# Patient Record
Sex: FEMALE | Race: WHITE | NOT HISPANIC OR LATINO | Employment: FULL TIME | URBAN - METROPOLITAN AREA
[De-identification: names, ages, dates, MRNs, and addresses within clinical notes are randomized per-mention and may not be internally consistent; named-entity substitution may affect disease eponyms.]

---

## 2024-08-20 NOTE — PATIENT INSTRUCTIONS
"\"We hope you had a positive experience and that you can definitely recommend [facility] to your family and friends. You ll be receiving a survey soon and we look forward to hearing your feedback\".    Welcome to [facility]   and thank you for choosing us for your maternity care provider!  Congratulations!      Bambuserhart  After each of your visits you are welcome to check Olapic for your visit summary including education and links to information relevant to your pregnancy and/or well woman care.   Find the \"Visits\" tab at the top of the page, you will see a list of recent visits and for each visit a for link for \"View After Visit Summary.\" View of your After Visit Summary will allow you to read our recommendations from your visit, review any education provided, and link to websites with useful information.   If you have any questions or difficulty navigating [employer], please feel free to contact us and we will do our best to direct you.  Meet the Midwives from [facility]  You are invited to an informational meet and greet with [facility] Certified Nurse-Midwives. Our free \"Meet the Midwives\" event is a great opportunity to learn about our midwives' philosophy and experience, the hospitals where we can assist with your birth, and answer questions you may have. Partners, friends, and family are welcome to attend. Currently, this is a virtual event.  Date  Last Thursday of every month at 7 pm.    Link to next (live) meeting   [email or web address]  To Join by Telephone (audio only) Call:   [phone] Phone Conference ID: [phone] #    Contact information:  Appointment line and to get a hold of CNM in clinic Monday-Friday 8 am - 5 pm:  [phone].  There are some clinics with early start times (1st appointment 7:40 am) and others with evening hours (last appointment 6:20 pm).  Most are typically open from 8 " am to 5 pm.    CNM on call answering service: [phone].  Specify your hospital of choice and leave a brief message for CNM;  will then page CNM who is on call at your specified hospital and you should receive a call back with 15 minutes.  Be sure that your ringer is audible and that you can accept blocked calls so that we can get back in touch with you! This number should be reserved for urgent needs if during the day, before 8 am, after 5 pm, weekends, holidays.      We support all families in their infant feeding journey. We'll provide education on Breastfeeding/Chestfeeding early and often to help you achieve your goals. Please let us know if you have questions along the way!      Breastfeeding: a Healthy Option for You and Your Baby  Consider breastfeeding for the healthiest way to feed your baby. Ask your midwife or physician for more information.     The choice of how you will feed your baby is important.  Before your baby s birth, you ll want to learn about the benefits of breastfeeding.  [facility] ([place] [facility]) continue to support Baby Friendly standards; an initiative that was created by the World Health Organization and UNICEF.  This helps give you and your baby the best start in feeding their baby.    Why should I breastfeed my baby?   Babies are less likely to develop childhood obesity or diabetes   Babies are less likely to suffer from recurrent ear infections   Babies are less likely to be hospitalized for respiratory conditions   Breast milk is rich in nutrients and antibodies-it is easy to digest    How does it benefit me?   Lowers the risk for diabetes, breast and ovarian cancer and postpartum depression   Moms can lose  baby weight  more quickly   Cost savings - formula can cost well over $1,500 per year   Convenient - no bottles and nipples to sterilize, no measuring and mixing formula   The physical contact with  breastfeeding can make babies feel secure, warm and comforted     What about formula?  While you and your baby are staying with us at [facility], we will support whatever feeding choice you make for your baby.    Some important considerations:    The American Academy of Pediatrics, the World Health Organization, and many more organizations recommend exclusive breastfeeding for 6 months and continued breastfeeding while adding other foods for the first 1-2 years.    Any amount of breastmilk has benefits to both baby and mother.  Giving formula in replacement of breastfeeding can affect mother s milk supply.  If formula is needed, hospital staff will work with you on a plan to help develop your milk supply.  Formula alters the natural growth of good bacteria in the  stomach.   Research has found that first time mothers who offer formula in the hospital have a shorter duration of breastfeeding.    How can I start to prepare?   Start by having a conversation with your medical provider.   Talk with your partner, family and friends.   Attend a prenatal class that includes breastfeeding preparation. Birth and breastfeeding classes are offered by [employer]. Visit TouchBistro for class information.   After your baby s birth, hospital staff and lactation consultants will help you and your baby get off to a great start with breastfeeding.      RESOURCES  [facility] Maternity Care:   https://[facility].org/locations/the-birthplace-at[facility] M[facility] Care:   https://[facility].org/locations/[facility]        Breastfeeding:    OUTPATIENT LACTATION RESOURCES     -Schedule an appointment with a [facility] KARIE who is also a Lactation Consultant by calling [phone]. We see women for breastfeeding visits at [facility] [place].      Chocolate Milk Club:  http://www.StepsssvesselsmidwiferysCabeoices.com/chocolate-milk-club/  Join the Facebook group or join us for support on the first Monday of each month from 7 to 9 p.m.  , Dr. Diann Mo, DNP, CNM, CNP, IBCLC, ICEA  Phone: [phone]  Fax: [phone]  Email: [email or web address]    R.O.S.E. = Reaching our Sisters Everywhere  Http://www.breastfeedingrose.org/    Black Women Do Breastfeed Blog  www.blackwomendobreastfeed.org    Club Mom breastfeeding support for Black mothers:  Contact Volodymyr Kelly  Phone: [phone]   Email:  [email or web address].     Marce Mcnamara  Phone: [phone]   Email:  [email or web address].    Club Dad parent support for Black fathers:   Contact Nathan Cornejo   Phone: [phone]   Email:  [email or web address].    The ong Breastfeeding Coalition is a wonderful support for [facility] women who are breastfeeding.  They are best found on Facebook.      The Hmong Breastfeeding Coalition has produced a collection of video stories about breastfeeding in the ong community, produced by the Hmong Breastfeeding Coalition.  Most are in English, but one on handling breastmilk is in Hmong.  The video collection is in the middle of the page.    This page also has several other resources on ong breastfeeding.     https://mnbreastfeedingcoalition.org/communities/    WIC program application: [place]   Https://www.youPosiGen Solar Solutionsube.com/watch?v=lQoWwPoyGYc    For information on Local WIC services call:  [phone]    You may qualify...  If you are pregnant, nursing, or have a child under age 5, we encourage you to Apply for WIC.    WIC Provides...  Nutrition tips and advice  Support for breastfeeding  Healthy foods like fresh fruits and vegetables, whole grain cereals, bread and tortillas, low fat milk, and baby foods  Caring and supportive staff  Don't delay! We're here to help!  CALL TODAY FOR A WIC  CLINIC NEAR YOU  4-892-WRT-5951 or [phone]     New Parent Connection:   [place], [address] [facility]  In-person meetings on  from 6 pm - 7:15 pm for parents of  to 9 months, at the same site.   All are free, drop-in, no registration required.    There are also free virtual meetings ongoing on :  11:30 am - 12:30 pm for parents of newborns to 3 months  4:15 pm to 5:15 pm for parents of 3 to 9-month olds  For joining info parents should call Flori Vu at [phone]    -Baby Café  Find a Baby Café - Baby Café PrepClass (babyPEARL Unlimited Holdings.Cympel)   Search by State ([place]) to find the nearest cafe to you      [facility] Baby Café  Due to COVID-19, all Baby Café sessions are canceled until further notice. For lactation support, please contact one of our bilingual staff:  Mariza (IBCLC) [phone]  Joanne (IBCLC/ Albanian) [phone]  Zojustyna (Hmong) [phone]  Albaro (Lao) [phone]  Baby Café is a free, drop-in service offering breastfeeding/chestfeeding support. Come share tips and socialize with other pregnant, breastfeeding/chestfeeding families. Babies and siblings are welcome (no  available).  We offer:  Professionally trained lactation staff.  Resource books for lending.  Relaxed and fun atmosphere.  Refreshments.   More information  Joanne Blum  [phone]  [email or web address].     -Attend a baby weigh in at [facility].  Lactation consultants are available to answer questions  [place]:  1:00 - 2:00  [facility]:  1:00 - 2:00   www.Simple AdmitFresno Heart & Surgical HospitalDuvas Technologies.Synterna Technologies    -Attend one of the New Mama groups at [facility] in [facility].  [facility] also offers one-on-one in home and in office lactation consults.   www.[facility].[employer] Technologies    -Attend a LeLeche League meeting.  Multiple groups in several locations throughout the [facility]. The meetings are no-cost and always informative breastfeeding education session  through [employer] La Leche Hayden  Www.enrrique.org/     Held at [facility] the second Thursday of each month at 7pm    Childbirth and Parenting Education:     Everyday Miracles:   https://www.everyday-miracles.org/    Free Video Series from [facility]: https://nursing.[facility]/academics/specialty-areas/nurse-midwifery/having-baby-prenatal-videos/having-baby-prenatal-and    Childbirth Education virtual (live) classes: www.Work Market/classes  The Birth Hour: https://Funinhand/online-childbirth-class/  BirthED: https://www.birthedmn.com/  GABE parenting center: http://Houseboat Resort ClubLong Island College HospitalKaliki/   (529) 284-RAGO  [place]: (education, yoga & wellness) www.AdScore  Enlightened Mama: www.enlightenedmamaWheely   Childbirth collective: (Parent topic nights)  www.childbirthcollective.org/  Hypnobabies:  www.hypnobabiestFotolia.XanEdu/  Hypnobirthing:  Http://Floobits.XanEdu/  Hypnobirthing virtual class: www.SwapBeats/hypnobirthing    Information about doulas:  Childbirth collective: http://www.childbirthcollective.org/  Doulas of [place] (BAILEY):  www.bailey.org  [facility]  project: http://twincitiesdoulaproject.com/     Early Childhood and Family Education (ECFE):  ECFE offers parents hands-on learning experiences that will nourish a lifetime of teachable moments.  http://ecfe.info/ecfe-home/    APPS and Podcasts:   Inés Cormier Nurture    Evidence Based Birth  The Birth Hour (for birth stories)   Birthful   Expectful   The Longest Shortest Time  PregnancyPodcast Marianna Cruz  https://www.downtobirthshow.com/    Book Recommendations:   Sheila Pisgah's Birthing From Within--first few chapters include a new-age tone, you may prefer to skip it and keep going, because there is good stuff later.  This book recommendation covers emotional preparation, but does cover coping with pain, and use of both pharmacological and nonpharmacological methods.    Guide to Childbirth by  "[place] May Colleen  Childbirth Without Fear by Mary Fernandez Read    Dr. Kay' The Pregnancy Book and The Birth Book--the pregnancy book goes month-by month    The Birth Partner by Rain Hernandez    Womanly Art of Breastfeeding by La Leche League International   Bestfeeding by Halina Patel--great pictures    Mothering Your Nursing Toddler, by Gemma Noel.   Addresses dealing with so many of the challenging behaviors of a nursing toddler.  How Weaning Happens, by La Leche League.  Discusses weaning at all ages, from medically necessary weaning of an infant, all the way up to age 5 (or older), with why/why not, and strategies.  Very empowering book both for deciding to wean and deciding not to.    American College of Nurse-Midwives (ACNM) http://www.midwife.org/; look at the informational handouts at http://www.midwife.org/Share-With-Women     www.mymidwife.org    Mother to Baby (Medication and Herbal guidance in pregnancy): http://www.mothertobaby.org  Toll-Free Hotline: 237.249.3691  LactMed (Medication use while breastfeeding): http://toxnet.nlm.nih.gov/newtoxnet/lactmed.htm    Women's Health.gov:  http://www.womenshealth.gov/a-z-topics/index.html    American pregnancy association - http://americanpregnancy.org    Centering Pregnancy (group prenatal care option): http://centeringhealthcare.org    March of Dimes www.Home Comfort Zones.com     FDA - Nutrition  www.mypyramid.gov  Under \"For Consumers,\" click on \"pregnant and breastfeeding women.\"      Centers for Disease Control and Prevention (CDC) - Vaccines : http://www.cdc.gov/vaccines/       When researching information on the web, question the validity of websites.  The domains .gov, .edu and.org tend to be more reliable information.  If there are a lot of advertisements, be cautious of the information provided. Stay away from blogs and chat rooms please!     "

## 2024-08-20 NOTE — PROGRESS NOTES
PRENATAL VISIT   FIRST OBSTETRICAL EXAM - OB     Assessment / Impression   First prenatal visit at Unknown  29 year old    Bipolar disorder  First Pap smear done today  Pre-pregnant BMI 30  Secondhand smoke exposure    Plan:   -Dating ultrasound ordered and accepted.  -Desires to see a psychiatry provider familiar with pregnancy.  Accepts referral to  psychiatry, referred to [facility] and [facility]i  program.  Referral for medication management as well as therapy.  -IOB labs drawn including HgbA1C.   -Pt is not a candidate for drawing lead level per [facility] screening tool.   -Waterbirth education shared in IOB packet.   -Reviewed prenatal care schedule.   -Optimal nutrition and weight gain discussed. Pregnancy weight gain of 11-20 lbs (BMI 30.0-34.9) encouraged.   -Anticipatory guidance for common pregnancy questions and concerns reviewed.   -Danger s/sx for this trimester reviewed with patient.   -Reviewed genetic screening options with patient.  She will review the information on nips and call insurance if desired.  Aware she can have done at next visit or sooner as desired.  -IOB packet given and reviewed with patient.   -CNM services and hospital options reviewed; emergency and scheduling phone numbers given to patient.   -Because the patient does have 1 high risk factor, low-dose aspirin will be initiated at 12 weeks.  -Antepartum VTE risk factors absent.  -Pt is not a candidate for an antepartum OB consult.    -Return to clinic in 4 weeks or sooner as needed.    Total time: 65 minutes spent on the date of the encounter doing chart review, review of test results, patient visit and documentation.     Subjective:   Manuela Gruber is a 29 year old  here today for her first obstetrical exam at Unknown. Here with Junito . This pregnancy is not planned.  She had her Nexplanon out in April.  Her cycles were irregular and they were considering a pregnancy in the future but were not actively trying.   They are excited and accepting of the pregnancy. The patient reports nausea, vomiting, fatigue, and breast tenderness.  She is unsure of her last menstrual period.  She was getting cycles approximately every 2 to 4 weeks.    The patient states that she is in a monogamous relationship and states that she is safe. Offered GC/CT screening today and patient declines. Additional pregnancy symptoms include: breast tenderness, fatigue, frequent urination, and nausea.     Risk factors: pre-pregnancy obesity. Pregnancy Risk Factors:Significantly overweight or underweight and Every been treated for an emotional disturbance    The patient has the following high risk factors for preeclampsia: none.   The patient has the following moderate risk factors for preeclampsia: first pregnancy    The patient has the following antepartum risk factors for VTE (two or more risk factors, or 1 * risk factor, places patient at higher risk): none.    Clinical history/risk factors requiring antepartum OB consult: none.    Social History:   Education level: Some college  Occupation:  with [employer], works from home  Partners name: Junito  ?   OB History    Para Term  AB Living   1 0 0 0 0 0   SAB IAB Ectopic Multiple Live Births   0 0 0 0 0      # Outcome Date GA Lbr Brody/2nd Weight Sex Type Anes PTL Lv   1 Current                 History:   Past Medical History:   Diagnosis Date    Bipolar disorder (H)       No past surgical history on file.   Family History   Problem Relation Age of Onset    Mental Illness Mother     No Known Problems Father     Mental Illness Maternal Grandmother     No Known Problems Maternal Grandfather     No Known Problems Paternal Grandmother     No Known Problems Paternal Grandfather     Heart Defect Paternal Uncle     Mental Illness Maternal Aunt     No Known Problems Maternal Uncle       Social History     Tobacco Use    Smoking status: Never     Passive exposure: Current    Smokeless tobacco:  Never   Substance Use Topics    Alcohol use: Not Currently    Drug use: Never      Current Outpatient Medications   Medication Sig Dispense Refill    ARIPiprazole (ABILIFY) 5 MG tablet       Prenat MV-Min w/Fe-Folate-DHA (PRENATAL COMPLETE PO) Take by mouth.      busPIRone (BUSPAR) 5 MG tablet  (Patient not taking: Reported on 8/26/2024)        No Known Allergies     The patient's medical, surgical and family histories were reviewed and were pertinent to this visit.  Was diagnosed with bipolar a few years ago. Sees psychiatrist at [facility], Dr. Rocha.  She states he told her medications were not safe to take in pregnancy and it may be best to go off all medications.  She stopped the BuSpar which she took to help manage anxiety but continues on the Abilify.  She denies any safety concerns, suicidal thoughts or plans.  She desires to see a pregnancy specific psychiatrist.    Pap smear: Last Pap: Never. Next Due: Today.     Phq2 (   1999 Pfizer Inc,All Rights Reserved. Used With Permission. Developed By Candy Craft,Nick Butterfield And Colleagues,With An Educational Shasta From Pfizer Inc.)    8/26/2024 10:58 AM CDT - Filed by Patient   The following questionnaire should only be answered by the patient. Are you the patient? Yes   Over the last 2 weeks, how often have you been bothered by any of the following problems?    Q1: Little interest or pleasure in doing things Not at all   Q2: Feeling down, depressed or hopeless Not at all   PHQ2 (   1999 PFIZER INC,ALL RIGHTS RESERVED. USED WITH PERMISSION. DEVELOPED BY CANDY CRAFT,NICK BUTTERFIELD AND COLLEAGUES,WITH AN EDUCATIONAL SHASTA FROM [employer]arm[employer]here.)    PHQ-2 Score (range: 0 - 6) 0     [place] Pp Depression Scale    8/26/2024 10:59 AM CDT - Filed by Patient   I have been able to laugh and see the funny side of things. As much as I always could   I have looked forward with enjoyment to things. As much as I ever  did   I have blamed myself unnecessarily when things went wrong. Yes, most of the time   I have been anxious or worried for no good reason. Yes, sometimes   I have felt scared or panicky for no good reason. No, not at all   Things have been getting on top of me. Yes, sometimes I haven't been coping as well as usual   I have been so unhappy that I have had difficulty sleeping. Not very often   I have felt sad or miserable. Not very often   I have been so unhappy that I have been crying. No, never   The thought of harming myself has occurred to me. Never     Myc Communicable Disease Screening    8/21/2024  4:27 PM CDT - Filed by Patient   Do you have any of the following new or worsening symptoms ? None of these   Have you had close contact with someone who has traveled outside the country in the past 30 days and is sick? No     Prenatal Ob Questionnaire    8/21/2024  4:28 PM CDT - Filed by Patient   Past Medical History 2    Have you ever received a blood transfusion? Unknown   Would you accept a blood transfusion if was medically recommended? Yes    Is your blood type Rh negative? Unknown   Have you been hospitalized for a nonsurgical reason excluding normal delivery? Unknown   Do you have a history of abnormalities of the uterus? Unknown   Have you ever had an abnormal pap smear? No   Did your mother take RAYNE or any other hormones when she was pregnant with you? Unknown   Have you ever ? No   Do you live with someone who has tuberculosis? No   Have you ever been exposed to tuberculosis? No   Have you had any other infectious diseases? No   Have you had a viral illness since your last period? Unknown   Does anyone in your home smoke?  Yes !    Have you ever recieved care for your mental health?   Yes !    Have you ever been in a major accident or suffered serious trauma? No   Within the last year, has anyone hit, slapped, kicked or otherwise hurt you? No   In the last year, has anyone forced you to have sex  "when you didn't want to? No   Do you have any other problems we have not asked about which you feel may be important to this pregnancy? No     [facility] Reg Add'L Questions    8/21/2024  4:35 PM CDT - Filed by Patient         EPDS score today: 9.\"0\" to #10     Review of Systems   General: Fatigue but otherwise denies problem   Eyes: Denies problem   Ears/Nose/Throat: Denies problem   Cardiovascular: Denies problem   Respiratory: Denies problem   Gastrointestinal: Nausea without vomiting, otherwise negative   Genitourinary: Denies any discharge, vaginal bleeding or itchiness or any other problem   Musculoskeletal: Breast tenderness otherwise denies problem   Skin: Denies problem   Neurologic: Denies problem   Psychiatric: Denies problem   Endocrine: Denies problem   Heme/Lymphatic: Denies problem   Allergic/Immunologic: Denies problem           Objective:   Objective    Vitals:    08/26/24 1115   BP: 104/68   Pulse: 78   SpO2: 96%   Weight: 64 kg (141 lb)   Height: 1.473 m (4' 10\")        Physical Exam:   General Appearance: Alert, cooperative, no distress, appears stated age   HEENT: Normocephalic, without obvious abnormality, atraumatic. Conjunctiva/corneas clear.  Neck: Supple, symmetrical, trachea midline, no adenopathy.   Thyroid: not enlarged, symmetric, no tenderness/mass/nodules   Back: Symmetric, no curvature, ROM normal, no CVA tenderness   Lungs: Clear to auscultation bilaterally, respirations unlabored   Heart: Regular rate and rhythm, S1 and S2 normal, no murmur, rub, or gallop. No edema to lower extremities.   Breasts: No breast masses, tenderness, asymmetry, or nipple discharge. Nipples are everted.   Abdomen: Gravid, soft, non-tender, bowel sounds active all four quadrants, no masses.   FHT: 150  Vulva:  no sign of lesions or condyloma, normal hair distribution   Vagina: pink, normal rugae, no abnormal discharge   Cervix:  long/thick/closed, no lesions or inflammation " noted  Musculoskeletal: Extremities normal, atraumatic, no cyanosis   Skin: Skin color, texture, turgor normal, no rashes or lesions   Lymph nodes: Cervical, supraclavicular, and axillary nodes normal   Neurologic: Alert and oriented x 3. Normal speech

## 2024-08-22 ENCOUNTER — NURSE TRIAGE (OUTPATIENT)
Dept: NURSING | Facility: CLINIC | Age: 29
End: 2024-08-22
Payer: COMMERCIAL

## 2024-08-23 NOTE — TELEPHONE ENCOUNTER
"Pt reports oral temperature \"just now\" 99.9. Pt reports since yesterday \"sniffles, sneezing, feeling under weather today, runny nose, muscle aches, clogged stuffy feeling sinus'\". Pt reports she has been having morning sickness, nausea \"all day thing\". Pt reports morning sickness managable today, \"haven't thrown up\", drank some water, had soup. Pt reports she does think her fluid intake is less due to morning sickness. Pt reports she is unable to take Tylenol because it has caused her stomach upset in the past.     Writer advised pt on home care per Care Advice especially sinus rinses and increased fluids. Advised pt to call back if fever 100.4 occurs, especially if worsening symptoms. Call back if home care not helpful.     Pt verbalizes understanding and agrees to plan.       Reason for Disposition   [1] Sinus congestion as part of a cold AND [2] present < 10 days    Additional Information   Negative: SEVERE difficulty breathing (e.g., struggling for each breath, speaks in single words)   Negative: Sounds like a life-threatening emergency to the triager   Negative: [1] Sinus infection AND [2] taking an antibiotic AND [3] symptoms continue   Negative: [1] Difficulty breathing AND [2] not from stuffy nose (e.g., not relieved by cleaning out the nose)   Negative: [1] SEVERE headache AND [2] fever   Negative: [1] Redness or swelling on the cheek, forehead or around the eye AND [2] fever   Negative: Fever > 104 F (40 C)   Negative: Patient sounds very sick or weak to the triager   Negative: [1] SEVERE pain AND [2] not improved 2 hours after pain medicine   Negative: [1] Redness or swelling on the cheek, forehead or around the eye AND [2] no fever   Negative: [1] Fever > 101 F (38.3 C) AND [2] age > 60 years   Negative: [1] Fever > 100.0 F (37.8 C) AND [2] bedridden (e.g., CVA, chronic illness, recovering from surgery)   Negative: [1] Fever > 100.0 F (37.8 C) AND [2] diabetes mellitus or weak immune system (e.g., HIV " positive, cancer chemo, splenectomy, organ transplant, chronic steroids)   Negative: Fever present > 3 days (72 hours)   Negative: [1] Fever returns after gone for over 24 hours AND [2] symptoms worse or not improved   Negative: [1] Sinus pain (not just congestion) AND [2] fever   Negative: Earache   Negative: [1] Sinus congestion (pressure, fullness) AND [2] present > 10 days   Negative: [1] Nasal discharge AND [2] present > 10 days   Negative: [1] Using nasal washes and pain medicine > 24 hours AND [2] sinus pain (around cheekbone or eye) persists   Negative: Lots of coughing    Protocols used: Sinus Pain or Congestion-A-AH

## 2024-08-26 ENCOUNTER — MYC MEDICAL ADVICE (OUTPATIENT)
Dept: MIDWIFE SERVICES | Facility: CLINIC | Age: 29
End: 2024-08-26

## 2024-08-26 ENCOUNTER — HOSPITAL ENCOUNTER (OUTPATIENT)
Dept: ULTRASOUND IMAGING | Facility: HOSPITAL | Age: 29
Discharge: HOME | End: 2024-08-26
Attending: ADVANCED PRACTICE MIDWIFE | Admitting: ADVANCED PRACTICE MIDWIFE
Payer: COMMERCIAL

## 2024-08-26 ENCOUNTER — PRENATAL OFFICE VISIT (OUTPATIENT)
Dept: MIDWIFE SERVICES | Facility: CLINIC | Age: 29
End: 2024-08-26
Payer: COMMERCIAL

## 2024-08-26 VITALS
HEIGHT: 58 IN | DIASTOLIC BLOOD PRESSURE: 68 MMHG | SYSTOLIC BLOOD PRESSURE: 104 MMHG | WEIGHT: 141 LBS | BODY MASS INDEX: 29.6 KG/M2 | OXYGEN SATURATION: 96 % | HEART RATE: 78 BPM

## 2024-08-26 DIAGNOSIS — Z34.00 SUPERVISION OF NORMAL FIRST PREGNANCY, ANTEPARTUM: Primary | ICD-10-CM

## 2024-08-26 DIAGNOSIS — F31.63 BIPOLAR DISORDER, CURRENT EPISODE MIXED, SEVERE, UNSPECIFIED WHETHER PSYCHOTIC FEATURES (H): ICD-10-CM

## 2024-08-26 DIAGNOSIS — Z34.00 SUPERVISION OF NORMAL FIRST PREGNANCY, ANTEPARTUM: ICD-10-CM

## 2024-08-26 DIAGNOSIS — F31.70 BIPOLAR DISORDER IN PARTIAL REMISSION, MOST RECENT EPISODE UNSPECIFIED TYPE (H): ICD-10-CM

## 2024-08-26 PROBLEM — F31.9 BIPOLAR AFFECTIVE DISORDER, CURRENTLY ACTIVE (H): Status: ACTIVE | Noted: 2024-08-26

## 2024-08-26 LAB
ABO/RH(D): NORMAL
ANTIBODY SCREEN: NEGATIVE
ERYTHROCYTE [DISTWIDTH] IN BLOOD BY AUTOMATED COUNT: 12.4 % (ref 10–15)
HBV SURFACE AG SERPL QL IA: NONREACTIVE
HCT VFR BLD AUTO: 34.4 % (ref 35–47)
HCV AB SERPL QL IA: NONREACTIVE
HGB BLD-MCNC: 12.1 G/DL (ref 11.7–15.7)
HIV 1+2 AB+HIV1 P24 AG SERPL QL IA: NONREACTIVE
MCH RBC QN AUTO: 31.6 PG (ref 26.5–33)
MCHC RBC AUTO-ENTMCNC: 35.2 G/DL (ref 31.5–36.5)
MCV RBC AUTO: 90 FL (ref 78–100)
PLATELET # BLD AUTO: 183 10E3/UL (ref 150–450)
RBC # BLD AUTO: 3.83 10E6/UL (ref 3.8–5.2)
RUBV IGG SERPL QL IA: 2.43 INDEX
RUBV IGG SERPL QL IA: POSITIVE
SPECIMEN EXPIRATION DATE: NORMAL
SPECIMEN EXPIRATION DATE: NORMAL
T PALLIDUM AB SER QL: NONREACTIVE
WBC # BLD AUTO: 7.5 10E3/UL (ref 4–11)

## 2024-08-26 PROCEDURE — 87389 HIV-1 AG W/HIV-1&-2 AB AG IA: CPT | Performed by: ADVANCED PRACTICE MIDWIFE

## 2024-08-26 PROCEDURE — G0145 SCR C/V CYTO,THINLAYER,RESCR: HCPCS | Performed by: ADVANCED PRACTICE MIDWIFE

## 2024-08-26 PROCEDURE — 76801 OB US < 14 WKS SINGLE FETUS: CPT

## 2024-08-26 PROCEDURE — 86803 HEPATITIS C AB TEST: CPT | Performed by: ADVANCED PRACTICE MIDWIFE

## 2024-08-26 PROCEDURE — 86762 RUBELLA ANTIBODY: CPT | Performed by: ADVANCED PRACTICE MIDWIFE

## 2024-08-26 PROCEDURE — 86900 BLOOD TYPING SEROLOGIC ABO: CPT | Performed by: ADVANCED PRACTICE MIDWIFE

## 2024-08-26 PROCEDURE — 87340 HEPATITIS B SURFACE AG IA: CPT | Performed by: ADVANCED PRACTICE MIDWIFE

## 2024-08-26 PROCEDURE — 85027 COMPLETE CBC AUTOMATED: CPT | Performed by: ADVANCED PRACTICE MIDWIFE

## 2024-08-26 PROCEDURE — 36415 COLL VENOUS BLD VENIPUNCTURE: CPT | Performed by: ADVANCED PRACTICE MIDWIFE

## 2024-08-26 PROCEDURE — 86850 RBC ANTIBODY SCREEN: CPT | Performed by: ADVANCED PRACTICE MIDWIFE

## 2024-08-26 PROCEDURE — 87086 URINE CULTURE/COLONY COUNT: CPT | Performed by: ADVANCED PRACTICE MIDWIFE

## 2024-08-26 PROCEDURE — 99205 OFFICE O/P NEW HI 60 MIN: CPT | Performed by: ADVANCED PRACTICE MIDWIFE

## 2024-08-26 PROCEDURE — 99459 PELVIC EXAMINATION: CPT | Performed by: ADVANCED PRACTICE MIDWIFE

## 2024-08-26 PROCEDURE — 86780 TREPONEMA PALLIDUM: CPT | Performed by: ADVANCED PRACTICE MIDWIFE

## 2024-08-26 PROCEDURE — 86901 BLOOD TYPING SEROLOGIC RH(D): CPT | Performed by: ADVANCED PRACTICE MIDWIFE

## 2024-08-26 RX ORDER — ARIPIPRAZOLE 5 MG/1
TABLET ORAL
COMMUNITY

## 2024-08-26 RX ORDER — BUSPIRONE HYDROCHLORIDE 5 MG/1
TABLET ORAL
COMMUNITY

## 2024-08-26 ASSESSMENT — EDINBURGH POSTNATAL DEPRESSION SCALE (EPDS)
I HAVE BLAMED MYSELF UNNECESSARILY WHEN THINGS WENT WRONG: YES, MOST OF THE TIME
THINGS HAVE BEEN GETTING ON TOP OF ME: YES, SOMETIMES I HAVEN'T BEEN COPING AS WELL AS USUAL
I HAVE BEEN ABLE TO LAUGH AND SEE THE FUNNY SIDE OF THINGS: AS MUCH AS I ALWAYS COULD
I HAVE BEEN ABLE TO LAUGH AND SEE THE FUNNY SIDE OF THINGS: AS MUCH AS I ALWAYS COULD
I HAVE FELT SCARED OR PANICKY FOR NO GOOD REASON: NO, NOT AT ALL
I HAVE FELT SAD OR MISERABLE: NOT VERY OFTEN
I HAVE FELT SCARED OR PANICKY FOR NO GOOD REASON: NO, NOT AT ALL
THINGS HAVE BEEN GETTING ON TOP OF ME: YES, SOMETIMES I HAVEN'T BEEN COPING AS WELL AS USUAL
I HAVE BEEN SO UNHAPPY THAT I HAVE HAD DIFFICULTY SLEEPING: NOT VERY OFTEN
I HAVE FELT SAD OR MISERABLE: NOT VERY OFTEN
I HAVE BEEN ANXIOUS OR WORRIED FOR NO GOOD REASON: YES, SOMETIMES
THE THOUGHT OF HARMING MYSELF HAS OCCURRED TO ME: NEVER
I HAVE BEEN SO UNHAPPY THAT I HAVE BEEN CRYING: NO, NEVER
THE THOUGHT OF HARMING MYSELF HAS OCCURRED TO ME: NEVER
I HAVE BEEN SO UNHAPPY THAT I HAVE HAD DIFFICULTY SLEEPING: NOT VERY OFTEN
TOTAL SCORE: 9
I HAVE LOOKED FORWARD WITH ENJOYMENT TO THINGS: AS MUCH AS I EVER DID
I HAVE LOOKED FORWARD WITH ENJOYMENT TO THINGS: AS MUCH AS I EVER DID
I HAVE BLAMED MYSELF UNNECESSARILY WHEN THINGS WENT WRONG: YES, MOST OF THE TIME
I HAVE BEEN SO UNHAPPY THAT I HAVE BEEN CRYING: NO, NEVER
I HAVE BEEN ANXIOUS OR WORRIED FOR NO GOOD REASON: YES, SOMETIMES

## 2024-08-27 LAB — BACTERIA UR CULT: NORMAL

## 2024-08-29 LAB
BKR LAB AP GYN ADEQUACY: NORMAL
BKR LAB AP GYN INTERPRETATION: NORMAL
BKR LAB AP HPV REFLEX: NORMAL
BKR LAB AP PREVIOUS ABNORMAL: NORMAL
PATH REPORT.COMMENTS IMP SPEC: NORMAL
PATH REPORT.COMMENTS IMP SPEC: NORMAL
PATH REPORT.RELEVANT HX SPEC: NORMAL

## 2024-09-28 NOTE — PROGRESS NOTES
Manuela is here alone for a routine prenatal visit at 16w3d. Reviewed IOB labs and dating ultrasound.   Disc option for genetic screening at this time including NIPS, quad screen, single marker AFP ([id number]). Pt ***.   Pregnancy discomforts include ***.   Sleeping ***.   Fetal movements ***.   Anatomy scan discussed and ordered at ***.   Early second trimester pregnancy anticipatory guidance reviewed.   Enc follow-up in 4 weeks or sooner prn.

## 2024-09-30 ENCOUNTER — PRENATAL OFFICE VISIT (OUTPATIENT)
Dept: MIDWIFE SERVICES | Facility: CLINIC | Age: 29
End: 2024-09-30
Payer: COMMERCIAL

## 2024-09-30 VITALS
BODY MASS INDEX: 29.26 KG/M2 | WEIGHT: 140 LBS | SYSTOLIC BLOOD PRESSURE: 108 MMHG | DIASTOLIC BLOOD PRESSURE: 66 MMHG | OXYGEN SATURATION: 99 % | HEART RATE: 81 BPM

## 2024-09-30 DIAGNOSIS — Z34.00 SUPERVISION OF NORMAL FIRST PREGNANCY, ANTEPARTUM: Primary | ICD-10-CM

## 2024-09-30 DIAGNOSIS — Z91.89 AT RISK FOR COMPLICATION OF PREGNANCY: ICD-10-CM

## 2024-09-30 PROCEDURE — 99207 PR PRENATAL VISIT: CPT | Performed by: ADVANCED PRACTICE MIDWIFE

## 2024-09-30 PROCEDURE — 99000 SPECIMEN HANDLING OFFICE-LAB: CPT | Performed by: ADVANCED PRACTICE MIDWIFE

## 2024-09-30 PROCEDURE — 36415 COLL VENOUS BLD VENIPUNCTURE: CPT | Performed by: ADVANCED PRACTICE MIDWIFE

## 2024-09-30 PROCEDURE — 82105 ALPHA-FETOPROTEIN SERUM: CPT | Mod: 90 | Performed by: ADVANCED PRACTICE MIDWIFE

## 2024-09-30 RX ORDER — ASPIRIN 81 MG/1
81 TABLET ORAL DAILY
Qty: 90 TABLET | Refills: 1 | Status: SHIPPED | OUTPATIENT
Start: 2024-09-30 | End: 2025-03-29

## 2024-09-30 NOTE — PROGRESS NOTES
Manuela is here with Junito for a routine prenatal visit at 16w3d. Reviewed IOB labs and dating ultrasound. Disc option for genetic screening at this time including NIPS, quad screen, single marker AFP ([id number]). Pt accepts AFP today. Still unsure insurance coverage for NIPS so declines today. Pregnancy discomforts include some continued nausea and fatigue though both are lifting. Sleeping fairly well.  Has not yet scheduled with [facility] due to insurance issues but feels emotionally and mentally stable on her current dosage of Abilify.  Agrees to call [facility] to set up an intake visit.  No quickening yet.  Anatomy scan discussed and ordered at [facility].   Early second trimester pregnancy anticipatory guidance reviewed.   Enc follow-up in 4 weeks or sooner prn.   Flu shot recommended, patient declines today.

## 2024-09-30 NOTE — PATIENT INSTRUCTIONS
"\"We hope you had a positive experience and that you can definitely recommend [facility] to your family and friends. You ll be receiving a survey soon and we look forward to hearing your feedback\".    [facility]- Contact information:  Appointment line and to get a hold of CNM in clinic Monday-Friday 8 am - 5 pm:  [phone].  There are some clinics with early start times (1st appointment 7:40 am) and others with evening hours (last appointment 6:20 pm).  Most are typically open from 8 am to 5 pm.    CNM on call answering service: [phone].  Specify your hospital of choice and leave a brief message for CNM;  will then page CNM who is on call at your specified hospital and you should receive a call back with 15 minutes.  Be sure that your ringer is audible and that you can accept blocked calls so that we can get back in touch with you! This number should be reserved for urgent needs if during the day, before 8 am, after 5 pm, weekends, holidays.    Contact the on-call CNM with warning signs, such as:  vaginal bleeding   Vaginal discharge and itching or pain and burning during urination  Leg/calf pain or swelling on one side  severe abdominal pain  nausea and vomiting more than 4-5 times a day, or if you are unable to keep anything down  fever more than 100.4 degrees F.     Lyfepointshart  After each of your visits you are welcome to check LiveBid for your visit summary including education and links to information relevant to your pregnancy and/or well woman care.   Find the \"Visits\" tab at the top of the page, you will see a list of recent visits and for each visit a for link for \"View After Visit Summary.\" View of your After Visit Summary will allow you to read our recommendations from your visit, review any education provided, and link to websites with useful information.   If you have any questions or difficulty navigating Boomr, please feel free to " "contact us and we will do our best to direct you.        Touring the Maternity Care Center  At this time we are offering a virtual tour of the Maternity Care Centers at both [facility] and [facility]:   [facility] Maternity Care:     [email or web address].[email or web address]/locations/the-birthplace-at--[facility] Maternity Care:   [email or web address]--[facility]       Meet the Midwives from [facility]  You are invited to an informational meet and greet with [facility]'s [facility] Certified Nurse-Midwives. Our free \"Meet the Midwives\" event is a great opportunity to learn about our midwives' philosophy and experience, the hospitals where we can assist with your birth, and answer questions you may have. Partners, friends, and family are welcome to attend. Currently, this is a virtual event.  Date  Last Thursday of every month at 7 pm.    Link to next (live) meeting  [email or web address]  To Join by Telephone (audio only) Call:   [phone] Phone Conference ID: [phone] #    Ultrasound Appointment:   Don't forget to schedule your ultrasound appointment around 20 weeks into your pregnancy. Your midwife will order the exam for you to schedule at [phone] with [facility] radiology locations or at the independent radiology clinic of your preference.      Why is dental care in pregnancy important?  During pregnancy, you are more likely to have problems with your teeth or gums. If you have an infection in your teeth or gums, the chance of your baby being premature (born early) or having low birth weight may be slightly higher than if your teeth and gums are healthy  Dental care is safe during pregnancy and important for the health of you and your baby.   What should you know before you see the dentist?  Make sure your dentist knows that you are " pregnant.  If medications for infection or for pain are needed, your dentist can prescribe ones that are safe for you and your baby.  Tell your dentist about any changes you have noticed since you became pregnant and about any medications r or supplements you are taking.  Routine x-rays should be avoided in pregnancy, but it may be necessary if there is a problem or an emergency.   Your body should be covered with a lead apron to protect you and your baby.  Dental work can be done safely at any point in pregnancy. If possible, it is best to delay treatments and pro- cedures until after the first trimester.    For more information on dental health in pregnancy: http://onlinelibrary.de la vega.com/store/10.1111/jmwh.56849/asset/hbwi68657.pdf?v=1&t=ufzp5s37&o=[email or web address]     Quickening:   Your Baby in the Second Trimester of Pregnancy  At the start of the second trimester, you will feel your baby's movements, which get stronger as the baby grows bigger. At the end of the fourth month, your baby weighs about five ounces. Her kidneys begin to produce urine. During visits to your health care provider, you will be able to hear your baby's heartbeat more clearly. Your baby can move and hear your voice.   By the end of the fifth month, you'll be able to feel light movements (called quickening) of your fetus. Your baby is sleeping and waking at regular intervals, and is more active than before. At this point, she is about nine inches long and weighs about one-half to one pound. During the sixth month, your baby's features become clearer. Eyebrows, eyelashes, and hair are developing. She also has finger and toe prints, and may be kicking strongly.  By the end of the second trimester, your baby weighs as much as two pounds and is about 11 inches long.         Gestational diabetes  Gestational diabetes develops during pregnancy (gestation). Like other types of diabetes, gestational diabetes affects how  your cells use sugar (glucose). Gestational diabetes causes high blood sugar that can affect your pregnancy and your baby's health.  Any pregnancy complication is concerning, but there's good news. Expectant moms can help control gestational diabetes by eating healthy foods, exercising and, if necessary, taking medication. Controlling blood sugar can prevent a difficult birth and keep you and your baby healthy.  In gestational diabetes, blood sugar usually returns to normal soon after delivery. But if you've had gestational diabetes, you're at risk for type 2 diabetes. You'll continue working with your health care team to monitor and manage your blood sugar.    Who is at risk?  This is a list of factors that increase the risk of developing gestational diabetes for women during pregnancy:      Overweight prior to pregnancy (20% or more over ideal body weight)      High risk ethnic group: , , ,       Impaired glucose tolerance or traces of glucose in the urine      Family history of diabetes      Previously giving birth to a baby over 9 lbs. or stillborn      Previous pregnancy with gestational diabetes    Prevention:  There are no guarantees when it comes to preventing gestational diabetes -- but the more healthy habits you can adopt before pregnancy, the better. If you've had gestational diabetes, these healthy choices may also reduce your risk of having it in future pregnancies or developing type 2 diabetes down the road.  Eat healthy foods. Choose foods high in fiber and low in fat and calories. Focus on fruits, vegetables and whole grains. Strive for variety to help you achieve your goals without compromising taste or nutrition. Watch portion sizes.   Keep active. Exercising before and during pregnancy can help protect you from developing gestational diabetes. Aim for 30 minutes of moderate activity on most days of the week. Take a brisk daily walk. Ride your bike. Swim  laps.  If you can't fit a single 30-minute workout into your day, several shorter sessions can do just as much good. Park in the distant lot when you run errands. Get off the bus one stop before you reach your destination. Every step you take increases your chances of staying healthy.  Lose excess pounds before pregnancy. Doctors don't recommend weight loss during pregnancy. But if you're planning to get pregnant, losing extra weight beforehand may help you have a healthier pregnancy.  Focus on permanent changes to your eating habits. Motivate yourself by remembering the long-term benefits of losing weight, such as a healthier heart, more energy and improved self-esteem.    Preventing Diabetes after Pregnancy:  It is estimated that 35-60 percent of women that have had gestational diabetes will develop type 2 diabetes in the future. It is also thought that children from these pregnancies have a greater chance of developing obesity and type 2 diabetes.    If you do have prediabetes or have risk factors for having diabetes, research shows that doing just two things can help you prevent or delay type 2 diabetes: Lose 5% to 7% of your body weight, which would be 10 to 14 pounds for a 200-pound person; and get at least 150 minutes each week of physical activity, such as brisk walking.        RESOURCES    Breastfeeding Information:  OUTPATIENT LACTATION RESOURCES    -Schedule a clinic appointment with a [facility] with dedicated clinic hours for breastfeeding assistance by calling [phone]. Breastfeeding clinic visits are at [facility] on , [facility] on  and [facility] on .     New Parent Connection:   [facility] [employer], [address] [place] [address]  In-person meetings on  from 6 pm - 7:15 pm for parents of  to 9 months, at the same site.   All are free, drop-in, no registration required.    There are also free virtual  meetings ongoing on Tuesdays:  11:30 am - 12:30 pm for parents of newborns to 3 months  4:15 pm to 5:15 pm for parents of 3 to 9-month olds  For joining info parents should call Flori Horace at [phone]    -Attend a baby weigh in at [facility].  Lactation consultants are available to answer questions  [place]: Tuesdays 1:00 - 2:00  [facility]: Mondays 1:00 - 2:00   www.Cemmerce    -Attend one of the New Mama groups at [facility] in [facility]  [facility] also offers one-on-one in home and in office lactation consults.   www.[employer]    -Attend a LeLeche League meeting.  Multiple groups in several locations throughout the [facility]. The meetings are no-cost and always informative breastfeeding education session through [employer] La Leche League  Www.lllondas.org/    -Medication use while breastfeeding: http://toxnet.nlm.nih.gov/newtoxnet/lactmed.htm     Childbirth and Parenting Education:     Everyday Miracles:   https://www.everyday-miracles.org/    Free Video Series from [facility]: https://nursing.[facility]/academics/specialty-areas/nurse-midwifery/having-baby-prenatal-videos/having-baby-prenatal-and    Childbirth Education virtual (live) classes: www.RSens/classes  The Birth Hour: https://[employer]/online-childbirth-class/  BirthED: https://www.birthedmn.com/  [place] parenting center: http://Cemmerce/   ([phone]) 835-BABY  [place]: (education, yoga & wellness) www.FlyCleaners.Tickade  [facility]: www.[employer]   Childbirth collective: (Parent topic nights)  www.childbirthcollective.org/  Hypnobabies:  www.hypnobabiestOR ProductivityciPhenex Pharmaceuticals.com/  Hypnobirthing:  Http://hypnSteel Steed StudiortWear My Tags.Tickade/  Hypnobirthing virtual class: www.LEAFER/hypnobirthing    Information about doulas:  Childbirth collective: http://www.childbirthcollective.org/  Doulas of [place] (BAILEY):  www.bailey.org  [facility]  project: http://viaForensicscitiesdoulaproject.com/     Early Childhood and Family Education (ECFE):  ECFE offers parents hands-on learning experiences that will nourish a lifetime of teachable moments.  http://ecfe.info/ecfe-home/    APPS and Podcasts:   Monikaia  Genia Lancasterture    Evidence Based Birth  The Birth Hour (for birth stories)   Birthful   Expectful   The Longest Shortest Time  PregnancyPodcast Marianna Cruz  https://www.downtobirthshow.com/    Book Recommendations:   Sheila Roxana's Birthing From Within--first few chapters include a new-age tone, you may prefer to skip it and keep going, because there is good stuff later.  This book recommendation covers emotional preparation, but does cover coping with pain, and use of both pharmacological and nonpharmacological methods.    Guide to Childbirth by Radha Macias  Childbirth Without Fear by Mary Fernandez Read    Dr. Kay' The Pregnancy Book and The Birth Book--the pregnancy book goes month-by month    The Birth Partner by Rain Hernandez    Womanly Art of Breastfeeding by La Leche League International   Bestfeeding by Halina Patel--great pictures    Mothering Your Nursing Toddler, by Gemma Noel.   Addresses dealing with so many of the challenging behaviors of a nursing toddler.  How Weaning Happens, by La Leche League.  Discusses weaning at all ages, from medically necessary weaning of an infant, all the way up to age 5 (or older), with why/why not, and strategies.  Very empowering book both for deciding to wean and deciding not to.    American College of Nurse-Midwives (ACNM) http://www.midwife.org/; look at the informational handouts at http://www.midwife.org/Share-With-Women     www.mymidwife.org    Mother to Baby (Medication and Herbal guidance in pregnancy): http://www.mothertobaby.org  Toll-Free Hotline: 355.200.3100  LactMed (Medication use while breastfeeding): http://toxnet.nlm.nih.gov/newtoxnet/lactmed.htm    Women's  "Health.gov:  http://www.womenshealth.gov/a-z-topics/index.html    American pregnancy association - http://americanpregnancy.org    Centering Pregnancy (group prenatal care option): http://centeringhealthcare.org    March of Dimes www.Traddr.com     FDA - Nutrition  www.mypyramid.gov  Under \"For Consumers,\" click on \"pregnant and breastfeeding women.\"      Centers for Disease Control and Prevention (CDC) - Vaccines : http://www.cdc.gov/vaccines/       When researching information on the web, question the validity of websites.  The ConteXtream .gov, .edu and.org tend to be more reliable information.  If there are a lot of advertisements, be cautious of the information provided. Stay away from blogs and chat rooms please!     "

## 2024-10-03 LAB
# FETUSES US: NORMAL
AFP MOM SERPL: 1.6
AFP SERPL-MCNC: 61 NG/ML
AGE - REPORTED: 29.9 YR
CURRENT SMOKER: NO
FAMILY MEMBER DISEASES HX: NO
GA METHOD: NORMAL
GA: NORMAL WK
IDDM PATIENT QL: NO
INTEGRATED SCN PATIENT-IMP: NORMAL
SPECIMEN DRAWN SERPL: NORMAL

## 2024-10-06 ENCOUNTER — HEALTH MAINTENANCE LETTER (OUTPATIENT)
Age: 29
End: 2024-10-06

## 2024-10-29 ENCOUNTER — PRENATAL OFFICE VISIT (OUTPATIENT)
Dept: MIDWIFE SERVICES | Facility: CLINIC | Age: 29
End: 2024-10-29
Payer: COMMERCIAL

## 2024-10-29 ENCOUNTER — HOSPITAL ENCOUNTER (OUTPATIENT)
Dept: ULTRASOUND IMAGING | Facility: HOSPITAL | Age: 29
Discharge: HOME | End: 2024-10-29
Attending: ADVANCED PRACTICE MIDWIFE | Admitting: ADVANCED PRACTICE MIDWIFE
Payer: COMMERCIAL

## 2024-10-29 VITALS
DIASTOLIC BLOOD PRESSURE: 64 MMHG | WEIGHT: 147 LBS | OXYGEN SATURATION: 99 % | BODY MASS INDEX: 30.72 KG/M2 | HEART RATE: 90 BPM | SYSTOLIC BLOOD PRESSURE: 106 MMHG

## 2024-10-29 DIAGNOSIS — F31.63 BIPOLAR DISORDER, CURRENT EPISODE MIXED, SEVERE, UNSPECIFIED WHETHER PSYCHOTIC FEATURES (H): ICD-10-CM

## 2024-10-29 DIAGNOSIS — Z34.00 SUPERVISION OF NORMAL FIRST PREGNANCY, ANTEPARTUM: ICD-10-CM

## 2024-10-29 DIAGNOSIS — F31.70 BIPOLAR DISORDER IN PARTIAL REMISSION, MOST RECENT EPISODE UNSPECIFIED TYPE (H): ICD-10-CM

## 2024-10-29 DIAGNOSIS — Z34.00 SUPERVISION OF NORMAL FIRST PREGNANCY, ANTEPARTUM: Primary | ICD-10-CM

## 2024-10-29 PROCEDURE — 76805 OB US >/= 14 WKS SNGL FETUS: CPT

## 2024-10-29 PROCEDURE — 99207 PR PRENATAL VISIT: CPT | Performed by: ADVANCED PRACTICE MIDWIFE

## 2024-10-29 NOTE — PATIENT INSTRUCTIONS
"\"We hope you had a positive experience and that you can definitely recommend [facility] to your family and friends. You ll be receiving a survey soon and we look forward to hearing your feedback\".    [facility] Contact information:  Appointment line and to get a hold of CNM in clinic Monday-Friday 8 am - 5 pm:  [phone].  There are some clinics with early start times (1st appointment 7:40 am) and others with evening hours (last appointment 6:20 pm).  Most are typically open from 8 am to 5 pm.    CNM on call answering service: [phone].  Specify your hospital of choice and leave a brief message for CNM;  will then page CNM who is on call at your specified hospital and you should receive a call back with 15 minutes.  Be sure that your ringer is audible and that you can accept blocked calls so that we can get back in touch with you! This number should be reserved for urgent needs if during the day, before 8 am, after 5 pm, weekends, holidays.    Contact the on-call CNM with warning signs, such as:  vaginal bleeding   Vaginal discharge and itching or pain and burning during urination  Leg/calf pain or swelling on one side  severe abdominal pain  nausea and vomiting more than 4-5 times a day, or if you are unable to keep anything down  fever more than 100.4 degrees F.   Yogiyohart  After each of your visits you are welcome to check [employer] for your visit summary including education and links to information relevant to your pregnancy and/or well woman care.   Find the \"Visits\" tab at the top of the page, you will see a list of recent visits and for each visit a for link for \"View After Visit Summary.\" View of your After Visit Summary will allow you to read our recommendations from your visit, review any education provided, and link to websites with useful information.   If you have any questions or difficulty navigating CallMD, please feel free to contact " "us and we will do our best to direct you.  Meet the Midwives from [facility]  You are invited to an informational meet and greet with [facility] Certified Nurse-Midwives. Our free \"Meet the Midwives\" event is a great opportunity to learn about our midwives' philosophy and experience, the hospitals where we can assist with your birth, and answer questions you may have. Partners, friends, and family are welcome to attend. Currently, this is a virtual event.  Date  Last Thursday of every month at 7 pm.    Link to next (live) meeting  [email or web address]  To Join by Telephone (audio only) Call:   [phone] Phone Conference ID: [phone] #    Childbirth and Parenting Education:     Everyday Miracles:   https://www.everyday-miracles.org/    Free Video Series from [facility]: https://nursing.[facility]/academics/specialty-areas/nurse-midwifery/having-baby-prenatal-videos/having-baby-prenatal-and    Childbirth Education virtual (live) classes: www.Edge Music Network/classes  The Birth Hour: https://WatchFrog/online-childbirth-class/  BirthED: https://www.birthedmn.com/  GABE parenting center: http://amCorewell Health William [facility] University HospitalingTequila Mobile.Drippler/   ([phone] 034-DLHP  [place]: (education, yoga & wellness) www.ONFocus Healthcare  Enlightened Mama: www.enlightenedmama.com   Childbirth collective: (Parent topic nights)  www.childbirthcollective.org/  Hypnobabies:  www.hypnobabiestCoursmosties.com/  Hypnobirthing:  Http://hypnCasey's General StoresrtapiOmat.Drippler/  Hypnobirthing virtual class: www.Numerex/hypnobirthing    Information about doulas:  Childbirth collective: http://www.childbirthcollective.org/  Doulas of [place] (BAILEY):  www.bailey.org  [facility]  project: http://CardioFocustiesdoulaChinaNetCenterject.com/     Early Childhood and Family Education (ECFE):  ECFE offers parents hands-on learning experiences that will nourish a lifetime of teachable " moments.  http://ecfe.info/ecfe-home/    APPS and Podcasts:   Inés Young    Evidence Based Birth  The Birth Hour (for birth stories)   Birthful   Expectful   The Longest Shortest Time  PregnancyPodcast Marianna Cruz  https://www.downtobirthshow.com/    Book Recommendations:   Sheila [place]'s Birthing From Within--first few chapters include a new-age tone, you may prefer to skip it and keep going, because there is good stuff later.  This book recommendation covers emotional preparation, but does cover coping with pain, and use of both pharmacological and nonpharmacological methods.    Guide to Childbirth by Radha Macias  Childbirth Without Fear by Mary Fernandez Read    Dr. Kay' The Pregnancy Book and The Birth Book--the pregnancy book goes month-by month    The Birth Partner by Rain Hernandez    Womanly Art of Breastfeeding by La Leche League Seamless   Bestfeeding by Halina Patel--great pictures    Mothering Your Nursing Toddler, by Gemma Noel.   Addresses dealing with so many of the challenging behaviors of a nursing toddler.  How Weaning Happens, by La Leche League.  Discusses weaning at all ages, from medically necessary weaning of an infant, all the way up to age 5 (or older), with why/why not, and strategies.  Very empowering book both for deciding to wean and deciding not to.    American College of Nurse-Midwives (ACNM) http://www.midwife.org/; look at the informational handouts at http://www.midwife.org/Share-With-Women     www.mymidwife.org    Mother to Baby (Medication and Herbal guidance in pregnancy): http://www.mothertobaby.org  Toll-Free Hotline: 178.331.1244  LactMed (Medication use while breastfeeding): http://toxnet.nlm.nih.gov/newtoxnet/lactmed.htm    Women's Health.gov:  http://www.womenshealth.gov/a-z-topics/index.html    American pregnancy association - http://americanpregnancy.org    Centering Pregnancy (group prenatal care option):  "http://centeringhealthcare.org    March of Dimes www.Quantum Secure.Green Plug     FDA - Nutrition  www.mypyramid.gov  Under \"For Consumers,\" click on \"pregnant and breastfeeding women.\"      Centers for Disease Control and Prevention (CDC) - Vaccines : http://www.cdc.gov/vaccines/       When researching information on the web, question the validity of websites.  The Tactical Awareness Beacon Systems .gov, Decalog andBiophotonic Solutionsorg tend to be more reliable information.  If there are a lot of advertisements, be cautious of the information provided. Stay away from blogs and chat rooms please!     Baby Feeding in the Hospital: Information, Support and Resources    As you prepare for the birth of your child, you will want to consider options for feeding your baby including breast-feeding and/or baby formula. The American Academy of Pediatrics recommends exclusive breast-feeding for the first six months (although any amount of breast-feeding is beneficial).  However, we also understand that breast-feeding is a personal choice and not for everyone. Whether or not you choose to breast-feed, your decision will be respected by our staff.    There are numerous benefits of breast-feeding; here are a few to consider:  Provides antibodies to protect your baby from infections and diseases  The cost: formula can cost over $1,500 per year  Convenience, no warming up or sterilizing bottles and supplies  The physical contact with breastfeeding can make babies feel secure, warm and comforted    What ever my feeding choice, what can I expect after I deliver my baby?  Your baby will usually be placed skin-to-skin immediately following birth. The skin to skin contact between you and your baby will be a special and memorable time. The bonding and attachment comforts your baby and has a positive effect on baby s brain development.   Having your baby  room in  with you also helps you start to learn your baby s body rhythms and sleep cycle.    You will also begin to learn your baby s cues " (signals) that he or she is ready to feed.    When do I start to feed my baby?  As soon as possible after your baby s birth, you will be encouraged to begin feeding.  In the first couple of weeks, your baby will eat often.  Breastfeeding babies usually eat at least 8 times in 24 hours.  Babies fed formula usually eat at least 7 times in 24 hours.      Breast-feeding tips:  Get comfortable and use pillows for support.  Have your baby at the level of your breast, facing you,  tummy to tummy .    Touch your nipple to your baby s lips so you baby s mouth opens wide (rooting reflex).  Aim the nipple toward the roof of your baby s mouth. When your baby opens his or her mouth, pull your baby toward your breast to help your baby  latch on  to your nipple and much of the areola area.  Hand expressing your breast milk can assist with latching your baby to your breast, if needed.  Ask for help, breastfeeding may seem awkward or uncomfortable at first, this is normal. There are numerous resources available at [facility] ([facility]), Clinics and beyond.   If your goal is to exclusively breastfeed, avoid using any formula or artificial nipples (including bottles and pacifiers) while you are your baby are learning to breastfeed unless there is a medical reason.     Mixing breastfeeding and formula can interfere with how you begin building your milk supply.  It can impact how you and your baby  learn  to breastfeeding together and alter the natural growth of  good  bacteria in your baby s stomach.  Delay a pacifier or a bottle in the first few weeks until breastfeeding is well established. This is often around 3 weeks of age.  Ask your nurse to show you how to hand express.   Breast milk can be kept in the refrigerator or freezer for later use.        Touring the Maternity Care Center  [facility] Maternity Care:    https://"Lytx[employer] Inc.".Visus Technology/locations/the-birthplace-at--[facility] Maternity Care:   https://"Lytx[employer] Inc.".Visus Technology/locations/the-birthplace-atLakeWood Health Center  Scroll to the bottom of this page if the above link does not work      Hospital and Clinic Breastfeeding Resources:  -Schedule an appointment with a [facility]   CNM who is also a Lactation Consultant by calling [phone]     -Schedule a clinic appointment with a [facility] CNM with dedicated clinic hours for breastfeeding assistance by calling [phone]. Breastfeeding clinic visits are at [facility] on , [facility] on  and [facility] on .     New Parent Connection:   [facility], [address] [place] [address]  In-person meetings on  from 6 pm - 7:15 pm for parents of  to 9 months, at the same site.   All are free, drop-in, no registration required.    There are also free virtual meetings ongoing on :  11:30 am - 12:30 pm for parents of newborns to 3 months  4:15 pm to 5:15 pm for parents of 3 to 9-month olds  For joining info parents should call Flori Vu at [phone]      [facility] Baby Café  More information  Joanne Blum  [phone]  [email or web address].     -Attend a baby weigh in at [facility].  Lactation consultants are available to answer questions  Tammie:  1:00 - 2:00  [facility]:  1:00 - 2:00  www.[facility] HospitalMinuteman Globaler.com    -Attend one of the New Mama groups at [facility] in [facility].  [facility] also offers one-on-one in home and in office lactation consults.   www.[facility].Cortex    -Attend a LeLeche League meeting.  Multiple groups in several locations throughout the [facility]. The meetings are no-cost and always informative breastfeeding education session through  "Internatal La Leche League  Www.Correlsense.org/    -Medication use while breastfeeding: http://toxnet.nlm.nih.gov/newtoxnet/lactmed.htm     Online Resources:  Breastfeedingmadesimple.com  Llli.org (La Leche League)  Normalfed.com  [facility].gov/breastfeeding  Workandpump.com    Breast-feeding Supplies & Pumps:  Talk to your insurance provider or WIC (Women, Infants and Children) to learn more about options available to you. Recent health insurance changes may include additional coverage for supplies and pumps.    Public Health:  Women, Infants and Children Nutrition program (WIC): provides breast-feeding support and education in addition to formal feeding moms. 440-UWK-0667 or http://www.health.[facility].us/divs/fh/wic    Family Health Home Visiting: [facility] Nurse home visits are available. Talk to your provider to see if you qualify. Most [facility] have a program available.    Additional Resources:  La Leche League is an international, nonprofit, nonsectarian organization offering information, education, and support to mothers who want to breast-feed their babies. Local groups offer phone help and monthly meetings. Visit Gimado or [employer] and us the  Find local support  drop down menu or click on the  Resources  tab.    [place] Breastfeeding Resources: 0-212-294-BABY (2229) toll free    National Breastfeeding Help Line trained breastfeeding peer counselors can help answer common breast-feeding questions by phone. Monday-Friday: English/Macedonian  8-515- 233-3445 toll free, 1-729.521.6495 (TTY)    Virtual Breastfeeding Support:    During this time of isolation, breastfeeding families need even more community!  Here are some area organizations offering virtual support groups for breastfeeding:    Lat Cafe Support Group, Tuesdays at 10:30 am   Run by HILL Mcgee of The Baby Whisperer Lactation Consultants   Go to The Baby Whisperer Lactation Consultants Facebook page and click on \"events\" " for link   https://www.Cycell.com/events/467576341964829/  [facility] Milk Hour,  at 2:30 pm    Run by HILL Stover   Go to [facility] FB page and send message to get link   https://www.Cycell.com/healthfoundations/  [facility]/[place] holding virtual meetings the first Tuesday of each month, 8-9 pm, and the   Third Saturday, 10 - 11 am.  Go to [facility] and [place] FB page; message to get link https://www.Cycell.W-locate/LLLofGoldenValvirgil/?hc_location=[facility]  Jay offers a Lactation Lounge every Friday 12pm - 1pm, run by K[facility]eng[facility] Hayden Leader   Sign up via link at https://www.NeuroChaos Solutions/cbe-lactation  [facility] is offering virtual support groups every Monday, 10:30 am - 12 pm, run by nurse HILL   Https://www.Cycell.com/events/420798544566979/    Prenatal Breastfeeding Classes:      Jay is offering virtual breastfeeding and  care classes:  https://www.NeuroChaos Solutions/education-workshops  BirthEd childbirth and breastfeeding education offering virtual prenatal breastfeeding classes  https://www.Tateâ€™s Bake Shopmn.com/workshops

## 2024-10-29 NOTE — PROGRESS NOTES
Manuela is here with Junito for a routine prenatal visit at 20w4d.  Reviewed negative AFP from last visit.  Has anatomy scan scheduled after this, looking forward to it!  Will find out the sex of baby.  Feeling less fatigued.  Appetite is low though has gained some weight since her last visit.  Recommended daily walking as able.  Quickening 2 weeks ago.  Sleeping fairly well.  Feels like her mood and mental health is stable on current dose of Abilify.  Has not yet established care with Brenden as referred at [facility].  Discussed option for referral within [facility] to  psychiatry, patient accepts and thinks an internal referral will be easier to follow-up on.    Mid-pregnancy anticipatory guidance reviewed.   Enc follow-up in 4weeks or sooner prn.

## 2024-10-30 ENCOUNTER — DOCUMENTATION ONLY (OUTPATIENT)
Dept: MIDWIFE SERVICES | Facility: CLINIC | Age: 29
End: 2024-10-30
Payer: COMMERCIAL

## 2024-10-30 DIAGNOSIS — O44.40 LOW-LYING PLACENTA: Primary | ICD-10-CM

## 2024-11-06 ENCOUNTER — PRE VISIT (OUTPATIENT)
Dept: PSYCHIATRY | Facility: CLINIC | Age: 29
End: 2024-11-06
Payer: COMMERCIAL

## 2024-11-06 NOTE — TELEPHONE ENCOUNTER
PSYCHIATRY CLINIC PHONE INTAKE     SERVICES REQUESTED / INTERESTED IN          Other:  [facility] Med Eval    Presenting Problem and Brief History                              What would you like to be seen for? (brief description):  Pt was diagnosed a year or two ago. Currently taking Abilify 5mg. She has concerns about this medication and the effects it has in 3rd trimester. She would like to discuss this with a psychiatrist. No concerns with sleep or appetite. Pt is currently at 21 weeks.     Have you received a mental health diagnosis? Yes   Which one (s): High Functioning Bi-polar  Is there any history of developmental delay?  No   Are you currently seeing a mental health provider?  Yes            Who / month last seen:  Therapist - Dr.Ronald Rocha at [facility] in [place]  Do you have mental health records elsewhere?  Yes  Will you sign a release so we can obtain them?  Yes    Have you ever been hospitalized for psychiatric reasons?  No  Describe:  NA    Do you have current thoughts of self-harm?  No    Do you currently have thoughts of harming others?  No    Do you have any safety concerns? No   If yes to these, offer to reach out to a  for follow up.      Substance Use History     Do you have any history of alcohol  or other substance use?  No  Describe:  NA  Have you ever received treatment for this?  No    Describe:  NA     Social History     Who is the patient's a guardian?  No    Name / number: NA  Have you had an ACT team in last 12 months?  No  Describe: NA   OK to leave a detailed voicemail?  Yes    Would you be interested in learning more about research opportunities for which you or your child may qualify? We can connect you with a team member for more information.   unknown   If yes, send an Retrieve message to Teresa Amaro    Medical/ Surgical History                                   Patient Active Problem List   Diagnosis    Supervision of normal first pregnancy, antepartum     Bipolar affective disorder, currently active (H)    BMI 30.0-30.9,adult    At risk for complication of pregnancy    Low-lying placenta          Medications             Have you taken >3 psychiatric medications in your past?  No  Do you currently take 5 or more medications, including prescriptions, supplements, and other over the counter products?  Taking prenatal and baby aspirin, prescribed by the midwife.     If YES to at least one of these questions:   As part of your evaluation in our clinic, we have specially trained pharmacists as part of your care team. Your provider would like for you to meet with one of our pharmacists to review your current and past medications, ensure your med list is up to date, and queue up any questions or concerns you have about medications. They will review all of your medications, not just for mental health, to help ensure you know what you re taking and that everything is working together.     Please schedule patient in [facility] PSYCHIATRY (Earnestine Parson or Jaymie Hernandez) for 60m MTM in any green space as virtual (video), telephone, or in person (designated in person days per Epic templates).  -Appt notes can say  Psych eval on xx/xx   -Route telephone encounter to the pharmacist who will be seeing the patient.  If patient has questions about insurance coverage or billing, please still schedule the visit and refer them to call the [facility] coordinators at [phone].    Current Outpatient Medications   Medication Sig Dispense Refill    ARIPiprazole (ABILIFY) 5 MG tablet       aspirin 81 MG EC tablet Take 1 tablet (81 mg) by mouth daily. 90 tablet 1    busPIRone (BUSPAR) 5 MG tablet  (Patient not taking: Reported on 10/29/2024)      Cosmeat MV-Min w/Fe-Folate-DHA (PRENATAL COMPLETE PO) Take by mouth.           DISPOSITION      11/6/24 Intake complete. Scheduled MTM on 12/2/24 at 8am with Earnestine Parson. Schedule [facility] Med Eval on 12/11/24 at 3:00pm with .     Eve  Marky, Lead Complex

## 2024-11-26 ENCOUNTER — PRENATAL OFFICE VISIT (OUTPATIENT)
Dept: MIDWIFE SERVICES | Facility: CLINIC | Age: 29
End: 2024-11-26
Payer: COMMERCIAL

## 2024-11-26 VITALS
HEART RATE: 95 BPM | OXYGEN SATURATION: 99 % | BODY MASS INDEX: 32.4 KG/M2 | SYSTOLIC BLOOD PRESSURE: 110 MMHG | WEIGHT: 155 LBS | DIASTOLIC BLOOD PRESSURE: 68 MMHG

## 2024-11-26 DIAGNOSIS — Z34.00 SUPERVISION OF NORMAL FIRST PREGNANCY, ANTEPARTUM: Primary | ICD-10-CM

## 2024-11-26 PROCEDURE — 99207 PR PRENATAL VISIT: CPT | Performed by: ADVANCED PRACTICE MIDWIFE

## 2024-11-26 NOTE — PROGRESS NOTES
Manuelasasha Gruber is here with Junito  for a routine prenatal visit at 24w4d.  She has no concerns and is feeling well.    She is feeling fetal movement regularly. Fetal survey ultrasound results reviewed today; low lying placenta. Appetite is normal. Interval weight gain is appropriate.     Ultrasound ordered to re-evaluate low lying placenta, patient to schedule at 28 weeks.   Discussed 28 week labs/screening for gestational diabetes, anemia and syphilus are recommended at 26-28 week visit.  Handouts given on glucose testing and TdaP during pregnancy.  Anticipatory guidance, warning signs, when to call and CNM contact information reviewed.    Return to Clinic in 4 weeks

## 2024-12-02 ENCOUNTER — VIRTUAL VISIT (OUTPATIENT)
Dept: PHARMACY | Facility: CLINIC | Age: 29
End: 2024-12-02
Payer: COMMERCIAL

## 2024-12-02 DIAGNOSIS — F39 MOOD DISORDER (H): Primary | ICD-10-CM

## 2024-12-02 PROCEDURE — 99605 MTMS BY PHARM NP 15 MIN: CPT | Mod: 93 | Performed by: PHARMACIST

## 2024-12-02 NOTE — PROGRESS NOTES
"Medication Therapy Management (MTM) Encounter    ASSESSMENT:                            Mental Health   Mood Disorder:   Further evaluation by psychiatrist will be helpful for diagnostic clarity and identifying goals of medication therapy (target symptoms). Do not recommend any medication changes at this time pending further assessment.       PLAN:                            Medications reviewed. Med list updated today.       Follow-up: psychiatry provider assessment 12/11/24    SUBJECTIVE/OBJECTIVE:                          Manuela Gruber is a 29 year old female seen for an initial visit. She was referred to me from psychiatry intake.      Reason for visit: new patient medication review; currently pregnant.    Allergies/ADRs: Reviewed in chart  Past Medical History: Reviewed in chart  Tobacco: She reports that she has never smoked. She has been exposed to tobacco smoke. She has never used smokeless tobacco.  Alcohol: none    Medication Adherence/Access: no issues reported.    Mental Health   Mood Disorder  Aripiprazole 5mg once daily  Buspar (hasn't taken in several months)    Manuela is seen today for MTM as part of [facility] new intake/evaluation. Patient is scheduled to see a psychiatry prescriber on 12/11/24 for psychiatry intake/diagnostic assessment.  Psychiatry medications are currently prescribed by psychiatry provider at [facility].    Today, Manuela reports she received a psychiatric diagnosis of \"high functioning bipolar disorder\" two years ago.  She had no prior psychiatric diagnoses.  She states Abilify was started at that time but she did have brief trials of both olanzapine and risperidone about a year and a half ago.  She has some difficulty identifying symptoms or benefits from the medication but reports her fiancé has indicated he feels Abilify has been helpful for her \"mood swings\".  She does state she had a noticeable benefit when BuSpar was started about 1 year ago " for anxiety.  However, she discontinued it several months ago due to pregnancy and being unsure of its safety in pregnancy.  She feels her current provider at [facility] has been somewhat unsupportive of medications in pregnancy. She denies any current side effects, maybe some weight gain with Abilify initially.     Past medications:   - olanzapine 2.5mg daily - felt it wasn't very effective, maybe wore off  - risperidone 1mg twice daily - trouble taking twice daily           ----------------      I spent 30 minutes with this patient today. A copy of the visit note was provided to the patient's provider(s).    A summary of these recommendations was sent via [employer].    Earnestine Parson, PharmD, BCPP  Medication Therapy Management Pharmacist  [facility]      Telemedicine Visit Details  The patient's medications can be safely assessed via a telemedicine encounter.  Type of service:  Telephone visit  Originating Location (pt. Location): Home    Distant Location (provider location):  Off-site  Start Time:  810a  End Time:  840a     Medication Therapy Recommendations  No medication therapy recommendations to display

## 2024-12-02 NOTE — Clinical Note
Hello,   Just sending as an FYI that I completed an MTM/med review on this new intake patient you are scheduled to see in the future.   Thank you!  Earnestine Parson, PharmD, BCPP Medication Therapy Management Pharmacist [facility]
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
## 2024-12-11 ENCOUNTER — TELEPHONE (OUTPATIENT)
Dept: MIDWIFE SERVICES | Facility: CLINIC | Age: 29
End: 2024-12-11

## 2024-12-11 ENCOUNTER — HOSPITAL ENCOUNTER (OUTPATIENT)
Dept: ULTRASOUND IMAGING | Facility: HOSPITAL | Age: 29
Discharge: HOME | End: 2024-12-11
Attending: ADVANCED PRACTICE MIDWIFE
Payer: COMMERCIAL

## 2024-12-11 ENCOUNTER — VIRTUAL VISIT (OUTPATIENT)
Dept: PSYCHIATRY | Facility: CLINIC | Age: 29
End: 2024-12-11
Attending: STUDENT IN AN ORGANIZED HEALTH CARE EDUCATION/TRAINING PROGRAM
Payer: COMMERCIAL

## 2024-12-11 DIAGNOSIS — F41.9 ANXIETY: ICD-10-CM

## 2024-12-11 DIAGNOSIS — Z34.00 SUPERVISION OF NORMAL FIRST PREGNANCY, ANTEPARTUM: ICD-10-CM

## 2024-12-11 DIAGNOSIS — F39 MOOD DISORDER (H): Primary | ICD-10-CM

## 2024-12-11 PROCEDURE — 76815 OB US LIMITED FETUS(S): CPT

## 2024-12-11 ASSESSMENT — PATIENT HEALTH QUESTIONNAIRE - PHQ9
SUM OF ALL RESPONSES TO PHQ QUESTIONS 1-9: 2
SUM OF ALL RESPONSES TO PHQ QUESTIONS 1-9: 2
10. IF YOU CHECKED OFF ANY PROBLEMS, HOW DIFFICULT HAVE THESE PROBLEMS MADE IT FOR YOU TO DO YOUR WORK, TAKE CARE OF THINGS AT HOME, OR GET ALONG WITH OTHER PEOPLE: NOT DIFFICULT AT ALL

## 2024-12-11 NOTE — NURSING NOTE
Current patient location: [facility] [address]  [facility] [id number]    Is the patient currently in the state of [place]? YES    Visit mode:VIDEO    If the visit is dropped, the patient can be reconnected by:VIDEO VISIT: Text to cell phone:   Telephone Information:   Mobile [phone]       Will anyone else be joining the visit? NO  (If patient encounters technical issues they should call [phone]    Are changes needed to the allergy or medication list? Pt stated no changes to allergies and Pt stated no med changes    Are refills needed on medications prescribed by this physician? NO    Rooming Documentation:  Questionnaire(s) completed    Reason for visit: ERASTO Mathews F

## 2024-12-11 NOTE — TELEPHONE ENCOUNTER
Incoming faxed request for completion of written order for breast pump form received from [employer].  Form faxed to [facility] for completion.  Once completed, please return to [employer] via fax at [phone].

## 2024-12-11 NOTE — PATIENT INSTRUCTIONS
Gera Roy,  It was good to meet you today.    Here are some of the articles we talked about:    https://www.ncbi.nlm.nih.gov/books/UAR800004/    https://link.campos.com/article/10.1007/l72083-554-56294-4    https://www.sciencedirect.com/science/article/pii/W5563730159377602    Excerpt from this particular article:  Conclusions  Although it is not possible to draw definitive conclusions about the risks and benefits of aripiprazole in terms of its safety during pregnancy, the peripartum or breast feeding periods, the published data are relatively reassuring. Indeed, the available information does not contraindicate maternal use of aripiprazole during pregnancy or lactation. However, several issues have to be taken into account while considering aripiprazole treatments for pregnant women, with the goal of individualizing treatment choice via a thorough risk?benefit analysis, based on clinical history, ongoing treatment and current symptoms.    Our review suggests that the risk of adverse events in infants exposed to aripiprazole is low, and therefore the risks of withdrawing this medication during pregnancy should always be weighed against the risks of continuing it. Further prospective studies with larger sample sizes are needed. In our opinion, the existing data support the use of aripiprazole treatment in women at high risk of potentially dangerous relapse, especially when medications with a larger safety database are not indicated, owing to their side effects, to a previous history of non-response or to the risks for a relapse as a result of switching to a different compound, especially for patients that are well stabilized on aripiprazole.          **For crisis resources, please see the information at the end of this document**   Patient Education    Thank you for coming to the [facility].     Lab Testing:  If you had lab testing today and your results are reassuring or normal  "they will be mailed to you or sent through [employer] within 7 days. If the lab tests need quick action we will call you with the results. The phone number we will call with results is # [phone]. If this is not the best number please call our clinic and change the number.     Medication Refills:  If you need any refills please call your pharmacy and they will contact us. Our fax number for refills is [phone].   Three business days of notice are needed for general medication refill requests.   Five business days of notice are needed for controlled substance refill requests.   If you need to change to a different pharmacy, please contact the new pharmacy directly. The new pharmacy will help you get your medications transferred.     Contact Us:  Please call [phone] during business hours (8-5:00 M-F).   If you have medication related questions after clinic hours, or on the weekend, please call [phone].     Financial Assistance [phone]   Medical Records [phone]       To find additional local resources, refer to Postpartum Support International (PSI). Available at: http://www.postpartum.net/get-help/locations/united-states/    -[facility]'s Mental Health at: www.womensmentalhealth.org is a good resource for information about psychiatric medication in pregnancy/lactation    -Mother To Baby A service of the nonprofit Organization of Teratology Information Specialists (KOREY), provides evidence based information online and in printable handouts to provide patients and providers regarding medications and other exposures during pregnancy and lactation Available at: https://mothertobaby.org/fact-sheets-parent/.    -The 4th Trimester Project - Expert written resources and information for mothers and families. Available at: https://Shop Airlines.com/    -Consider using \"The Pregnancy and Postpartum Anxiety Workbook,\" by Shruthi Chowdhury PhD and Ankush Elizondo PsyD.    Postpartum Planning " Tools:  Maternal Wellbeing Plan from [facility]: https://www.health.state.[place]/people/womeninfants/pmad/pmadsfs.html    4th Trimester Postpartum plan: https://GEEKmaister.com/mypostpartumplan    Tips for partners:  http://www.postpartum.net/family/tips-for-postpartum-dads-and-partners/        MENTAL HEALTH CRISIS RESOURCES:  For a emergency help, please call 911 or go to the nearest Emergency Department.     Emergency Walk-In Options:   EmPATH Unit @ [facility] ([place]): [phone] - Specialized mental health emergency area designed to be calming  [facility] ([place]): [phone]  [facility] ([place]): [phone]  [facility] ([place]): [phone]    [facility] Crisis Information:   [place]: [phone]  [place]: [phone]  [place] ([place]) - Adult: [phone]     Child: [phone]  [place] - Adult: [phone]     Child: [phone]  [place]: [phone]  List of all [facility] resources:   https://[place].gov/dhs/people-we-serve/adults/health-care/mental-health/resources/crisis-contacts.jsp    National Crisis Information:   Crisis Text Line: Text  [place]  to 785987  Suicide & Crisis Lifeline: 988  National Suicide Prevention Lifeline: 3-435-403-TALK (1-516.436.2098)       For online chat options, visit https://suicidepreventionlifeline.org/chat/  Poison Control Center: 1-694.929.8609  Trans Lifeline: 1-690.491.6605 - Hotline for transgender people of all ages  The Evelio Project: 1-972.652.8623 - Hotline for LGBT youth     For Non-Emergency Support:   Fast Tracker: Mental Health & Substance Use Disorder Resources -   https://www.iOnRoadn.org/

## 2024-12-12 ENCOUNTER — MYC MEDICAL ADVICE (OUTPATIENT)
Dept: MIDWIFE SERVICES | Facility: CLINIC | Age: 29
End: 2024-12-12
Payer: COMMERCIAL

## 2024-12-12 ENCOUNTER — DOCUMENTATION ONLY (OUTPATIENT)
Dept: MIDWIFE SERVICES | Facility: CLINIC | Age: 29
End: 2024-12-12
Payer: COMMERCIAL

## 2024-12-12 ENCOUNTER — MEDICAL CORRESPONDENCE (OUTPATIENT)
Dept: HEALTH INFORMATION MANAGEMENT | Facility: CLINIC | Age: 29
End: 2024-12-12
Payer: COMMERCIAL

## 2024-12-12 DIAGNOSIS — O44.40 LOW-LYING PLACENTA: Primary | ICD-10-CM

## 2024-12-12 NOTE — TELEPHONE ENCOUNTER
Order signed by KARIE, faxed to [facility] and medical records.  Original in JIC folder at the [place] location.

## 2024-12-20 NOTE — PATIENT INSTRUCTIONS
"\"We hope you had a positive experience and that you can definitely recommend [facility] to your family and friends. You ll be receiving a survey soon and we look forward to hearing your feedback\".    [facility] [facility]  - Contact information:  Appointment line and to get a hold of CNM in clinic Monday-Friday 8 am - 5 pm:  [phone].  There are some clinics with early start times (1st appointment 7:40 am) and others with evening hours (last appointment 6:20 pm).  Most are typically open from 8 am to 5 pm.    CNM on call answering service: [phone].  Specify your hospital of choice and leave a brief message for CNM;  will then page CNM who is on call at your specified hospital and you should receive a call back with 15 minutes.  Be sure that your ringer is audible and that you can accept blocked calls so that we can get back in touch with you! This number should be reserved for urgent needs if during the day, before 8 am, after 5 pm, weekends, holidays.    Contact the on-call CNM with warning signs, such as:  vaginal bleeding   Vaginal discharge and itching or pain and burning during urination  Leg/calf pain or swelling on one side  severe abdominal pain  nausea and vomiting more than 4-5 times a day, or if you are unable to keep anything down  fever more than 100.4 degrees F.     Actimizehart  After each of your visits you are welcome to check Fortnox for your visit summary including education and links to information relevant to your pregnancy and/or well woman care.   Find the \"Visits\" tab at the top of the page, you will see a list of recent visits and for each visit a for link for \"View After Visit Summary.\" View of your After Visit Summary will allow you to read our recommendations from your visit, review any education provided, and link to websites with useful information.   If you have any questions or difficulty navigating CharityStars, please feel free to " "contact us and we will do our best to direct you.  Meet the Midwives from [facility]  You are invited to an informational meet and greet with [facility]'s [facility] Certified Nurse-Midwives. Our free \"Meet the Midwives\" event is a great opportunity to learn about our midwives' philosophy and experience, the hospitals where we can assist with your birth, and answer questions you may have. Partners, friends, and family are welcome to attend. Currently, this is a virtual event.  Date  Last Thursday of every month at 7 pm.    Link to next (live) meeting  https://[facility]"[employer].org/meet-the-midwives  To Join by Telephone (audio only) Call:   [phone] Phone Conference ID: [phone] #      Touring the Maternity Care Center  At this time we are offering a virtual tour of the Maternity Care Centers at both [facility] and [facility]:   [facility] Maternity Care:   https://[facility].[employer]/locations/the-birthplace-at--[facility] Maternity Care:   https://[employer].[facility]/locations/the-[facility]place-[facility]    Scroll to the bottom of the page in this link if the above link does not work.      Breastfeeding and Birth Control  How do I decide what birth control method is best for me while I am breastfeeding?  Choosing a method of birth control is very personal. First, answer the following questions:     Do you want to have more children?   How much spacing between births do you want for your children?   Do you smoke or have you had any health problems, such as liver disease or a blood clot?  Talk about the answers to each of these questions with your health care provider to help you choose the best method for you.    Can I use breastfeeding as my birth control?  Using breastfeeding as your birth control (the lactational amenorrhea method) can be a good way to keep from getting pregnant in the first " months after the baby is born. Each time your baby nurses, your body releases a hormone called prolactin, which stops your body from making the hormones that cause you to ovulate (release an egg). If you are not ovulating, you cannot get pregnant.    The lactational amenorrhea method works only if:   you have not started your period yet.   you are breastfeeding only and not giving your baby any other food or drink.   you are breastfeeding at least every 4 hours during the day and every 6 hours at night.   your baby is less than 6 months old.  When any 1 of these 4 things is not happening, you no longer have good protection from getting pregnant, and you should use another form of birth control.    What birth control methods are safe for me to use while I breastfeed?    Methods without hormones  Methods without hormones do not affect you, your baby, or your breastfeeding.  Methods without hormones that are the most effective   The copper intrauterine contraceptive device (IUD) (ParaGard) is a small, T-shaped device that is in- serted into your uterus (womb) through the vagina and cervix. The copper IUD lasts for 10 years.   Sterilization (getting your tubes tied or your partner having a vasectomy) is very effective, but it is per- manent. You should choose sterilization only if you do not want to have more children.  A method without hormones that is effective   The lactational amenorrhea method described above is effective for the first 6 months.  Methods without hormones that are less effective   Natural family planning is monitoring your body for signs of ovulation and not having sex when you think you are ovulating. This method is reliable only if you are having regular periods every month.   Barrier methods (condoms, diaphragms, sponges, and spermicides) are used at the time you have sex. These methods are effective only if you use them correctly every time.    Methods with hormones  Birth control methods that  use hormones can be used while you are breastfeeding. They may have a small effect on lowering the amount of milk you make. All hormones will get into your breast milk in very small amounts, but there is no known harm to your baby from this small amount of hormone in breast milk.only methodsmethods use only 1 hormone, called progestin. You can start them right after your baby is born or wait 4 to 6 weeks to make sure your milk supply is good.   Progestin-only pills ( minipills ): If you like to take pills every day, you can use the minipill. In order forpill to work well, you have to take 1 at the same time each day. When you stop breastfeeding, you should start pills that have both estrogen and progestin because they are better at keeping you from get- ting pregnant.   Progestin IUD (Mirena): The progestin IUD is shaped and inserted into the uterus like the copper IUD. It works for up to 5 years. Both IUDs are usually inserted 4 to 6 weeks after the baby is born.  Progestin implant (Nexplanon): The progestin implant is a small matchstick-sized flexible shruti. It is placed into the fatty tissue in the back of your arm. It works for up to 3 years.  Progestin shot (Depo-Provera): The progestin shot is given every 3 months.    estrogen and progestin methods  These methods use 2 hormones, called estrogen and progestin.     These methods increase your risk of a blood clot, which is already higher than normal after you have a baby. You should not use them until your baby is at least 6 weeks old. The combined methods are not recommended as the first choice for women who are breastfeeding. If a combined method is the one that you feel will be best for you to prevent getting pregnant, these methods are okay to use while breastfeeding.   Combined birth control pills: You take a pill each day.  Vaginal ring (NuvaRing): The ring is worn in the vagina for 3 weeks then left out for 1 week before youin a new ring.  Patch (Ortho  Lili): The patch is placed on your skin and changed every week for 3 weeks then left off forweek before putting a new patch on a different area of your skin.    Pediatric Care Providers at [facility]:    Choosing the right provider is one of the most important decisions you ll make about your health care. We can help you find the right one. Remember, you re looking for a provider you can trust and work with to improve your health and well-being, so take time to think about what you need. Depending on how complicated your health care needs are, you may need to see more than one type of provider.    Primary Care Providers: You ll see a primary care provider first for most health issues. They ll work with you to get your recommended screenings, help you manage chronic conditions, and refer you to other types of providers if you need them. Your primary care provider may be called a family physician or doctor, internist, general practitioner, nurse practitioner, or physician s assistant. Your child or teenager s provider may be called a pediatrician.  Specialists: You ll see a specialist for certain services or to treat specific conditions. Specialists include cardiologists, oncologists, psychologists, allergists, podiatrists, and orthopedists. You may need a referral from your primary care provider before you go to a specialist in order for your health plan to pay for your visit.\[place] are some tips for finding a provider where you live:  If you already have a provider you like and want to keep working with, call their office and ask if they accept your coverage.  Call your insurance company or state Medicaid and CHIP program. Look at their website or check your member handbook to find providers in your network who take your health coverage.  Ask your friends or family if they have providers they like and use these tools to compare health care providers in your area.    Family Medicine at  [facility]:      https://www.[place].org/specialties/family-medicine    Many of our families enjoy all seeing the same doctor, who comes to know the whole family very well. We base our practice on the knowledge of the patient in the context of family and community.  WHY CHOOSE A FAMILY MEDICINE PHYSICIAN?  Ability of the whole family to see the same doctor  Focus on the whole person, including physical and emotional health  A personal relationship with their doctor that is nurtured over time  Respect for individual and family beliefs and values  No need to change primary care providers when a certain age is reached  Coordination of care when other health care services are needed    Pediatrics at  [facility]:   https://www.[place].org/specialties/pediatric-care    Through a teaching affiliation with the [facility], [facility] staff keeps current on new developments in the field of pediatrics.     Everything we do centers around caring for children. We place special emphasis on wellness and prevention.pediatric care team includes a team of pediatricians and certified nurse practitioners who provide care to pediatric and adolescent patients ages 0 to 18, and some up to the age of 26. We offer preventive health maintenance for healthy children as well the diagnosis and treatment of common and chronic illnesses and injuries. In addition, we also offer several pediatric specialists who focus on adolescent health issues and developmental and behavioral issues.    Circumcision    Educational video:     [email or web address]    For More Information  American Academy of Family Physicians: Circumcision  http://familydoctor.org/familydoctor/en/pregnancy-newborns/caring-for-newborns/infant- care/circumcision.html  MedlinePlus: Circumcision (includes a slide show on the procedure)  www.nlm.nih.gov/medlineplus/circumcision.html  American Academy of Pediatrics:  Policy statement on circumcision  Http://pediatrics.aappublications.org/content/130/3/e756.abstract      Childbirth and Parenting Education:     Everyday Miracles:   https://www.everyday-miracles.org/    Free Video Series from [facility]: https://nursing.[facility]/academics/specialty-areas/nurse-midwifery/having-baby-prenatal-videos/having-baby-prenatal-and    Childbirth Education virtual (live) classes: www.bookletmobile/classes  The Birth Hour: https://Limtel/online-childbirth-class/  BirthED: https://www.birthedmn.com/  GABE parenting center: http://Chairish/   ([phone] 939-IBGP  Blooma: (education, yoga & wellness) www.Poshmark  Enlightened Mama: www.enlightenedmama.Browster   Childbirth collective: (Parent topic nights)  www.childbirthcollective.org/  Hypnobabies:  www.hypnobabiestSmartFocus.Browster/  Hypnobirthing:  Http://PlayMobs.Browster/  Hypnobirthing virtual class: www.Dermira/hypnobirthing    Information about doulas:  Childbirth collective: http://www.childbirthcollective.org/  Doulas of [place] (BAILEY):  www.bailey.org  [facility]  project: http://twincitiesdoulaproject.com/     Early Childhood and Family Education (ECFE):  ECFE offers parents hands-on learning experiences that will nourish a lifetime of teachable moments.  http://ecfe.info/ecfe-home/    APPS and Podcasts:   Inés Cormier Nurture    Evidence Based Birth  The Birth Hour (for birth stories)   Birthful   Expectful   The Longest Shortest Time  PregnancyPodcsunil Cruz  https://www.downtobirthshow.com/    Book Recommendations:   Sheila Versailles's Birthing From Within--first few chapters include a new-age tone, you may prefer to skip it and keep going, because there is good stuff later.  This book recommendation covers emotional preparation, but does cover coping with pain, and use of both pharmacological and nonpharmacological methods.    Guide to Childbirth by Radha May  "Colleen  Childbirth Without Fear by Mary Fernandez Read    Dr. Kay' The Pregnancy Book and The Birth Book--the pregnancy book goes month-by month    The Birth Partner by Rain Hernandez    Womanly Art of Breastfeeding by La Leche League International   Bestfeeding by Halina Patel--great pictures    Mothering Your Nursing Toddler, by Gemma Noel.   Addresses dealing with so many of the challenging behaviors of a nursing toddler.  How Weaning Happens, by La Leche League.  Discusses weaning at all ages, from medically necessary weaning of an infant, all the way up to age 5 (or older), with why/why not, and strategies.  Very empowering book both for deciding to wean and deciding not to.    American College of Nurse-Midwives (ACNM) http://www.midwife.org/; look at the informational handouts at http://www.midwife.org/Share-With-Women     www.mymidwife.org    Mother to Baby (Medication and Herbal guidance in pregnancy): http://www.mothertobaby.org  Toll-Free Hotline: 337.124.5053  LactMed (Medication use while breastfeeding): http://toxnet.nlm.nih.gov/newtoxnet/lactmed.htm    Women's Health.gov:  http://www.womenshealth.gov/a-z-topics/index.html    American pregnancy association - http://americanpregnancy.org    Centering Pregnancy (group prenatal care option): http://centeringhealthcare.org    March of Dimes www.ENJORE.com     FDA - Nutrition  www.mypyramid.gov  Under \"For Consumers,\" click on \"pregnant and breastfeeding women.\"      Centers for Disease Control and Prevention (CDC) - Vaccines : http://www.cdc.gov/vaccines/       When researching information on the web, question the validity of websites.  The domains .gov, .edu and.org tend to be more reliable information.  If there are a lot of advertisements, be cautious of the information provided. Stay away from blogs and chat rooms please!     Virtual Breastfeeding Support:    During this time of isolation, breastfeeding families need even more community! " " Here are some area organizations offering virtual support groups for breastfeeding:    Lat[employer]ha [employer] Support Group,  at 10:30 am   Run by HILL Mcgee of [employer]act[employer]   Go to [employer] Facebook page and click on \"events\" for link   https://www.facebook.com/events/903171405729443/  [facility] Milk Hour,  at 2:30 pm    Run by HILL Stover   Go to [facility] FB page and send message to get link   https://www.MobileMD.com/[facility] Medical Centerundations/  [facility] holding virtual meetings the first Tuesday of each month, 8-9 pm, and the   Third Saturday, 10 - 11 am.  Go to [facility] and [place] FB page; message to get link https://www.MobileMD.Ziliko/LLLofGoldNegar/?hc_location=[facility] offers a Lactation Lounge every Friday 12pm - 1pm, run by Yomaira Alonzo [facility] Leader   Sign up via link at https://www.thredUP/cbe-lactation  [facility] is offering virtual support groups every Monday, 10:30 am - 12 pm, run by nurse IBCLC   Https://www.facebook.com/events/688384351452005/    Prenatal Breastfeeding Classes:      [facility] is offering virtual breastfeeding and  care classes:  https://www.thredUP/education-workshops  BirthEd childbirth and breastfeeding education offering virtual prenatal breastfeeding classes  https://www.birthedmn.com/workshops    Breastfeeding clinic visits are at [facility] on , [facility] on  and [facility] on . Call to schedule, [phone].    New Parent Connection:   [place], [address] [facility]  In-person meetings on  from 6 pm - 7:15 pm for parents of  to 9 months, at the same site.   All are free, drop-in, no registration required.    There are also free virtual meetings ongoing on " Tuesdays:  11:30 am - 12:30 pm for parents of newborns to 3 months  4:15 pm to 5:15 pm for parents of 3 to 9-month olds  For joining info parents should call Flori Vu at [phone]

## 2024-12-20 NOTE — PROGRESS NOTES
Manuela is here for a routine prenatal visit at 28w0d. Reports normal fetal movements. Denies PTL including, regular painful contractions, bleeding, leaking or changes in fetal movement.  Had 2 episodes of vomiting earlier today and was instructed to go to the ED, but due to the wait did not stay.  Her vomiting has not continued and the nausea has resolved.  Feeling regular fetal movements and denies signs of  labor or dehydration.    Established care with the [facility] ([facility] [place]) on 24 and feels comfortable with pan of care.  Will follow-up in 1 month.    Questions answered about CBE, birth plans, cord blood banking, follow-up ultrasound results.    Will defer GCT, hgb, RPR due to vomiting earlier day. Orders entered for lab only to complete within the week.     Accepts Tdap for fetal protection of pertussis today.     Reviewed counseling on  labor precautions, warning signs and symptoms, and when/how to contact the on-call CNM. RTC 2 wks.

## 2024-12-23 ENCOUNTER — NURSE TRIAGE (OUTPATIENT)
Dept: MIDWIFE SERVICES | Facility: CLINIC | Age: 29
End: 2024-12-23

## 2024-12-23 ENCOUNTER — TELEPHONE (OUTPATIENT)
Dept: MIDWIFE SERVICES | Facility: CLINIC | Age: 29
End: 2024-12-23

## 2024-12-23 ENCOUNTER — PRENATAL OFFICE VISIT (OUTPATIENT)
Dept: MIDWIFE SERVICES | Facility: CLINIC | Age: 29
End: 2024-12-23
Payer: COMMERCIAL

## 2024-12-23 ENCOUNTER — HOSPITAL ENCOUNTER (EMERGENCY)
Facility: HOSPITAL | Age: 29
Discharge: LEFT BEFORE DISCHARGE | End: 2024-12-23
Admitting: EMERGENCY MEDICINE
Payer: COMMERCIAL

## 2024-12-23 VITALS
HEART RATE: 100 BPM | SYSTOLIC BLOOD PRESSURE: 109 MMHG | OXYGEN SATURATION: 97 % | BODY MASS INDEX: 33.65 KG/M2 | DIASTOLIC BLOOD PRESSURE: 67 MMHG | WEIGHT: 161 LBS | RESPIRATION RATE: 16 BRPM | TEMPERATURE: 97.9 F

## 2024-12-23 VITALS
OXYGEN SATURATION: 99 % | WEIGHT: 162 LBS | DIASTOLIC BLOOD PRESSURE: 62 MMHG | BODY MASS INDEX: 33.86 KG/M2 | HEART RATE: 109 BPM | SYSTOLIC BLOOD PRESSURE: 112 MMHG

## 2024-12-23 DIAGNOSIS — Z34.00 SUPERVISION OF NORMAL FIRST PREGNANCY, ANTEPARTUM: Primary | ICD-10-CM

## 2024-12-23 DIAGNOSIS — F31.63 BIPOLAR DISORDER, CURRENT EPISODE MIXED, SEVERE, UNSPECIFIED WHETHER PSYCHOTIC FEATURES (H): ICD-10-CM

## 2024-12-23 PROCEDURE — 99207 PR PRENATAL VISIT: CPT | Performed by: ADVANCED PRACTICE MIDWIFE

## 2024-12-23 PROCEDURE — 99281 EMR DPT VST MAYX REQ PHY/QHP: CPT

## 2024-12-23 PROCEDURE — 90715 TDAP VACCINE 7 YRS/> IM: CPT | Performed by: ADVANCED PRACTICE MIDWIFE

## 2024-12-23 PROCEDURE — 90471 IMMUNIZATION ADMIN: CPT | Performed by: ADVANCED PRACTICE MIDWIFE

## 2024-12-23 ASSESSMENT — COLUMBIA-SUICIDE SEVERITY RATING SCALE - C-SSRS
6. HAVE YOU EVER DONE ANYTHING, STARTED TO DO ANYTHING, OR PREPARED TO DO ANYTHING TO END YOUR LIFE?: NO
1. IN THE PAST MONTH, HAVE YOU WISHED YOU WERE DEAD OR WISHED YOU COULD GO TO SLEEP AND NOT WAKE UP?: NO
2. HAVE YOU ACTUALLY HAD ANY THOUGHTS OF KILLING YOURSELF IN THE PAST MONTH?: NO

## 2024-12-23 NOTE — NURSING NOTE
Prior to immunization administration, verified patients identity using patient s name and date of birth. Please see Immunization Activity for additional information.     Screening Questionnaire for Adult Immunization    Are you sick today?   Yes, vomited denies fever   Do you have allergies to medications, food, a vaccine component or latex?   No   Have you ever had a serious reaction after receiving a vaccination?   No   Do you have a long-term health problem with heart, lung, kidney, or metabolic disease (e.g., diabetes), asthma, a blood disorder, no spleen, complement component deficiency, a cochlear implant, or a spinal fluid leak?  Are you on long-term aspirin therapy?   Yes on baby aspirin during pregnancy    Do you have cancer, leukemia, HIV/AIDS, or any other immune system problem?   No   Do you have a parent, brother, or sister with an immune system problem?   No   In the past 3 months, have you taken medications that affect  your immune system, such as prednisone, other steroids, or anticancer drugs; drugs for the treatment of rheumatoid arthritis, Crohn s disease, or psoriasis; or have you had radiation treatments?   No   Have you had a seizure, or a brain or other nervous system problem?   No   During the past year, have you received a transfusion of blood or blood    products, or been given immune (gamma) globulin or antiviral drug?   No   For women: Are you pregnant or is there a chance you could become       pregnant during the next month?   Yes pregnant   Have you received any vaccinations in the past 4 weeks?   No     Immunization questionnaire was positive for at least one answer.  Notified Ayala Stephen CNM.      Patient instructed to remain in clinic for 15 minutes afterwards, and to report any adverse reactions.     Screening performed by Liberty Oseguera LPN on 12/23/2024 at 4:28 PM.

## 2024-12-23 NOTE — TELEPHONE ENCOUNTER
Patient has an OB appointment today at 3:45 PM. She has not ate anything yet today. Advised her to eat something and drink some water and come in for her appointment at 3:45 PM. I told her that is sx worsen she needs to go into the ER to be seen. If new non-concerning sx start she can call back for further guidance.    Reason for Disposition   Dizziness from low blood sugar (i.e., < 60 mg/dl or 3.5 mmol/l)    Additional Information   Negative: SEVERE difficulty breathing (e.g., struggling for each breath, speaks in single words)   Negative: Shock suspected (e.g., cold/pale/clammy skin, too weak to stand, low BP, rapid pulse)   Negative: Difficult to awaken or acting confused (e.g., disoriented, slurred speech)   Negative: Fainted, and still feels dizzy afterwards   Negative: Overdose (accidental or intentional) of medications   Negative: New neurologic deficit that is present now: * Weakness of the face, arm, or leg on one side of the body * Numbness of the face, arm, or leg on one side of the body * Loss of speech or garbled speech   Negative: Sounds like a life-threatening emergency to the triager   Negative: Unconscious or difficult to awaken   Negative: Seizure occurs   Negative: Acting confused (e.g., disoriented, slurred speech)   Negative: Very weak (can't stand)   Negative: Sounds like a life-threatening emergency to the triager   Negative: Vomiting and signs of dehydration (e.g., very dry mouth, lightheaded, dark urine, etc.)   Negative: Patient sounds very sick or weak to the triager    Protocols used: Dizziness-A-OH, Diabetes - Low Blood Sugar-A-OH

## 2024-12-23 NOTE — TELEPHONE ENCOUNTER
A: 1.  29-year-old  1 para 0 with IUP at 28+3 weeks  2.  Nausea and vomiting x 1 today  3.  No evidence of  labor  4.  Bipolar affect disorder, medicated and stable    P: Praise given to patient for leaving the emergency department as her presenting symptoms do not warrant emergency rooms evaluation and so far, IV fluid hydration.  On-call CNM 24-hour phone number given to patient and encouraged patient to contact CNM this way with any future urgent/emergent needs while pregnant.  Explained that since vomiting occurred only once, focus on p.o. fluid hydration at this time with only bites of bland (CARLOS diet) solid food if hungry such as: Banana, crackers, dry toast, pretzels etc. and to avoid dairy, grease and spices.   labor precautions and danger signs and symptoms reviewed.  All questions answered.  Encouraged to keep 4 PM return OB appointment for this afternoon with certified nurse midwife Ayala Stephen.  This writer chose not to call ADS within the [facility] as her subjective symptoms do not seem to warrant IV fluid hydration or IV Zofran at this time.  Patient has verbal understanding of and agrees with the plan of care.    S: Patient contacted RN triage line just after 11 AM this morning with the following concerns (please see telephone note): Patient was working from home as a  for the [employer] and felt flushed, dizzy and with ringing in her ears.  She went to lie down on her left side and immediately felt better although with continued tinnitus.  Her fiancé gave her water to drink, and she felt even better.  She went back to her work from home.  Her fiancé called the nurse triage line who recommended that she eat something after which she vomited x 1.  She ate chicken robertson and ranch Posta dish which she normally loves.  Baby continues to be active, and she denies regular uterine contractions, loss of fluid or vaginal bleeding.  Due to triage nurse recommendation,  they went to [facility] emergency department which was overflowing with patient's, ambulance is coming in, and people declaring that they were diagnosed with COVID-19.  The patient did not feel as though she needed to stay in the emergency room which promised a very long wait to see a medical provider.  She has an appointment today at 4 PM with her CNM.  Her vomiting has not continued.    O: Alert and in no apparent distress

## 2024-12-23 NOTE — TELEPHONE ENCOUNTER
Health Call Center    Phone Message    May a detailed message be left on voicemail: yes     Reason for Call: Symptoms or Concerns     If patient has red-flag symptoms, warm transfer to triage line    Current symptom or concern: Patient is calling back, she had spoke with a triage nurse who told her to go to the ER but there is no rooms so she is sitting out in the lobby. She threw up after her lunch and with the nausea and when she had ringing in her ears what's what got her to go to the ER. She is wondering if the provider could fit her in sooner so she didn't have to sit and wait at the ER. There does not look to be any sooner openings but she asked if I would send a TE. Please call the patient back. Thank you.     Otherwise she said she would still like to be seen today at the 4pm spot as scheduled.     Action Taken: Other: obgyn    Travel Screening: Not Applicable     Date of Service:

## 2024-12-23 NOTE — ED TRIAGE NOTES
She had dizziness earlier today. It had gone away for about 45 minutes. She then tried to drink and then vomited. She continues to have slight dizziness with nausea. Denies pain at this time. Has not had anything like this before. Last known well would be 1045am or two hours ago. She is currently pregnant at 28 weeks with first pregnancy.

## 2025-01-09 ENCOUNTER — LAB (OUTPATIENT)
Dept: LAB | Facility: CLINIC | Age: 30
End: 2025-01-09
Payer: COMMERCIAL

## 2025-01-09 DIAGNOSIS — Z34.00 SUPERVISION OF NORMAL FIRST PREGNANCY, ANTEPARTUM: ICD-10-CM

## 2025-01-12 PROBLEM — Z34.00 SUPERVISION OF NORMAL FIRST PREGNANCY, ANTEPARTUM: Status: ACTIVE | Noted: 2024-08-26

## 2025-01-12 NOTE — PROGRESS NOTES
Manuela presents to [facility] clinic for a routine prenatal visit at 31w5d. Feeling well.  1 hr GCT, hemoglobin, RPR done  today  3 hr ordered for this week if possible.due to low hgb today, CBC, ferritin, iron studies added on today  Pt has bipolar disorder (on abilify, stable).  Reports normal fetal movements. Discussed DF. Denies regular painful contractions, bleeding, leaking, changes in fetal movement.   Offers no questions or concerns.   .    32 week pregnancy checklist addressed.   CBE  Breastfeeding yes  Peds provider [facility]  PP supports Junito (flexible) and family     Reviewed  labor precautions, warning signs and when/how to call the on-call CNM.     RTC in 2 wks

## 2025-01-15 ENCOUNTER — LAB (OUTPATIENT)
Dept: LAB | Facility: CLINIC | Age: 30
End: 2025-01-15
Payer: COMMERCIAL

## 2025-01-15 ENCOUNTER — PRENATAL OFFICE VISIT (OUTPATIENT)
Dept: MIDWIFE SERVICES | Facility: CLINIC | Age: 30
End: 2025-01-15
Payer: COMMERCIAL

## 2025-01-15 VITALS
BODY MASS INDEX: 34.49 KG/M2 | WEIGHT: 165 LBS | HEART RATE: 97 BPM | SYSTOLIC BLOOD PRESSURE: 114 MMHG | DIASTOLIC BLOOD PRESSURE: 66 MMHG | OXYGEN SATURATION: 99 %

## 2025-01-15 DIAGNOSIS — Z34.00 SUPERVISION OF NORMAL FIRST PREGNANCY, ANTEPARTUM: Primary | ICD-10-CM

## 2025-01-15 DIAGNOSIS — Z91.89 AT RISK FOR COMPLICATION OF PREGNANCY: ICD-10-CM

## 2025-01-15 DIAGNOSIS — D64.9 ANEMIA, UNSPECIFIED TYPE: ICD-10-CM

## 2025-01-15 DIAGNOSIS — Z34.00 SUPERVISION OF NORMAL FIRST PREGNANCY, ANTEPARTUM: ICD-10-CM

## 2025-01-15 DIAGNOSIS — F31.63 BIPOLAR DISORDER, CURRENT EPISODE MIXED, SEVERE, UNSPECIFIED WHETHER PSYCHOTIC FEATURES (H): ICD-10-CM

## 2025-01-15 DIAGNOSIS — R73.09 ALTERED GLUCOSE METABOLISM: ICD-10-CM

## 2025-01-15 LAB
BASOPHILS # BLD AUTO: 0 10E3/UL (ref 0–0.2)
BASOPHILS NFR BLD AUTO: 0 %
EOSINOPHIL # BLD AUTO: 0 10E3/UL (ref 0–0.7)
EOSINOPHIL NFR BLD AUTO: 0 %
ERYTHROCYTE [DISTWIDTH] IN BLOOD BY AUTOMATED COUNT: 14.4 % (ref 10–15)
FERRITIN SERPL-MCNC: 8 NG/ML (ref 6–175)
GLUCOSE 1H P 50 G GLC PO SERPL-MCNC: 135 MG/DL (ref 70–129)
HCT VFR BLD AUTO: 29.1 % (ref 35–47)
HGB BLD-MCNC: 9.2 G/DL (ref 11.7–15.7)
HGB BLD-MCNC: 9.2 G/DL (ref 11.7–15.7)
IMM GRANULOCYTES # BLD: 0.2 10E3/UL
IMM GRANULOCYTES NFR BLD: 1 %
IRON BINDING CAPACITY (ROCHE): 562 UG/DL (ref 240–430)
IRON SATN MFR SERPL: 5 % (ref 15–46)
IRON SERPL-MCNC: 26 UG/DL (ref 37–145)
LYMPHOCYTES # BLD AUTO: 1.1 10E3/UL (ref 0.8–5.3)
LYMPHOCYTES NFR BLD AUTO: 10 %
MCH RBC QN AUTO: 27.6 PG (ref 26.5–33)
MCHC RBC AUTO-ENTMCNC: 31.6 G/DL (ref 31.5–36.5)
MCV RBC AUTO: 87 FL (ref 78–100)
MONOCYTES # BLD AUTO: 0.9 10E3/UL (ref 0–1.3)
MONOCYTES NFR BLD AUTO: 7 %
NEUTROPHILS # BLD AUTO: 9.4 10E3/UL (ref 1.6–8.3)
NEUTROPHILS NFR BLD AUTO: 81 %
PLATELET # BLD AUTO: 243 10E3/UL (ref 150–450)
RBC # BLD AUTO: 3.33 10E6/UL (ref 3.8–5.2)
WBC # BLD AUTO: 11.6 10E3/UL (ref 4–11)

## 2025-01-15 PROCEDURE — 83540 ASSAY OF IRON: CPT | Performed by: MIDWIFE

## 2025-01-15 PROCEDURE — 99207 PR PRENATAL VISIT: CPT | Performed by: MIDWIFE

## 2025-01-15 PROCEDURE — 86780 TREPONEMA PALLIDUM: CPT

## 2025-01-15 PROCEDURE — 82728 ASSAY OF FERRITIN: CPT | Performed by: MIDWIFE

## 2025-01-15 PROCEDURE — 82950 GLUCOSE TEST: CPT

## 2025-01-15 PROCEDURE — 85025 COMPLETE CBC W/AUTO DIFF WBC: CPT | Performed by: MIDWIFE

## 2025-01-15 PROCEDURE — 83550 IRON BINDING TEST: CPT | Performed by: MIDWIFE

## 2025-01-15 PROCEDURE — 36415 COLL VENOUS BLD VENIPUNCTURE: CPT

## 2025-01-16 PROBLEM — O99.019 ANTEPARTUM ANEMIA: Status: ACTIVE | Noted: 2025-01-16

## 2025-01-16 LAB — T PALLIDUM AB SER QL: NONREACTIVE

## 2025-01-21 NOTE — PROGRESS NOTES
"    Virtual Visit Details    Type of service:  Video Visit   Video Start Time:  2: 43PM  Video End Time:  3:08 PM    Originating Location (pt. Location): Home    Distant Location (provider location):  Off-site  Platform used for Video Visit: [facility]  Women's Wellbeing Program  CONSULTATION FOLLOW UP       CARE TEAM:    PCP- Ayala Stephen  Therapist- Better help  Psychiatry: [facility]  OBGYN- Ayala Stephen      Manuela is a 29 year old  who uses the pronouns she, her, hers. currently [id number] weeks pregnant, referred by Ayala Stephen    Partner/Support: Engaged(Junito)  Feeding Method: N/A                  Assessment & Plan   {  Mood disorder, unspecified r/o Bipolar disorder  Anxiety disorder, unspecified    Manuela Gruber is a  29 year old female at 32w5d weeks pregnant with past psych hx significant for anxiety and \"high functioning bipolar disorder\"  who presents today for a follow up.    Today, Manuela reports stable mood. Has been feeling some physical fatigue and restless leg that gets in the way of sleep onset. We discussed utilizing Magnesium glycinate which Manuela is open to trying. We will keep Abilify at the current dose. No SI, HI, or acute safety concerns. We plan to have a postpartum planning visit at the end of February.        Psychotropic Drug Interactions:    N/A  Management: limit med redundancy    [place] was checked today: not using controlled substances    Risk Statements:   Treatment Risk: Risks, benefits, alternatives and potential adverse effects have been discussed and are understood.   Safety Risk: Manuela did not appear to be an imminent safety risk to self or others.    PLAN    1) Medications:   - Continue Abilify 5mg daily  -Start Magnesium glycinate 200mg BID (will get OTC)    2) Psychotherapy: Referral sent    3) Next due:  Labs: " Antipsychotics lab due   EKG: Due   Rating scales:  SHANA-7, PHQ-9    4) Referrals: Internal therapy -[facility]    5) Follow-up: Return to clinic in 4 weeks      Pertinent Background  {  Manuela first experienced severe mood swings about two years ago after she went through a relationship stressor. Took some online tests and got a diagnosis of high functioning bipolar which was confirmed by a psychiatrist at [facility] and was placed on some medications. She changed medications because she didn't like them until she landed on Abilify. A year ago, she was diagnosed with anxiety and prescribed buspirone, She stopped buspirone when she got pregnant. She was recommended to stop all medications and they didn't really provide reasons for why she shouldn't;t take medications.     Pertinent items include:  mood lability & anxiety                   Interim History    -Reports that she's doing okay.  -Feels tired and still experiencing restless legs that get in the way of falling asleep. When she falls asleep, she's able to sustain sleep.  -Preparing for baby is underway. Having her last baby shower on Sunday and plans to launder clothing and arranging the house in some weeks.  -Her first shower went okay and has been enjoyable.  -Has been planning things out with her fiwalter around sleep consolidation and work schedules and parental leaves.  -Has a birth plan that has been shared with her midwives and feels comfortable with the plan.    Current Social History:  Financial/occupational:  at [employer](full time) and on-call  at a bar/restaurant  Living situation: Lives with her carolin  Social/spiritual support: Fiance, best friend, father      Pertinent Current Substance Use:  None currently    Contraception/Reproductive plans : Currently pregnant                Physical Exam  (Vitals Only)    LMP  (LMP Unknown)   Pulse Readings from Last 3 Encounters:   01/15/25 97   12/23/24 100   12/23/24 109     Wt Readings from  Last 3 Encounters:   01/15/25 74.8 kg (165 lb)   24 73 kg (161 lb)   24 73.5 kg (162 lb)     BP Readings from Last 3 Encounters:   01/15/25 114/66   24 109/67   24 112/62                      Mental Status Exam    Alertness: alert  and oriented  Appearance: well groomed  Behavior/Demeanor: cooperative, with good  eye contact   Speech: regular rate and rhythm  Language:  Fluent in English  Psychomotor: normal or unremarkable  Mood: description consistent with euthymia  Affect: full range; congruent to: mood- yes, content- yes  Thought Process/Associations:  linear and logical  Thought Content:  Reports none;  Denies suicidal ideation and violent ideation  Perception:  Reports none;  Denies auditory hallucinations and visual hallucinations  Insight: good  Judgment: good  Cognition: does  appear grossly intact; formal cognitive testing was not done  Gait and Station: N/A (telehealth)                   Mood and Anxiety History   mood or anxiety disorder: No issues. Describe sit as mostly stable and same as it's always been.   psychosis: Not applicable  Premenstrual mood/anxiety problems: None  Mood symptoms while taking hormonal birth control: Tried Nexaplon implant for five years and this didn't impact her mood.  Infertility: N/A  Traumatic birth: Not applicable  Family Hx of PMADs: Unknown               Psychotropic Medication Trials  {     Medication Max Dose (mg) Dates / Duration Helpful Taken during a  period DC Reason / Adverse Effects?   Olanzapine 2.5    felt it wasn't very effective, maybe wore off   Risperidone 1 BID    trouble taking twice daily   Abilify 5       Buspar   Y for anxiety                                                           Past Medical History     Neurologic Hx [head injury, seizures, etc]: Had a concussion in High school after hitting her head    Patient Active Problem List   Diagnosis    Supervision of normal  first pregnancy, antepartum    Bipolar affective disorder, currently active (H)    BMI 30.0-30.9,adult    At risk for complication of pregnancy    Low-lying placenta    Antepartum anemia                       OB History       # 1 - Date: None, Sex: None, Weight: None, GA: None, Type: None, Apgar1: None, Apgar5: None, Living: None, Birth Comments: None                  Allergies     Patient has no known allergies.                Medications     Current Outpatient Medications   Medication Sig Dispense Refill    ARIPiprazole (ABILIFY) 5 MG tablet Take 5 mg by mouth daily.      aspirin 81 MG EC tablet Take 1 tablet (81 mg) by mouth daily. 90 tablet 1    busPIRone (BUSPAR) 5 MG tablet  (Patient not taking: Reported on 1/15/2025)      Prenat MV-Min w/Fe-Folate-DHA (PRENATAL COMPLETE PO) Take 1 tablet by mouth daily.                     Data         12/11/2024     2:44 PM   PROMIS-10 Total Score w/o Sub Scores   PROMIS TOTAL - SUBSCORES 25        Patient-reported         12/11/2024     2:45 PM   CAGE-AID Total Score   Total Score 0    Total Score MyChart 0 (A total score of 2 or greater is considered clinically significant)       Patient-reported         12/11/2024     2:29 PM   PHQ-9 SCORE   PHQ-9 Total Score MyChart 2 (Minimal depression)   PHQ-9 Total Score 2        Patient-reported   Answers submitted by the patient for this visit:  Patient Health Questionnaire (Submitted on 1/22/2025)  If you checked off any problems, how difficult have these problems made it for you to do your work, take care of things at home, or get along with other people?: Somewhat difficult  PHQ9 TOTAL SCORE: 5         No data to display                Liver/Kidney Function, TSH Metabolic Blood counts   No lab results found.  No lab results found. No lab results found.  No lab results found.  No lab results found. Recent Labs   Lab Test 01/15/25  1026   WBC 11.6*   HGB 9.2*  9.2*   HCT 29.1*   MCV 87            {ECG N/A QTc = N/Ams    In  conclusion: The risks/benefits/alternatives of Abilify in lactation/pregnancy were discussed with Manuela Gruber in the past Reviewed that there are no absolutes as far as medication safety in pregnancy/lactation. General r/b/a of treatment and medications were also discussed. We also reviewed the risks of untreated  mood and anxiety disorders to both mother and baby/families. We reviewed SE of Abilify as well as general signs/symptoms in her breastfeeding child to monitor for ( lethargy, decreased suck, change in behavior). She understands she is to arrange assistance with childcare while taking medications that may cause sedation. Manuela Gruber has had her questions answered, expresses her understanding of the information provided, and appears to have the capacity to give informed consent regarding treatment options.  resources were provided to the patient as outlined in AVS.      PROVIDER: Tolulope Oluwadamilola Odebunmi, MD    Level of Medical Decision Making:   - At least 2 stable chronic problems  - Engaged in prescription drug management during visit (discussed any medication benefits, side effects, alternatives, etc.)       The longitudinal plan of care for the diagnosis(es)/condition(s) as documented were addressed during this visit. Due to the added complexity in care, I will continue to support Manuela in the subsequent management and with ongoing continuity of care.

## 2025-01-22 ENCOUNTER — VIRTUAL VISIT (OUTPATIENT)
Dept: PSYCHIATRY | Facility: CLINIC | Age: 30
End: 2025-01-22
Attending: STUDENT IN AN ORGANIZED HEALTH CARE EDUCATION/TRAINING PROGRAM
Payer: COMMERCIAL

## 2025-01-22 DIAGNOSIS — F39 MOOD DISORDER: Primary | ICD-10-CM

## 2025-01-22 DIAGNOSIS — F41.9 ANXIETY: ICD-10-CM

## 2025-01-22 LAB — GLUCOSE 1H P 50 G GLC PO SERPL-MCNC: 135 MG/DL (ref 70–129)

## 2025-01-22 PROCEDURE — G2211 COMPLEX E/M VISIT ADD ON: HCPCS | Mod: 95 | Performed by: STUDENT IN AN ORGANIZED HEALTH CARE EDUCATION/TRAINING PROGRAM

## 2025-01-22 PROCEDURE — 98006 SYNCH AUDIO-VIDEO EST MOD 30: CPT | Performed by: STUDENT IN AN ORGANIZED HEALTH CARE EDUCATION/TRAINING PROGRAM

## 2025-01-22 RX ORDER — ARIPIPRAZOLE 5 MG/1
5 TABLET ORAL DAILY
Qty: 30 TABLET | Refills: 1 | Status: SHIPPED | OUTPATIENT
Start: 2025-01-22

## 2025-01-22 ASSESSMENT — PAIN SCALES - GENERAL: PAINLEVEL_OUTOF10: NO PAIN (0)

## 2025-01-22 ASSESSMENT — PATIENT HEALTH QUESTIONNAIRE - PHQ9
SUM OF ALL RESPONSES TO PHQ QUESTIONS 1-9: 5
SUM OF ALL RESPONSES TO PHQ QUESTIONS 1-9: 5
10. IF YOU CHECKED OFF ANY PROBLEMS, HOW DIFFICULT HAVE THESE PROBLEMS MADE IT FOR YOU TO DO YOUR WORK, TAKE CARE OF THINGS AT HOME, OR GET ALONG WITH OTHER PEOPLE: SOMEWHAT DIFFICULT

## 2025-01-22 NOTE — NURSING NOTE
Current patient location: [facility] [address]  [facility] [id number]    Is the patient currently in the state of [place]? YES    Visit mode: VIDEO    If the visit is dropped, the patient can be reconnected by:VIDEO VISIT: Text to cell phone:   Telephone Information:   Mobile [phone]       Will anyone else be joining the visit? NO  (If patient encounters technical issues they should call [phone]    Are changes needed to the allergy or medication list? No    Are refills needed on medications prescribed by this physician? NO    Rooming Documentation:  Questionnaire(s) completed    Reason for visit: ERASTO ROSENF

## 2025-01-22 NOTE — PATIENT INSTRUCTIONS
"Treatment Plan    Medications:  - Continue Abilify 5mg daily  -Start Magnesium glycinate 200mg BID      **For crisis resources, please see the information at the end of this document**   Patient Education    Thank you for coming to the [facility].     To find additional local resources, refer to Postpartum Support International (PSI). Available at: http://www.postpartum.net/get-help/locations/united-states/    -[facility] Women's Mental Health at: www.womenentalhealth.org is a good resource for information about psychiatric medication in pregnancy/lactation    -Mother To Baby A service of the nonprofit Organization of Teratology Information Specialists (KOREY), provides evidence based information online and in printable handouts to provide patients and providers regarding medications and other exposures during pregnancy and lactation Available at: https://mothertobaby.org/fact-sheets-parent/.    -The 4th Trimester Project - Expert written resources and information for mothers and families. Available at: https://Slack/    -Consider using \"The Pregnancy and Postpartum Anxiety Workbook,\" by Shruthi Chowdhury PhD and Ankush Elizondo PsyD.    Postpartum Planning Tools:  Maternal Wellbeing Plan from [facility]: https://www.health.[facility].[place]/people/womeninfants/pmad/pmadsfs.html    4th Trimester Postpartum plan: https://GoWar.Kitchfix/mypostpartumplan    Tips for partners:  http://www.postpartum.net/family/tips-for-postpartum-dads-and-partners/    Lab Testing:  If you had lab testing today and your results are reassuring or normal they will be mailed to you or sent through [employer] within 7 days. If the lab tests need quick action we will call you with the results. The phone number we will call with results is # [phone]. If this is not the best number please call our clinic and change the number.     Medication Refills:  If you need any refills please call your pharmacy and they " will contact us. Our fax number for refills is [phone].   Three business days of notice are needed for general medication refill requests.   Five business days of notice are needed for controlled substance refill requests.   If you need to change to a different pharmacy, please contact the new pharmacy directly. The new pharmacy will help you get your medications transferred.     Contact Us:  Please call [phone] during business hours (8-5:00 M-F).   If you have medication related questions after clinic hours, or on the weekend, please call [phone].     Financial Assistance [phone]   Medical Records [phone]         MENTAL HEALTH CRISIS RESOURCES:  For a emergency help, please call 911 or go to the nearest Emergency Department.     Emergency Walk-In Options:   EmPATH Unit @ [facility] ([place]): [phone] - Specialized mental health emergency area designed to be calming  [facility] ([place]): [phone]  [facility] ([place]): [phone]  [facility]): [phone]    [facility] Crisis Information:   [place]: [phone]  [place]: [phone]  [place] ([place]) - Adult: [phone]     Child: [phone]  [place] - Adult: [phone]     Child: [phone]  [place]: [phone]  List of all [facility] resources:   https://[place].gov/dhs/people-we-serve/adults/health-care/mental-health/resources/crisis-contacts.jsp    National Crisis Information:   Crisis Text Line: Text  [place]  to 717377  Suicide & Crisis Lifeline: 988  National Suicide Prevention Lifeline: 0-782-749-TALK (1-716.932.3739)       For online chat options, visit https://suicidepreventionlifeline.org/chat/  Poison Control Center: 1-294.560.2623  Trans Lifeline: 1-681.678.7891 - Hotline for transgender people of all ages  The Evelio Project: 1-905.260.9841 - Hotline for LGBT youth     For Non-Emergency Support:   Fast Tracker: Mental Health & Substance Use  Disorder Resources -   https://www.fasttrackermn.org/

## 2025-01-25 ENCOUNTER — LAB (OUTPATIENT)
Dept: LAB | Facility: CLINIC | Age: 30
End: 2025-01-25
Payer: COMMERCIAL

## 2025-01-25 DIAGNOSIS — R73.09 ALTERED GLUCOSE METABOLISM: ICD-10-CM

## 2025-01-25 LAB
GESTATIONAL GTT 1 HR POST DOSE: 189 MG/DL (ref 60–179)
GESTATIONAL GTT 2 HR POST DOSE: 174 MG/DL (ref 60–154)
GESTATIONAL GTT 3 HR POST DOSE: 82 MG/DL (ref 60–139)
GLUCOSE P FAST SERPL-MCNC: 86 MG/DL (ref 60–94)

## 2025-01-25 PROCEDURE — 36415 COLL VENOUS BLD VENIPUNCTURE: CPT

## 2025-01-25 PROCEDURE — 82951 GLUCOSE TOLERANCE TEST (GTT): CPT

## 2025-01-25 PROCEDURE — 82952 GTT-ADDED SAMPLES: CPT

## 2025-01-28 ENCOUNTER — MYC MEDICAL ADVICE (OUTPATIENT)
Dept: PSYCHIATRY | Facility: CLINIC | Age: 30
End: 2025-01-28

## 2025-01-28 ENCOUNTER — TELEPHONE (OUTPATIENT)
Dept: MIDWIFE SERVICES | Facility: CLINIC | Age: 30
End: 2025-01-28
Payer: COMMERCIAL

## 2025-01-28 DIAGNOSIS — O24.410 DIET CONTROLLED GESTATIONAL DIABETES MELLITUS (GDM) IN THIRD TRIMESTER: Primary | ICD-10-CM

## 2025-01-29 NOTE — TELEPHONE ENCOUNTER
Telephone to patient regarding abnormal glucose tolerance test and diagnosis of gestational diabetes (GDMA1). Questions answered about gestational diabetes. Patient wondering if she can still see the CNM's for the remainder of her pregnancy, discussed that as long as she is not put on insulin, she can remain with [facility] midwives, patient states understanding. Patient agreeable to seeing diabetic educator ASAP. Order placed.     CHACHA Burns, KARIE  [facility] Nurse-Midwives [facility]

## 2025-01-30 ENCOUNTER — VIRTUAL VISIT (OUTPATIENT)
Dept: EDUCATION SERVICES | Facility: CLINIC | Age: 30
End: 2025-01-30
Attending: MIDWIFE
Payer: COMMERCIAL

## 2025-01-30 ENCOUNTER — TELEPHONE (OUTPATIENT)
Dept: MIDWIFE SERVICES | Facility: CLINIC | Age: 30
End: 2025-01-30

## 2025-01-30 DIAGNOSIS — O24.410 DIET CONTROLLED GESTATIONAL DIABETES MELLITUS (GDM) IN THIRD TRIMESTER: ICD-10-CM

## 2025-01-30 RX ORDER — LANCETS
EACH MISCELLANEOUS
Qty: 100 EACH | Refills: 6 | Status: SHIPPED | OUTPATIENT
Start: 2025-01-30 | End: 2025-01-30

## 2025-01-30 RX ORDER — LANCETS
EACH MISCELLANEOUS
Qty: 100 EACH | Refills: 6 | Status: SHIPPED | OUTPATIENT
Start: 2025-01-30

## 2025-01-30 NOTE — PROGRESS NOTES
Diabetes Self-Management Education & Support    Type of service:  Video Visit    If the video visit is dropped, the video visit invitation should be resent by: Send to e-mail at: [email or web address]    Originating Location (pt. Location): Home  Distant Location (provider location): [facility]  Mode of Communication:  Video Conference via fanatix    Video Start Time:  9:00 AM  Video End Time (time video stopped): 9:48 AM    How would patient like to obtain AVS? MyChart    Assessment  Met with Manuela via video today for her initial education for Gestational Diabetes.  This is her first pregnancy.      Intervention  Patient was instructed on generic meter.    Educational topics covered today:  GDM diagnosis, pathophysiology, risks and complications of GDM, means of controlling GDM, using a blood glucose monitor, blood glucose goals, logging and interpreting glucose results, ketone testing, when to call a diabetes educator or OB provider, healthy eating during pregnancy, counting carbohydrates, meal planning for GDM, and physical activity    Educational materials provided today:   [place] Understanding Gestational Diabetes  GDM Log Book  Sharps Disposal  Care After Delivery  generic meter kit    Plan  Check glucose 4 times daily, before breakfast and 1 hour after each meal.     Check Ketones daily for one week, if negative, reduce testing to once a week.     Physical activity recommended: yes.    Meal plan: 30-45g carbohydrates at breakfast, 45-60g carbohydrates at lunch, 45-60g carbohydrates at supper, 15-30g carbohydrates at 3 snacks a day.  Follow consistent carbohydrate meal plan, eat carbohydrates and protein/fat at all meals/snacks.    Call/MyChart message diabetes educator if 3 or more blood sugars are above the goal in 1 week, if ketones are positive, or with questions/concerns.    Follow-up:    Follow up on Upcoming Diabetes Ed Appointments     Visit Type Date Time  Department    [facility]OM GDM GROUP 1/30/2025  9:00 AM [facility] DIABETES EDUCATION    GDM FOLLOW UP 2/14/2025 11:00 AM [facility] DIABETES EDUCATION        Subjective/Objective  Manuela is an 29 year old year old, presenting for the following diabetes education related to:           Cultural Influences/Ethnic Background:  Not  or       Estimated Date of Delivery: Mar 14, 2025    1 hour OGTT  Lab Results   Component Value Date    GLU1 135 (H) 01/15/2025         3 hour OGTT    Fasting  Lab Results   Component Value Date    GTTGF 86 01/25/2025       1 hour  Lab Results   Component Value Date    GTTG1 189 (H) 01/25/2025       2 hour  Lab Results   Component Value Date    GTTG2 174 (H) 01/25/2025       3 hour  Lab Results   Component Value Date    GTTG3 82 01/25/2025       Healthy Eating:       Healthy Coping:       Current Management:       Eleni Croft RN  Time Spent: 48 minutes  Encounter Type: Individual     Any diabetes medication dose changes were made via the CDCES Standing Orders under the patient's referring provider.

## 2025-01-30 NOTE — LETTER
1/30/2025         RE: Manuela Gruber  [address]  [facility] [address]        Dear Colleague,    Thank you for referring your patient, Manuela Gruber, to the [facility]. Please see a copy of my visit note below.    Diabetes Self-Management Education & Support    Type of service:  Video Visit    If the video visit is dropped, the video visit invitation should be resent by: Send to e-mail at: [email or web address]    Originating Location (pt. Location): Home  Distant Location (provider location): [facility]  Mode of Communication:  Video Conference via [employer]    Video Start Time:  9:00 AM  Video End Time (time video stopped): 9:48 AM    How would patient like to obtain AVS? Lawrence Gil  Met with Manuela via video today for her initial education for Gestational Diabetes.  This is her first pregnancy.      Intervention  Patient was instructed on generic meter.    Educational topics covered today:  GDM diagnosis, pathophysiology, risks and complications of GDM, means of controlling GDM, using a blood glucose monitor, blood glucose goals, logging and interpreting glucose results, ketone testing, when to call a diabetes educator or OB provider, healthy eating during pregnancy, counting carbohydrates, meal planning for GDM, and physical activity    Educational materials provided today:   [place] Understanding Gestational Diabetes  GDM Log Book  Sharps Disposal  Care After Delivery  generic meter kit    Plan  Check glucose 4 times daily, before breakfast and 1 hour after each meal.     Check Ketones daily for one week, if negative, reduce testing to once a week.     Physical activity recommended: yes.    Meal plan: 30-45g carbohydrates at breakfast, 45-60g carbohydrates at lunch, 45-60g carbohydrates at supper, 15-30g carbohydrates at 3 snacks a day.  Follow consistent carbohydrate meal plan, eat carbohydrates and protein/fat  at all meals/snacks.    Call/MyChart message diabetes educator if 3 or more blood sugars are above the goal in 1 week, if ketones are positive, or with questions/concerns.    Follow-up:    Follow up on Upcoming Diabetes Ed Appointments     Visit Type Date Time Department    [facility]OM GDM GROUP 1/30/2025  9:00 AM [facility] DIABETES EDUCATION    GDM FOLLOW UP 2/14/2025 11:00 AM [facility] DIABETES EDUCATION        Subjective/Objective  Manuela is an 29 year old year old, presenting for the following diabetes education related to:           Cultural Influences/Ethnic Background:  Not  or       Estimated Date of Delivery: Mar 14, 2025    1 hour OGTT  Lab Results   Component Value Date    GLU1 135 (H) 01/15/2025         3 hour OGTT    Fasting  Lab Results   Component Value Date    GTTGF 86 01/25/2025       1 hour  Lab Results   Component Value Date    GTTG1 189 (H) 01/25/2025       2 hour  Lab Results   Component Value Date    GTTG2 174 (H) 01/25/2025       3 hour  Lab Results   Component Value Date    GTTG3 82 01/25/2025       Healthy Eating:       Healthy Coping:       Current Management:       Eleni Croft RN  Time Spent: 48 minutes  Encounter Type: Individual     Any diabetes medication dose changes were made via the CDCES Standing Orders under the patient's referring provider.

## 2025-01-30 NOTE — TELEPHONE ENCOUNTER
Incoming faxed request for Rx clarification received from [facility] in [place], [place].  Medication needing clarification:     Disp Refills Start End ODILIA   thin (NO BRAND SPECIFIED) lancets 100 each 6 1/30/2025 -- No   Sig: Use with lanceting device. To accompany: Blood Glucose Monitor Brands: per insurance.     Pharmacy requesting sig/frequency for use.    Message routed to on-call CNM for new Rx.

## 2025-01-31 ENCOUNTER — HOSPITAL ENCOUNTER (OUTPATIENT)
Facility: HOSPITAL | Age: 30
Discharge: HOME | End: 2025-01-31
Attending: ADVANCED PRACTICE MIDWIFE | Admitting: ADVANCED PRACTICE MIDWIFE
Payer: COMMERCIAL

## 2025-01-31 ENCOUNTER — APPOINTMENT (OUTPATIENT)
Dept: ULTRASOUND IMAGING | Facility: HOSPITAL | Age: 30
End: 2025-01-31
Attending: MIDWIFE
Payer: COMMERCIAL

## 2025-01-31 VITALS
HEART RATE: 104 BPM | WEIGHT: 168 LBS | HEIGHT: 59 IN | RESPIRATION RATE: 18 BRPM | BODY MASS INDEX: 33.87 KG/M2 | DIASTOLIC BLOOD PRESSURE: 74 MMHG | TEMPERATURE: 99.1 F | SYSTOLIC BLOOD PRESSURE: 126 MMHG

## 2025-01-31 DIAGNOSIS — Z91.89 AT RISK FOR COMPLICATION OF PREGNANCY: ICD-10-CM

## 2025-01-31 DIAGNOSIS — Z34.00 SUPERVISION OF NORMAL FIRST PREGNANCY, ANTEPARTUM: Primary | ICD-10-CM

## 2025-01-31 PROBLEM — Z36.89 ENCOUNTER FOR TRIAGE IN PREGNANT PATIENT: Status: ACTIVE | Noted: 2025-01-31

## 2025-01-31 PROCEDURE — 76819 FETAL BIOPHYS PROFIL W/O NST: CPT

## 2025-01-31 PROCEDURE — 59025 FETAL NON-STRESS TEST: CPT | Mod: 26 | Performed by: MIDWIFE

## 2025-01-31 PROCEDURE — G0463 HOSPITAL OUTPT CLINIC VISIT: HCPCS

## 2025-01-31 PROCEDURE — 99213 OFFICE O/P EST LOW 20 MIN: CPT | Mod: 25 | Performed by: MIDWIFE

## 2025-01-31 RX ORDER — MULTIVIT WITH MINERALS/LUTEIN
250 TABLET ORAL EVERY OTHER DAY
COMMUNITY

## 2025-01-31 RX ORDER — LIDOCAINE 40 MG/G
CREAM TOPICAL
Status: DISCONTINUED | OUTPATIENT
Start: 2025-01-31 | End: 2025-01-31 | Stop reason: HOSPADM

## 2025-01-31 RX ORDER — UREA 10 %
45 LOTION (ML) TOPICAL EVERY OTHER DAY
COMMUNITY

## 2025-01-31 ASSESSMENT — ACTIVITIES OF DAILY LIVING (ADL)
ADLS_ACUITY_SCORE: 41
ADLS_ACUITY_SCORE: 15

## 2025-01-31 NOTE — PROGRESS NOTES
Outpatient/Triage Note:    Patient Name:  Manuela Gruber  :      1995  MRN:      [id number]    Assessment:   @ 34w0d. Here for decreased fetal movement.    Plan:   -NST reactive  -BPP 8 out of 8 with normal AUGUSTUS and  SDP all within normal limits  - Discharge to home undelivered. Reviewed warning signs including decreased fetal movement, leaking of fluid, vaginal bleeding, or signs of  labor. Reviewed how to contact on-call CNM. Follow-up in clinic Monday with CNM as scheduled or sooner as needed. All questions answered. Agrees with plan.     Subjective  Manuela Gruber is a 29 year old  who presented to [facility] for evaluation of decreased fetal movement.  (See phone note from earlier today)  Denies leaking of fluid, bleeding.    Objective  Reactive NST, biophysical profile: 8/8 normal SDP and AUGUSTUS       Physical Exam:      General appearance: Pleasant, well-groomed  Psych: AAO x3  Skin: Pink, warm & dry  HEENT: unremarkable  Cardiovascular: Well-perfused  Respiratory: Unlabored breathing  Abdomen: soft, non-tender, gravid           Fetal Heart Rate:    Rate:Baseline Classification: Category 1  Variability: Moderate variability present  Pattern: Accelerations present, decelerations absent       Uterine Activity: (per RN documentation)  Mode:    Contraction frequency:    Contraction duration:    Contraction quality:          Cervical Exam: deferred               Total time spent on the date of this encounter doing: chart review, review of test results, patient visit, the physical exam & procedure, education, counseling, developing this plan of care, and documenting = 25 minutes    Provider: Tete BURNHAM CNM    Date:  2025  Time:  2:16 PM

## 2025-01-31 NOTE — PROGRESS NOTES
Patient presents to [facility] for decreased fetal movement.   NST reactive.   BPP 8/8.   Orders to discharge to home per ARTIE Mahan CNM.     Britt Benjamin RN

## 2025-02-03 ENCOUNTER — PRENATAL OFFICE VISIT (OUTPATIENT)
Dept: MIDWIFE SERVICES | Facility: CLINIC | Age: 30
End: 2025-02-03
Payer: COMMERCIAL

## 2025-02-03 ENCOUNTER — TRANSCRIBE ORDERS (OUTPATIENT)
Dept: MATERNAL FETAL MEDICINE | Facility: HOSPITAL | Age: 30
End: 2025-02-03

## 2025-02-03 VITALS
SYSTOLIC BLOOD PRESSURE: 115 MMHG | WEIGHT: 177.5 LBS | HEIGHT: 59 IN | HEART RATE: 106 BPM | DIASTOLIC BLOOD PRESSURE: 70 MMHG | OXYGEN SATURATION: 99 % | BODY MASS INDEX: 35.78 KG/M2

## 2025-02-03 DIAGNOSIS — Z34.00 SUPERVISION OF NORMAL FIRST PREGNANCY, ANTEPARTUM: Primary | ICD-10-CM

## 2025-02-03 DIAGNOSIS — O99.019 ANTEPARTUM ANEMIA: ICD-10-CM

## 2025-02-03 DIAGNOSIS — O26.90 PREGNANCY RELATED CONDITION: Primary | ICD-10-CM

## 2025-02-03 DIAGNOSIS — O24.410 DIET CONTROLLED GESTATIONAL DIABETES MELLITUS (GDM) IN THIRD TRIMESTER: ICD-10-CM

## 2025-02-03 DIAGNOSIS — Z91.89 AT RISK FOR COMPLICATION OF PREGNANCY: ICD-10-CM

## 2025-02-03 PROCEDURE — 99207 PR PRENATAL VISIT: CPT | Performed by: ADVANCED PRACTICE MIDWIFE

## 2025-02-03 RX ORDER — HEPARIN SODIUM (PORCINE) LOCK FLUSH IV SOLN 100 UNIT/ML 100 UNIT/ML
5 SOLUTION INTRAVENOUS
OUTPATIENT
Start: 2025-02-04

## 2025-02-03 RX ORDER — DIPHENHYDRAMINE HYDROCHLORIDE 50 MG/ML
25 INJECTION INTRAMUSCULAR; INTRAVENOUS
Start: 2025-02-04

## 2025-02-03 RX ORDER — ALBUTEROL SULFATE 0.83 MG/ML
2.5 SOLUTION RESPIRATORY (INHALATION)
OUTPATIENT
Start: 2025-02-04

## 2025-02-03 RX ORDER — DIPHENHYDRAMINE HYDROCHLORIDE 50 MG/ML
50 INJECTION INTRAMUSCULAR; INTRAVENOUS
Start: 2025-02-04

## 2025-02-03 RX ORDER — HEPARIN SODIUM,PORCINE 10 UNIT/ML
5-20 VIAL (ML) INTRAVENOUS DAILY PRN
OUTPATIENT
Start: 2025-02-04

## 2025-02-03 RX ORDER — ALBUTEROL SULFATE 90 UG/1
1-2 INHALANT RESPIRATORY (INHALATION)
Start: 2025-02-04

## 2025-02-03 RX ORDER — MEPERIDINE HYDROCHLORIDE 25 MG/ML
25 INJECTION INTRAMUSCULAR; INTRAVENOUS; SUBCUTANEOUS
OUTPATIENT
Start: 2025-02-04

## 2025-02-03 RX ORDER — METHYLPREDNISOLONE SODIUM SUCCINATE 40 MG/ML
40 INJECTION INTRAMUSCULAR; INTRAVENOUS
Start: 2025-02-04

## 2025-02-03 RX ORDER — EPINEPHRINE 1 MG/ML
0.3 INJECTION, SOLUTION, CONCENTRATE INTRAVENOUS EVERY 5 MIN PRN
OUTPATIENT
Start: 2025-02-04

## 2025-02-03 NOTE — PROGRESS NOTES
Manuela is here with Junito for a routine prenatal visit.Reports normal fetal movements. Denies regular painful contractions, bleeding, leaking, changes in fetal movement.   Questions about RSV vaccine: reviewed routine guidance of administration between 32-36w only between September-January given elevated RSV risk during peak season. As is outside of recommended administration window, reviewed do not recommend administration, accepting of.    Glucose review indicates checking and recording incomplete. One of nine recording BGs elevated. Interval weight gain 12lbs, more than expected. S=D. Reviewed typical meal components and encouraged increased protein intake with routine meal times. Variable work start time identified as barrier to consistent breakfast meal time and components.  Next diabetes education visit scheduled for 25. Requested diabetes education team connect for sooner visit. Q4w growth ultrasounds ordered, reviewed.     EPDS today: 0, 0 to #10.  Handouts given regarding GBS, breast-feeding and postpartum depression/anxiety and wellbeing. Manuela does/does not have a history of depression, anxiety or other mental health condition. History of Bipolar and connected with psychiatry since last prenatal visit.     Reviewed counseling  labor precautions, warning signs and symptoms, and when/how to contact the on-call CNM.     NV: GBS and repeat CBC. Consider IV Fe if PO Fe not changed. Also discussed cervical exam or bedside limited ultrasound to confirm fetal presentation.   RTC 2 wks.      CHACHA Carbajal CNM on 2/3/2025 at 5:08 PM

## 2025-02-03 NOTE — PATIENT INSTRUCTIONS
"\"We hope you had a positive experience and that you can definitely recommend [facility] to your family and friends. You ll be receiving a survey soon and we look forward to hearing your feedback\".    [facility]  - Contact information:  Appointment line and to get a hold of CNM in clinic Monday-Friday 8 am - 5 pm:  [phone].  There are some clinics with early start times (1st appointment 7:40 am) and others with evening hours (last appointment 6:20 pm).  Most are typically open from 8 am to 5 pm.    CNM on call answering service: [phone].  Specify your hospital of choice and leave a brief message for CNM;  will then page CNM who is on call at your specified hospital and you should receive a call back with 15 minutes.  Be sure that your ringer is audible and that you can accept blocked calls so that we can get back in touch with you! This number should be reserved for urgent needs if during the day, before 8 am, after 5 pm, weekends, holidays.    Contact the on-call CNM with warning signs, such as:  vaginal bleeding   Vaginal discharge and itching or pain and burning during urination  Leg/calf pain or swelling on one side  severe abdominal pain  nausea and vomiting more than 4-5 times a day, or if you are unable to keep anything down  fever more than 100.4 degrees F.     [employer]  After each of your visits you are welcome to check Roamer for your visit summary including education and links to information relevant to your pregnancy and/or well woman care.   Find the \"Visits\" tab at the top of the page, you will see a list of recent visits and for each visit a for link for \"View After Visit Summary.\" View of your After Visit Summary will allow you to read our recommendations from your visit, review any education provided, and link to websites with useful information.   If you have any questions or difficulty navigating [facility], please feel free to " "contact us and we will do our best to direct you.  Meet the Midwives from [facility]  You are invited to an informational meet and greet with [facility]ified Nurse-Midwives. Our free \"Meet the Midwives\" event is a great opportunity to learn about our midwives' philosophy and experience, the hospitals where we can assist with your birth, and answer questions you may have. Partners, friends, and family are welcome to attend. Currently, this is a virtual event.  Date  Last Thursday of every month at 7 pm.    Link to next (live) meeting  [email or web address]-[email or web address]  To Join by Telephone (audio only) Call:   [phone] Phone Conference ID: [phone] #      Touring the Maternity Care Center  At this time we are offering a virtual tour of the Maternity Care Centers at both [facility]:   [facility] Maternity Care:   https://[facility].Showbie/locations/the-birthplace-at--[facility] Maternity Care:   [email or web address]://[facility]ers.Showbie/locations/[facility]    Scroll to the bottom of the page in this link if the above link does not work.      Breastfeeding and Birth Control  How do I decide what birth control method is best for me while I am breastfeeding?  Choosing a method of birth control is very personal. First, answer the following questions:     Do you want to have more children?   How much spacing between births do you want for your children?   Do you smoke or have you had any health problems, such as liver disease or a blood clot?  Talk about the answers to each of these questions with your health care provider to help you choose the best method for you.    Can I use breastfeeding as my birth control?  Using breastfeeding as your birth control (the lactational amenorrhea method) can be a good way to keep from getting pregnant in the first " months after the baby is born. Each time your baby nurses, your body releases a hormone called prolactin, which stops your body from making the hormones that cause you to ovulate (release an egg). If you are not ovulating, you cannot get pregnant.    The lactational amenorrhea method works only if:   you have not started your period yet.   you are breastfeeding only and not giving your baby any other food or drink.   you are breastfeeding at least every 4 hours during the day and every 6 hours at night.   your baby is less than 6 months old.  When any 1 of these 4 things is not happening, you no longer have good protection from getting pregnant, and you should use another form of birth control.    What birth control methods are safe for me to use while I breastfeed?    Methods without hormones  Methods without hormones do not affect you, your baby, or your breastfeeding.  Methods without hormones that are the most effective   The copper intrauterine contraceptive device (IUD) (ParaGard) is a small, T-shaped device that is in- serted into your uterus (womb) through the vagina and cervix. The copper IUD lasts for 10 years.   Sterilization (getting your tubes tied or your partner having a vasectomy) is very effective, but it is per- manent. You should choose sterilization only if you do not want to have more children.  A method without hormones that is effective   The lactational amenorrhea method described above is effective for the first 6 months.  Methods without hormones that are less effective   Natural family planning is monitoring your body for signs of ovulation and not having sex when you think you are ovulating. This method is reliable only if you are having regular periods every month.   Barrier methods (condoms, diaphragms, sponges, and spermicides) are used at the time you have sex. These methods are effective only if you use them correctly every time.    Methods with hormones  Birth control methods that  use hormones can be used while you are breastfeeding. They may have a small effect on lowering the amount of milk you make. All hormones will get into your breast milk in very small amounts, but there is no known harm to your baby from this small amount of hormone in breast milk.only methodsmethods use only 1 hormone, called progestin. You can start them right after your baby is born or wait 4 to 6 weeks to make sure your milk supply is good.   Progestin-only pills ( minipills ): If you like to take pills every day, you can use the minipill. In order forpill to work well, you have to take 1 at the same time each day. When you stop breastfeeding, you should start pills that have both estrogen and progestin because they are better at keeping you from get- ting pregnant.   Progestin IUD (Mirena): The progestin IUD is shaped and inserted into the uterus like the copper IUD. It works for up to 5 years. Both IUDs are usually inserted 4 to 6 weeks after the baby is born.  Progestin implant (Nexplanon): The progestin implant is a small matchstick-sized flexible shruti. It is placed into the fatty tissue in the back of your arm. It works for up to 3 years.  Progestin shot (Depo-Provera): The progestin shot is given every 3 months.    estrogen and progestin methods  These methods use 2 hormones, called estrogen and progestin.     These methods increase your risk of a blood clot, which is already higher than normal after you have a baby. You should not use them until your baby is at least 6 weeks old. The combined methods are not recommended as the first choice for women who are breastfeeding. If a combined method is the one that you feel will be best for you to prevent getting pregnant, these methods are okay to use while breastfeeding.   Combined birth control pills: You take a pill each day.  Vaginal ring (NuvaRing): The ring is worn in the vagina for 3 weeks then left out for 1 week before youin a new ring.  Patch (Ortho  Lili): The patch is placed on your skin and changed every week for 3 weeks then left off forweek before putting a new patch on a different area of your skin.    Pediatric Care Providers at [facility]:    Choosing the right provider is one of the most important decisions you ll make about your health care. We can help you find the right one. Remember, you re looking for a provider you can trust and work with to improve your health and well-being, so take time to think about what you need. Depending on how complicated your health care needs are, you may need to see more than one type of provider.    Primary Care Providers: You ll see a primary care provider first for most health issues. They ll work with you to get your recommended screenings, help you manage chronic conditions, and refer you to other types of providers if you need them. Your primary care provider may be called a family physician or doctor, internist, general practitioner, nurse practitioner, or physician s assistant. Your child or teenager s provider may be called a pediatrician.  Specialists: You ll see a specialist for certain services or to treat specific conditions. Specialists include cardiologists, oncologists, psychologists, allergists, podiatrists, and orthopedists. You may need a referral from your primary care provider before you go to a specialist in order for your health plan to pay for your visit.\[place] are some tips for finding a provider where you live:  If you already have a provider you like and want to keep working with, call their office and ask if they accept your coverage.  Call your insurance company or state Medicaid and CHIP program. Look at their website or check your member handbook to find providers in your network who take your health coverage.  Ask your friends or family if they have providers they like and use these tools to compare health care providers in your area.    Family Medicine at  [facility]:      https://www.[email or web address]/specialties/family-medicine    Many of our families enjoy all seeing the same doctor, who comes to know the whole family very well. We base our practice on the knowledge of the patient in the context of family and community.  WHY CHOOSE A FAMILY MEDICINE PHYSICIAN?  Ability of the whole family to see the same doctor  Focus on the whole person, including physical and emotional health  A personal relationship with their doctor that is nurtured over time  Respect for individual and family beliefs and values  No need to change primary care providers when a certain age is reached  Coordination of care when other health care services are needed    Pediatrics at  [facility]:   https://www.[email or web address]/specialties/pediatric-care    Through a teaching affiliation with the [facility], [facility] staff keeps current on new developments in the field of pediatrics.     Everything we do centers around caring for children. We place special emphasis on wellness and prevention.pediatric care team includes a team of pediatricians and certified nurse practitioners who provide care to pediatric and adolescent patients ages 0 to 18, and some up to the age of 26. We offer preventive health maintenance for healthy children as well the diagnosis and treatment of common and chronic illnesses and injuries. In addition, we also offer several pediatric specialists who focus on adolescent health issues and developmental and behavioral issues.    Circumcision    Educational video:     [email or web address]    For More Information  American Academy of Family Physicians: Circumcision  http://familydoctor.org/familydoctor/en/pregnancy-newborns/caring-for-newborns/infant- care/circumcision.html  MedlinePlus: Circumcision (includes a slide show on the procedure)  www.nlm.nih.gov/medlineplus/circumcision.html  American Academy of Pediatrics:  Policy statement on circumcision  Http://pediatrics.aappublications.org/content/130/3/e756.abstract      Childbirth and Parenting Education:     Everyday Miracles:   https://www.everyday-miracles.org/    Free Video Series from [facility]: https://nursing.[facility]/academics/specialty-areas/nurse-midwifery/having-baby-prenatal-videos/having-baby-prenatal-and    Childbirth Education virtual (live) classes: www.Krugle/classes  The Birth Hour: https://Atraverda/online-childbirth-class/  BirthED: https://www.birthedmn.com/  GABE parenting center: http://Rentabilities/   (046) 585-CJSE  Blooma: (education, yoga & wellness) www.OVIA  Enlightened Mama: www.enlightenedmama.[employer]   Childbirth collective: (Parent topic nights)  www.childbirthcollective.org/  Hypnobabies:  www.hypnobabiestBATS.[employer]/  Hypnobirthing:  Http://Compendium.[employer]/  Hypnobirthing virtual class: www.StellaService/hypnobirthing    Information about doulas:  Childbirth collective: http://www.childbirthcollective.org/  Doulas of [place] (BAILEY):  www.bailey.org  [place]  project: http://twincitiesdoulaproject.com/     Early Childhood and Family Education (ECFE):  ECFE offers parents hands-on learning experiences that will nourish a lifetime of teachable moments.  http://ecfe.info/ecfe-home/    APPS and Podcasts:   Inés Cormier Nurture    Evidence Based Birth  The Birth Hour (for birth stories)   Birthful   Expectful   The Longest Shortest Time  PregnancyPodcsunil Cruz  https://www.downtobirthshow.com/    Book Recommendations:   Sheila Rehoboth's Birthing From Within--first few chapters include a new-age tone, you may prefer to skip it and keep going, because there is good stuff later.  This book recommendation covers emotional preparation, but does cover coping with pain, and use of both pharmacological and nonpharmacological methods.    Guide to Childbirth by Radha May  "Colleen  Childbirth Without Fear by Mary Fernandez Read    Dr. Kay' The Pregnancy Book and The Birth Book--the pregnancy book goes month-by month    The Birth Partner by Rain Hernandez    Womanly Art of Breastfeeding by La Leche League International   Bestfeeding by Halina Patel--great pictures    Mothering Your Nursing Toddler, by Gemma Noel.   Addresses dealing with so many of the challenging behaviors of a nursing toddler.  How Weaning Happens, by La Leche League.  Discusses weaning at all ages, from medically necessary weaning of an infant, all the way up to age 5 (or older), with why/why not, and strategies.  Very empowering book both for deciding to wean and deciding not to.    American College of Nurse-Midwives (ACNM) http://www.midwife.org/; look at the informational handouts at http://www.midwife.org/Share-With-Women     www.mymidwife.org    Mother to Baby (Medication and Herbal guidance in pregnancy): http://www.mothertobaby.org  Toll-Free Hotline: 288.266.6346  LactMed (Medication use while breastfeeding): http://toxnet.nlm.nih.gov/newtoxnet/lactmed.htm    Women's Health.gov:  http://www.womenshealth.gov/a-z-topics/index.html    American pregnancy association - http://americanpregnancy.org    Centering Pregnancy (group prenatal care option): http://centeringhealthcare.org    March of Dimes www.Accurence.com     FDA - Nutrition  www.mypyramid.gov  Under \"For Consumers,\" click on \"pregnant and breastfeeding women.\"      Centers for Disease Control and Prevention (CDC) - Vaccines : http://www.cdc.gov/vaccines/       When researching information on the web, question the validity of websites.  The domains .gov, .edu and.org tend to be more reliable information.  If there are a lot of advertisements, be cautious of the information provided. Stay away from blogs and chat rooms please!     Virtual Breastfeeding Support:    During this time of isolation, breastfeeding families need even more community! " " Here are some area organizations offering virtual support groups for breastfeeding:    [employer] Support Group,  at 10:30 am   Run by HILL Mcgee of [employer]   Go to [employer] Facebook page and click on \"events\" for link   https://www.facebook.com/events/694514178781736/  [facility] Milk Hour,  at 2:30 pm    Run by HILL Stover   Go to [facility] FB page and send message to get link   https://www.IZP Technologies.com/[facility] Medical Centerundations/  [facility]/[place] holding virtual meetings the first Tuesday of each month, 8-9 pm, and the   Third Saturday, 10 - 11 am.  Go to [facility] and [place] FB page; message to get link https://www.[employer]P Technologies.trivago/LLLofGoldNegar/?hc_location=[facility] offers a Lactation Lounge every Friday 12pm - 1pm, run by Yomaira Alonzo [facility] Leader   Sign up via link at https://www.V[employer]us Industries/cbe-lactation  [facility] is offering virtual support groups every Monday, 10:30 am - 12 pm, run by nurse IBCLC   Https://www.facebook.com/events/632150014497181/    Prenatal Breastfeeding Classes:      [facility] is offering virtual breastfeeding and  care classes:  https://www.[employer]/education-workshops  BirthEd childbirth and breastfeeding education offering virtual prenatal breastfeeding classes  https://www.birthedmn.com/workshops    Breastfeeding clinic visits are at [facility] on , [facility] on  and [facility] on . Call to schedule, [phone].    New Parent Connection:   [place], [address] [address] [facility]  In-person meetings on  from 6 pm - 7:15 pm for parents of  to 9 months, at the same site.   All are free, drop-in, no registration required.    There are also free virtual meetings ongoing on " Tuesdays:  11:30 am - 12:30 pm for parents of newborns to 3 months  4:15 pm to 5:15 pm for parents of 3 to 9-month olds  For joining info parents should call Flori Vu at [phone]       Learn about your options regarding induction of labor:  https://www.inductiondecisionaid.org/

## 2025-02-04 ENCOUNTER — PRE VISIT (OUTPATIENT)
Dept: MATERNAL FETAL MEDICINE | Facility: HOSPITAL | Age: 30
End: 2025-02-04
Payer: COMMERCIAL

## 2025-02-04 ENCOUNTER — MYC MEDICAL ADVICE (OUTPATIENT)
Dept: EDUCATION SERVICES | Facility: CLINIC | Age: 30
End: 2025-02-04
Payer: COMMERCIAL

## 2025-02-04 DIAGNOSIS — O99.019 ANTEPARTUM ANEMIA: ICD-10-CM

## 2025-02-04 DIAGNOSIS — Z91.89 AT RISK FOR COMPLICATION OF PREGNANCY: ICD-10-CM

## 2025-02-04 DIAGNOSIS — Z34.00 SUPERVISION OF NORMAL FIRST PREGNANCY, ANTEPARTUM: Primary | ICD-10-CM

## 2025-02-05 ENCOUNTER — OFFICE VISIT (OUTPATIENT)
Dept: MATERNAL FETAL MEDICINE | Facility: HOSPITAL | Age: 30
End: 2025-02-05
Attending: STUDENT IN AN ORGANIZED HEALTH CARE EDUCATION/TRAINING PROGRAM
Payer: COMMERCIAL

## 2025-02-05 ENCOUNTER — ANCILLARY PROCEDURE (OUTPATIENT)
Dept: ULTRASOUND IMAGING | Facility: HOSPITAL | Age: 30
End: 2025-02-05
Attending: STUDENT IN AN ORGANIZED HEALTH CARE EDUCATION/TRAINING PROGRAM
Payer: COMMERCIAL

## 2025-02-05 ENCOUNTER — VIRTUAL VISIT (OUTPATIENT)
Dept: EDUCATION SERVICES | Facility: CLINIC | Age: 30
End: 2025-02-05
Payer: COMMERCIAL

## 2025-02-05 ENCOUNTER — MYC MEDICAL ADVICE (OUTPATIENT)
Dept: MIDWIFE SERVICES | Facility: CLINIC | Age: 30
End: 2025-02-05

## 2025-02-05 DIAGNOSIS — O24.410 DIET CONTROLLED GESTATIONAL DIABETES MELLITUS (GDM) IN THIRD TRIMESTER: Primary | ICD-10-CM

## 2025-02-05 DIAGNOSIS — Z34.00 SUPERVISION OF NORMAL FIRST PREGNANCY, ANTEPARTUM: Primary | ICD-10-CM

## 2025-02-05 DIAGNOSIS — Z91.89 AT RISK FOR COMPLICATION OF PREGNANCY: ICD-10-CM

## 2025-02-05 DIAGNOSIS — O35.8XX0 UMBILICAL VEIN ABNORMALITY AFFECTING PREGNANCY, SINGLE OR UNSPECIFIED FETUS: ICD-10-CM

## 2025-02-05 DIAGNOSIS — O99.019 ANTEPARTUM ANEMIA: ICD-10-CM

## 2025-02-05 DIAGNOSIS — O26.90 PREGNANCY RELATED CONDITION: ICD-10-CM

## 2025-02-05 PROCEDURE — 76811 OB US DETAILED SNGL FETUS: CPT | Mod: 26 | Performed by: STUDENT IN AN ORGANIZED HEALTH CARE EDUCATION/TRAINING PROGRAM

## 2025-02-05 PROCEDURE — 99214 OFFICE O/P EST MOD 30 MIN: CPT | Mod: 25 | Performed by: STUDENT IN AN ORGANIZED HEALTH CARE EDUCATION/TRAINING PROGRAM

## 2025-02-05 PROCEDURE — 76811 OB US DETAILED SNGL FETUS: CPT

## 2025-02-05 PROCEDURE — G0108 DIAB MANAGE TRN  PER INDIV: HCPCS | Mod: 95 | Performed by: DIETITIAN, REGISTERED

## 2025-02-05 PROCEDURE — 76819 FETAL BIOPHYS PROFIL W/O NST: CPT | Mod: 26 | Performed by: STUDENT IN AN ORGANIZED HEALTH CARE EDUCATION/TRAINING PROGRAM

## 2025-02-05 PROCEDURE — 76819 FETAL BIOPHYS PROFIL W/O NST: CPT

## 2025-02-05 NOTE — PROGRESS NOTES
Diabetes Self-Management Education & Support    Type of service:  Video Visit    If the video visit is dropped, the video visit invitation should be resent by: Text to cell phone: [phone]    Originating Location (pt. Location): Home  Distant Location (provider location): Offsite  Mode of Communication:  Video Conference via Sproutel    Video Start Time:  8:02  Video End Time (time video stopped): 8:32    How would patient like to obtain AVS? Escomhart      Assessment: Manuela here for follow-up visit. OB recommended sooner visit d/t weight gain and pt's diet recall, reports she needs more protein. Reviewed diet today, pt not carb counting and eating more than recommended amounts, also drinking sugary beverages such as juice, coffee drinks and sports drinks. Reviewed what GDM diet is and examples, discussed importance of protein at all meals/snacks and spacing meals/snacks out by at least 2-3 hours to avoid BG from rising too much. She reports at work her meal is spread out over almost an hour because she eats while working and doesn't think she can do anything different than this. We discussed checking at 2 hour claudette for this meal and if BG are elevated will need to discuss other ways that she can get in eating faster to see if this will improve BG. Reviewed appropriate weight gain for pregnancy. She will follow-up via [employer] weekly.    Ketones: trace/negative.   Fasting blood glucoses: 100% in target  After breakfast: 50% in target. Only 2 readings  After lunch: 100% in target.  After dinner: 80% in target.        Intervention  Educational topics covered today:  What to expect after delivery, future testing for Type 2 diabetes (2 hour OGTT at 6 week post-partum check-up and annual fasting blood glucose level), risk of GDM and planning ahead for future pregnancies, recommended lifestyle interventions for reducing the risk of Type 2 Diabetes, when to call a Diabetes Educator or OB provider    Educational  Materials provided today:  [place] Preventing Diabetes    Patient verbalized understanding of diabetes self-management education concepts discussed, opportunities for ongoing education and support, and recommendations provided today    Plan  Check glucose four times daily, before breakfast and 1 hour after each meal.  Check ketones once a week when readings are consistently negative.  Continue with recommended physical activity.  Continue to follow recommended meal plan: 30-45g carbohydratess at breakfast, 45-60g carbohydratess at lunch, 45-60g carbohydrates at supper, 15-30g carbohydrates at snacks.  Follow consistent carbohydrate meal plan, eat carbohydrates and protein/fat at all meals/snacks.    Send in Glucose Log (blood sugars) via [employer] in 7 days. If 3 or more blood sugars are above the goal at a given time, or if Ketones are small, moderate or large, call or [employer] message the diabetes educator.    Follow-up:    Follow up on Upcoming Diabetes Ed Appointments     Visit Type Date Time Department    GDM ED 2/5/2025  8:00 AM [facility] DIABETES EDUCATION        Subjective/Objective  Manuela is an 29 year old year old, presenting for the following diabetes education related to:      Accompanied by: Self  Gestational weeks: 34w5d  [facility] planned [facility] delivery: [facility]  Number of previous pregnancies: (Patient-Rptd) 0  Had any babies over 9 lbs: (Patient-Rptd) No  Previously had Gestational Diabetes: No  Have you ever had thyroid problems or taken thyroid medication?: No  Heart disease, mitral valve prolapse or rheumatic fever?: No  Hypertension : No  High Cholesterol: No  High Triglycerides: No  Do you use tobacco products?: No  Do you drink beer, wine or hard liquor?: (!) Yes    Cultural Influences/Ethnic Background:  Not  or       LMP  (LMP Unknown)     Weight gain  Wt Readings from Last 5 Encounters:   02/03/25 80.5 kg (177 lb 8 oz)   01/31/25 76.2 kg (168 lb)   01/15/25 74.8 kg (165 lb)   12/23/24  "73 kg (161 lb)   24 73.5 kg (162 lb)   ]    Estimated Date of Delivery: Mar 14, 2025    Blood Glucose/Ketone Log:   Date Ketones Fasting Post Breakfast Post Lunch Post Supper                108   2/1 trace 85  139 107   2/2 trace 85  110 119   2/3 trace 94 160 135 147   2/4 trace  139 118 122   2/5 negative 91      2/3 snacks + meal higher BG    Healthy Eating:  Pre-pregnancy weight (lbs):  (unknown)  Exercise:: Currently not exercising  Barrier to exercise: Other  Cultural/Yarsani diet restrictions?: No  Meal planning/habits: None  How many times a week on average do you eat food made away from home (restaurant/take-out)?: 5+  Meals include: Lunch, Dinner, Morning Snack, Afternoon Snack, Evening Snack, Breakfast  Breakfast: 1-2 hours after waking up. Energy drink (SF) + fruit OR [place] toast sticks x 8 (90g cho)  Lunch: 11-1pm [employer] microwave meal w/chicken or leftovers  Dinner: [id number] bowl and soup with noodles  Snacks: AM-skips; PM-\"another meal\"; HS-leftovers, taco bell,  Beverages: Water, Tea, Coffee, Milk, Juice, Sports drinks, Energy drinks, Coffee drinks, Other (see Comments)  How many servings of fruits/vegetables per day: (Patient-Rptd) 1  Biggest challenges to healthy eating: (Patient-Rptd) Eating out, Evening snacking, Lack of time, Other  Pre-anahi vitamin?: (Patient-Rptd) Yes  Supplements?: (Patient-Rptd) Yes  List supplements currently taking: (Patient-Rptd) In chart  Experiencing nausea?: (Patient-Rptd) No  Experiencing heartburn?: (Patient-Rptd) Yes    Healthy Coping:  Informal Support system:: Family, Friends, Parent, Partner, Significant other  Stage of change: ACTION (Actively working towards change)    Current Management: Jaime Ray RD  Time Spent: 30 minutes  Encounter Type: Individual     Diabetes medication dose changes were made via the CDCES Standing Orders under the patient's referring provider.          Answers submitted by the patient for this " visit:  Questionnaire about: Diabetes problem (Submitted on 2/5/2025)  Chief Complaint: Diabetes problem

## 2025-02-05 NOTE — LETTER
2/5/2025         RE: Manuela Gruber  [address]  [place] [address]        Dear Colleague,    Thank you for referring your patient, Manuela Gruber, to the [facility]. Please see a copy of my visit note below.    Diabetes Self-Management Education & Support    Type of service:  Video Visit    If the video visit is dropped, the video visit invitation should be resent by: Text to cell phone: [phone]    Originating Location (pt. Location): Home  Distant Location (provider location): Offsite  Mode of Communication:  Video Conference via [employer]an[employer] Start Time:  8:02  Video End Time (time video stopped): 8:32    How would patient like to obtain AVS? [employer]      Assessment: Manuela here for follow-up visit. OB recommended sooner visit d/t weight gain and pt's diet recall, reports she needs more protein. Reviewed diet today, pt not carb counting and eating more than recommended amounts, also drinking sugary beverages such as juice, coffee drinks and sports drinks. Reviewed what GDM diet is and examples, discussed importance of protein at all meals/snacks and spacing meals/snacks out by at least 2-3 hours to avoid BG from rising too much. She reports at work her meal is spread out over almost an hour because she eats while working and doesn't think she can do anything different than this. We discussed checking at 2 hour claudette for this meal and if BG are elevated will need to discuss other ways that she can get in eating faster to see if this will improve BG. Reviewed appropriate weight gain for pregnancy. She will follow-up via [employer] weekly.    Ketones: trace/negative.   Fasting blood glucoses: 100% in target  After breakfast: 50% in target. Only 2 readings  After lunch: 100% in target.  After dinner: 80% in target.        Intervention  Educational topics covered today:  What to expect after delivery, future testing for Type 2 diabetes (2 hour OGTT at 6 week  post-partum check-up and annual fasting blood glucose level), risk of GDM and planning ahead for future pregnancies, recommended lifestyle interventions for reducing the risk of Type 2 Diabetes, when to call a Diabetes Educator or OB provider    Educational Materials provided today:  [place] Preventing Diabetes    Patient verbalized understanding of diabetes self-management education concepts discussed, opportunities for ongoing education and support, and recommendations provided today    Plan  Check glucose four times daily, before breakfast and 1 hour after each meal.  Check ketones once a week when readings are consistently negative.  Continue with recommended physical activity.  Continue to follow recommended meal plan: 30-45g carbohydratess at breakfast, 45-60g carbohydratess at lunch, 45-60g carbohydrates at supper, 15-30g carbohydrates at snacks.  Follow consistent carbohydrate meal plan, eat carbohydrates and protein/fat at all meals/snacks.    Send in Glucose Log (blood sugars) via [employer] in 7 days. If 3 or more blood sugars are above the goal at a given time, or if Ketones are small, moderate or large, call or [email or web address] message the diabetes educator.    Follow-up:    Follow up on Upcoming Diabetes Ed Appointments     Visit Type Date Time Department    GDM ED 2/5/2025  8:00 AM [facility] DIABETES EDUCATION        Subjective/Objective  Manuela is an 29 year old year old, presenting for the following diabetes education related to:      Accompanied by: Self  Gestational weeks: 34w5d  [facility]ospital planned for delivery: [facility]  Number of previous pregnancies: (Patient-Rptd) 0  Had any babies over 9 lbs: (Patient-Rptd) No  Previously had Gestational Diabetes: No  Have you ever had thyroid problems or taken thyroid medication?: No  Heart disease, mitral valve prolapse or rheumatic fever?: No  Hypertension : No  High Cholesterol: No  High Triglycerides: No  Do you use tobacco products?: No  Do you drink beer, wine or hard  "liquor?: (!) Yes    Cultural Influences/Ethnic Background:  Not  or       LMP  (LMP Unknown)     Weight gain  Wt Readings from Last 5 Encounters:   25 80.5 kg (177 lb 8 oz)   25 76.2 kg (168 lb)   01/15/25 74.8 kg (165 lb)   24 73 kg (161 lb)   24 73.5 kg (162 lb)   ]    Estimated Date of Delivery: Mar 14, 2025    Blood Glucose/Ketone Log:   Date Ketones Fasting Post Breakfast Post Lunch Post Supper                108   2/1 trace 85  139 107   2/2 trace 85  110 119   2/3 trace 94 160 135 147   2/4 trace  139 118 122   2/5 negative 91      2/3 snacks + meal higher BG    Healthy Eating:  Pre-pregnancy weight (lbs):  (unknown)  Exercise:: Currently not exercising  Barrier to exercise: Other  Cultural/Restoration diet restrictions?: No  Meal planning/habits: None  How many times a week on average do you eat food made away from home (restaurant/take-out)?: 5+  Meals include: Lunch, Dinner, Morning Snack, Afternoon Snack, Evening Snack, Breakfast  Breakfast: 1-2 hours after waking up. Energy drink (SF) + fruit OR [place]vian toast sticks x 8 (90g cho)  Lunch: 11-1pm [employer] microwave meal w/chicken or leftovers  Dinner: [id number] bowl and soup with noodles  Snacks: AM-skips; PM-\"another meal\"; HS-leftovers, taco bell,  Beverages: Water, Tea, Coffee, Milk, Juice, Sports drinks, Energy drinks, Coffee drinks, Other (see Comments)  How many servings of fruits/vegetables per day: (Patient-Rptd) 1  Biggest challenges to healthy eating: (Patient-Rptd) Eating out, Evening snacking, Lack of time, Other  Pre-anahi vitamin?: (Patient-Rptd) Yes  Supplements?: (Patient-Rptd) Yes  List supplements currently taking: (Patient-Rptd) In chart  Experiencing nausea?: (Patient-Rptd) No  Experiencing heartburn?: (Patient-Rptd) Yes    Healthy Coping:  Informal Support system:: Family, Friends, Parent, Partner, Significant other  Stage of change: ACTION (Actively working towards change)    Current " Management: Jaime Ray RD  Time Spent: 30 minutes  Encounter Type: Individual     Diabetes medication dose changes were made via the [facility] Standing Orders under the patient's referring provider.          Answers submitted by the patient for this visit:  Questionnaire about: Diabetes problem (Submitted on 2/5/2025)  Chief Complaint: Diabetes problem

## 2025-02-05 NOTE — PROGRESS NOTES
The patient was seen for an ultrasound in the [facility] today.  For a detailed report of the ultrasound examination, please see the ultrasound report which can be found under the imaging tab.     If you have questions regarding today's evaluation or if we can be of further service, please contact the Maternal-Fetal Medicine Center.    Liliya Nelson MD  Maternal Fetal Medicine

## 2025-02-05 NOTE — PATIENT INSTRUCTIONS
"Plan to share your glucose and ketone information with diabetes education weekly via [employer], send sooner than this if 3 or more BG are above goals or you have small or larger ketones. If you are unable to access [employer] you can also call our triage line to provide number this way, leave a message and we will call you back.    1. Check glucose 4 times daily, before breakfast daily and 1 hour after each meal, or as recommended.  Blood glucose goal before breakfast: <95 mg/dL  1 hour after start of meals:  <140 mg/dL OR  2 hours after start of meal: <120mg/dL (can do this if you forget 1 hour check)     -Lancets should be placed in a sharps container or you can use a laundry container, do not throw lancets in the trash.  If using laundry container, once mostly full, can duct-tape the lid closed, label \"Sharps-do not recycle\" and then place in trash. You can call your [employer] service to find appropriate drop off sites for lancets.    2. Check ketones daily or once a week after they have been negative for 7 days in a row. If ketones are elevated, let your diabetes educator know and continue to check daily until they are negative for 7 days in a row.    3. Continue with recommended physical activity.    4. Continue to follow recommended meal plan: 30-45g carbs at breakfast, 45-60g carbs at lunch, 45-60g carbs at supper, 15-30g carbs at snacks.  Follow consistent CHO meal plan, eat CHO and protein/fat at all meals/snacks.  1/3rd cup cooked pasta or rice=15g carbohydrate so 1 cup is 45g carbohydrate or about the size of your fist.  Read nutrition labels on packages or online when eating out.  Try to get sources of protein at each meal/snack. Protein sources: meat, poultry, fish/seafood, milk*, yogurt* (greek best), eggs, nuts/seeds/nut butters, protein powder* (make sure it is 3rd party tested, I like Leslie), cottage cheese, tempeh, seitan, lentils*, tofu, edamame*  *also contain some carbohydrates    5. Follow-up with OB " doctor as recommended.    6. Call or MyChart message your diabetes educator if 3 or more blood sugars are above the goal in 1 week or if ketones are elevated (small or larger).     After Delivery:    Check blood sugar 4 times per week to be sure that the numbers have gone back to normal after baby is born. Check blood sugar before breakfast or 2 hours after the start of a meal.     Blood sugar goals are different when you are not pregnant:    Before breakfast: Less than 100  2 hours after a meal: Less than 140    If you have elevated numbers, contact your OB/GYN or primary care provider.    2. Have a 2-hour glucose tolerance test done at your post-partum check-up.    3. Continue with healthy eating and physical activity to get back to your pre-pregnancy weight.     4.  Have your fasting blood sugar checked once a year.    5. Plan ahead for future pregnancies - eat healthy, keep active, work with your doctor to check for gestational diabetes early on in the pregnancy and check blood sugars as recommended by your doctor.     [facility] for the [facility]:  For Your Diabetes Education or Nutrition Appointments Call:  [phone]   For Diabetes Education and Nutrition Related Questions:   Phone: [phone]  Send MyChart Message   If you need a medication refill please contact your pharmacy. Please allow 3 business days for your refills to be completed.

## 2025-02-05 NOTE — NURSING NOTE
Manuela Gruber is a  at 34w5d who presents to [facility] for a comprehensive ultrasound. States fasting blood sugars and post meal blood sugars have been in target range. Pt reports positive fetal movement. Pt denies bldg/lof/change in discharge, contractions, headache, vision changes, chest pain/SOB or edema. SBAR given to Dr. LITTLE Nelson, see note in Epic.      Elizabeth Garcia RN

## 2025-02-06 NOTE — TELEPHONE ENCOUNTER
PHONE NOTE: CNM on-call reached out to patient to see if she had any further questions.  Reviewed maternal-fetal medicine ultrasound focused on UVV.  Also mentioned polyhydramnios is moderate but may be helped with getting blood sugars in line.  Patient states that this is quite a new diagnosis so that she will work on this.  Encourage patient to reach out to maternal-fetal medicine if she has further questions regarding her UVB assessment.  No further questions.  Patient is already scheduled out with MFM and CNM visits.  Stressed the importance of getting to all of those visits even though it can seem overwhelming.  Patient agrees with plan.

## 2025-02-10 PROBLEM — O40.9XX0 POLYHYDRAMNIOS AFFECTING PREGNANCY: Status: ACTIVE | Noted: 2025-02-10

## 2025-02-12 ENCOUNTER — OFFICE VISIT (OUTPATIENT)
Dept: MATERNAL FETAL MEDICINE | Facility: HOSPITAL | Age: 30
End: 2025-02-12
Attending: STUDENT IN AN ORGANIZED HEALTH CARE EDUCATION/TRAINING PROGRAM
Payer: COMMERCIAL

## 2025-02-12 ENCOUNTER — ANCILLARY PROCEDURE (OUTPATIENT)
Dept: ULTRASOUND IMAGING | Facility: HOSPITAL | Age: 30
End: 2025-02-12
Attending: STUDENT IN AN ORGANIZED HEALTH CARE EDUCATION/TRAINING PROGRAM
Payer: COMMERCIAL

## 2025-02-12 ENCOUNTER — MYC MEDICAL ADVICE (OUTPATIENT)
Dept: EDUCATION SERVICES | Facility: CLINIC | Age: 30
End: 2025-02-12

## 2025-02-12 DIAGNOSIS — Z34.00 SUPERVISION OF NORMAL FIRST PREGNANCY, ANTEPARTUM: Primary | ICD-10-CM

## 2025-02-12 DIAGNOSIS — O99.019 ANTEPARTUM ANEMIA: ICD-10-CM

## 2025-02-12 DIAGNOSIS — O35.8XX0 UMBILICAL VEIN ABNORMALITY AFFECTING PREGNANCY, SINGLE OR UNSPECIFIED FETUS: ICD-10-CM

## 2025-02-12 DIAGNOSIS — Z91.89 AT RISK FOR COMPLICATION OF PREGNANCY: ICD-10-CM

## 2025-02-12 PROCEDURE — 76819 FETAL BIOPHYS PROFIL W/O NST: CPT

## 2025-02-12 PROCEDURE — 76819 FETAL BIOPHYS PROFIL W/O NST: CPT | Mod: 26 | Performed by: STUDENT IN AN ORGANIZED HEALTH CARE EDUCATION/TRAINING PROGRAM

## 2025-02-12 NOTE — NURSING NOTE
Manuela S Yasmani is a  at 35w5d who presents to [facility] for BPP. Pt reports positive fetal movement. Pt denies bldg/lof/change in discharge, contractions, headache, vision changes, chest pain/SOB or edema. SBAR given to Dr. LITTLE Nelson, see note in Epic.

## 2025-02-12 NOTE — TELEPHONE ENCOUNTER
Gestational Diabetes Follow-up    Subjective/Objective:    Manuela S Yasmani sent in blood glucose log for review. Last date of communication was: 2/5/25.    Gestational diabetes is being managed with diet and activity    Taking diabetes medications: no    Estimated Date of Delivery: Mar 14, 2025    BG/Food Log:         Assessment:    Ketones: small.   Fasting blood glucoses: 75% in target.  After breakfast: 71% in target.  After lunch: 100% in target.  After dinner: 100% in target.    Blood glucose in good control. Has reasons why blood glucose likely elevated when high after-breakfast on 2/6 and 2/7 and higher fasting blood glucose. No changes recommended. Will see if can send in food record to review since reports ketones but was encouraged to follow up sooner last week due to weight gain.    Plan/Response:  No changes in the patient's current treatment plan.  Will see if she can add more calories with fats.  Follow-up in 1 week.  MyChart reply sent.    Vanessa Mann RDN, LD, CDCES     Any diabetes medication dose changes were made via the CDE Protocol and Collaborative Practice Agreement with the patient's referring provider. A copy of this encounter was shared with the provider.

## 2025-02-12 NOTE — PROGRESS NOTES
The patient was seen for an ultrasound in the [facility] today.  For a detailed report of the ultrasound examination, please see the ultrasound report which can be found under the imaging tab.     If you have questions regarding today's evaluation or if we can be of further service, please contact the Maternal-Fetal Medicine Center.    Liliya Nelson MD  Maternal Fetal Medicine

## 2025-02-18 ENCOUNTER — ANCILLARY PROCEDURE (OUTPATIENT)
Dept: ULTRASOUND IMAGING | Facility: HOSPITAL | Age: 30
End: 2025-02-18
Attending: OBSTETRICS & GYNECOLOGY
Payer: COMMERCIAL

## 2025-02-18 ENCOUNTER — OFFICE VISIT (OUTPATIENT)
Dept: MATERNAL FETAL MEDICINE | Facility: HOSPITAL | Age: 30
End: 2025-02-18
Attending: OBSTETRICS & GYNECOLOGY
Payer: COMMERCIAL

## 2025-02-18 DIAGNOSIS — O35.8XX0 UMBILICAL VEIN ABNORMALITY AFFECTING PREGNANCY, SINGLE OR UNSPECIFIED FETUS: ICD-10-CM

## 2025-02-18 DIAGNOSIS — O40.3XX0 POLYHYDRAMNIOS IN THIRD TRIMESTER COMPLICATION, SINGLE OR UNSPECIFIED FETUS: ICD-10-CM

## 2025-02-18 DIAGNOSIS — O35.8XX0 UMBILICAL VEIN ABNORMALITY AFFECTING PREGNANCY, SINGLE OR UNSPECIFIED FETUS: Primary | ICD-10-CM

## 2025-02-18 PROCEDURE — 76816 OB US FOLLOW-UP PER FETUS: CPT

## 2025-02-18 PROCEDURE — 76816 OB US FOLLOW-UP PER FETUS: CPT | Mod: 26 | Performed by: OBSTETRICS & GYNECOLOGY

## 2025-02-18 PROCEDURE — 99213 OFFICE O/P EST LOW 20 MIN: CPT | Mod: 25 | Performed by: OBSTETRICS & GYNECOLOGY

## 2025-02-18 PROCEDURE — 76819 FETAL BIOPHYS PROFIL W/O NST: CPT

## 2025-02-18 NOTE — PROGRESS NOTES
"Please see \"Imaging\" tab under \"Chart Review\" for details of today's visit.    Aurora Meza    "

## 2025-02-18 NOTE — NURSING NOTE
Patient is a G1 at 36w4d here for RL2 US. Patient reports positive fetal movement, denies pain, denies contractions, leaking of fluid, or bleeding. Reports blood sugar values fasting 90's and one to two hr post prandial 120's-140's, but usually <100's. Education provided to patient on today's US. SBAR given to LAM HUERTA, see their note in Epic.    Carey Alberto RN on 2/18/2025 at 1:42 PM

## 2025-02-20 ENCOUNTER — INFUSION THERAPY VISIT (OUTPATIENT)
Dept: INFUSION THERAPY | Facility: HOSPITAL | Age: 30
End: 2025-02-20
Payer: COMMERCIAL

## 2025-02-20 VITALS
TEMPERATURE: 98.5 F | SYSTOLIC BLOOD PRESSURE: 128 MMHG | DIASTOLIC BLOOD PRESSURE: 77 MMHG | OXYGEN SATURATION: 98 % | HEART RATE: 107 BPM | RESPIRATION RATE: 18 BRPM

## 2025-02-20 DIAGNOSIS — O99.019 ANTEPARTUM ANEMIA: Primary | ICD-10-CM

## 2025-02-20 PROCEDURE — 250N000011 HC RX IP 250 OP 636: Performed by: ADVANCED PRACTICE MIDWIFE

## 2025-02-20 PROCEDURE — 258N000003 HC RX IP 258 OP 636: Performed by: ADVANCED PRACTICE MIDWIFE

## 2025-02-20 RX ORDER — DIPHENHYDRAMINE HYDROCHLORIDE 50 MG/ML
50 INJECTION INTRAMUSCULAR; INTRAVENOUS
Start: 2025-02-22

## 2025-02-20 RX ORDER — ALBUTEROL SULFATE 0.83 MG/ML
2.5 SOLUTION RESPIRATORY (INHALATION)
Status: DISCONTINUED | OUTPATIENT
Start: 2025-02-20 | End: 2025-02-20 | Stop reason: HOSPADM

## 2025-02-20 RX ORDER — MEPERIDINE HYDROCHLORIDE 25 MG/ML
25 INJECTION INTRAMUSCULAR; INTRAVENOUS; SUBCUTANEOUS
OUTPATIENT
Start: 2025-02-22

## 2025-02-20 RX ORDER — DIPHENHYDRAMINE HYDROCHLORIDE 50 MG/ML
25 INJECTION INTRAMUSCULAR; INTRAVENOUS
Status: DISCONTINUED | OUTPATIENT
Start: 2025-02-20 | End: 2025-02-20 | Stop reason: HOSPADM

## 2025-02-20 RX ORDER — DIPHENHYDRAMINE HYDROCHLORIDE 50 MG/ML
50 INJECTION INTRAMUSCULAR; INTRAVENOUS
Status: DISCONTINUED | OUTPATIENT
Start: 2025-02-20 | End: 2025-02-20 | Stop reason: HOSPADM

## 2025-02-20 RX ORDER — METHYLPREDNISOLONE SODIUM SUCCINATE 40 MG/ML
40 INJECTION INTRAMUSCULAR; INTRAVENOUS
Start: 2025-02-22

## 2025-02-20 RX ORDER — EPINEPHRINE 1 MG/ML
0.3 INJECTION, SOLUTION INTRAMUSCULAR; SUBCUTANEOUS EVERY 5 MIN PRN
Status: DISCONTINUED | OUTPATIENT
Start: 2025-02-20 | End: 2025-02-20 | Stop reason: HOSPADM

## 2025-02-20 RX ORDER — HEPARIN SODIUM,PORCINE 10 UNIT/ML
5-20 VIAL (ML) INTRAVENOUS DAILY PRN
OUTPATIENT
Start: 2025-02-22

## 2025-02-20 RX ORDER — HEPARIN SODIUM (PORCINE) LOCK FLUSH IV SOLN 100 UNIT/ML 100 UNIT/ML
5 SOLUTION INTRAVENOUS
OUTPATIENT
Start: 2025-02-22

## 2025-02-20 RX ORDER — EPINEPHRINE 1 MG/ML
0.3 INJECTION, SOLUTION INTRAMUSCULAR; SUBCUTANEOUS EVERY 5 MIN PRN
OUTPATIENT
Start: 2025-02-22

## 2025-02-20 RX ORDER — MEPERIDINE HYDROCHLORIDE 25 MG/ML
25 INJECTION INTRAMUSCULAR; INTRAVENOUS; SUBCUTANEOUS
Status: DISCONTINUED | OUTPATIENT
Start: 2025-02-20 | End: 2025-02-20 | Stop reason: HOSPADM

## 2025-02-20 RX ORDER — ALBUTEROL SULFATE 90 UG/1
1-2 INHALANT RESPIRATORY (INHALATION)
Start: 2025-02-22

## 2025-02-20 RX ORDER — DIPHENHYDRAMINE HYDROCHLORIDE 50 MG/ML
25 INJECTION INTRAMUSCULAR; INTRAVENOUS
Start: 2025-02-22

## 2025-02-20 RX ORDER — ALBUTEROL SULFATE 0.83 MG/ML
2.5 SOLUTION RESPIRATORY (INHALATION)
OUTPATIENT
Start: 2025-02-22

## 2025-02-20 RX ORDER — METHYLPREDNISOLONE SODIUM SUCCINATE 40 MG/ML
40 INJECTION INTRAMUSCULAR; INTRAVENOUS
Status: DISCONTINUED | OUTPATIENT
Start: 2025-02-20 | End: 2025-02-20 | Stop reason: HOSPADM

## 2025-02-20 RX ORDER — ALBUTEROL SULFATE 90 UG/1
1-2 INHALANT RESPIRATORY (INHALATION)
Status: DISCONTINUED | OUTPATIENT
Start: 2025-02-20 | End: 2025-02-20 | Stop reason: HOSPADM

## 2025-02-20 RX ADMIN — SODIUM CHLORIDE 250 ML: 0.9 INJECTION, SOLUTION INTRAVENOUS at 12:24

## 2025-02-20 RX ADMIN — IRON SUCROSE 300 MG: 20 INJECTION, SOLUTION INTRAVENOUS at 12:31

## 2025-02-20 NOTE — PROGRESS NOTES
Infusion Nursing Note:  Manuela Gruber presents today for venofer dose 1/3.    Patient seen by provider today: No   present during visit today: Not Applicable.    Note: Manuela arrived ambulatory and in stable condition. Medication reviewed with patient. PIV placed in left forearm and good blood return noted. She did note some discomfort near the IV site for about 20 minutes after placement which then resolved. Iron administered per orders. She does not have any future appointments scheduled because she is being induced tomorrow 2/21. She will follow up with her OB regarding if she should finish her doses or not.      Intravenous Access:  Peripheral IV placed.    Treatment Conditions:  Not Applicable.      Post Infusion Assessment:  Patient tolerated infusion without incident.  Blood return noted pre and post infusion.  Site patent and intact, free from redness, edema or discomfort.  No evidence of extravasations.  Access discontinued per protocol.       Discharge Plan:   Patient and/or family verbalized understanding of discharge instructions and all questions answered.  AVS to patient via [employer].     Patient discharged in stable condition accompanied by: .  Departure Mode: Ambulatory.      Yasmine Ross RN

## 2025-02-21 ENCOUNTER — HOSPITAL ENCOUNTER (INPATIENT)
Facility: HOSPITAL | Age: 30
LOS: 5 days | Discharge: HOME | End: 2025-02-26
Attending: ADVANCED PRACTICE MIDWIFE | Admitting: ADVANCED PRACTICE MIDWIFE
Payer: COMMERCIAL

## 2025-02-21 DIAGNOSIS — Z91.89 AT RISK FOR COMPLICATION OF PREGNANCY: ICD-10-CM

## 2025-02-21 DIAGNOSIS — Z34.00 SUPERVISION OF NORMAL FIRST PREGNANCY, ANTEPARTUM: Primary | ICD-10-CM

## 2025-02-21 PROBLEM — Z34.90 ENCOUNTER FOR INDUCTION OF LABOR: Status: ACTIVE | Noted: 2025-02-21

## 2025-02-21 PROBLEM — O44.40 LOW-LYING PLACENTA: Status: RESOLVED | Noted: 2024-10-30 | Resolved: 2025-02-21

## 2025-02-21 LAB
ABO + RH BLD: NORMAL
BLD GP AB SCN SERPL QL: NEGATIVE
ERYTHROCYTE [DISTWIDTH] IN BLOOD BY AUTOMATED COUNT: 17.2 % (ref 10–15)
EST. AVERAGE GLUCOSE BLD GHB EST-MCNC: 94 MG/DL
HBA1C MFR BLD: 4.9 %
HCT VFR BLD AUTO: 25.6 % (ref 35–47)
HGB BLD-MCNC: 8.1 G/DL (ref 11.7–15.7)
HOLD SPECIMEN: NORMAL
HOLD SPECIMEN: NORMAL
MCH RBC QN AUTO: 26.3 PG (ref 26.5–33)
MCHC RBC AUTO-ENTMCNC: 31.6 G/DL (ref 31.5–36.5)
MCV RBC AUTO: 83 FL (ref 78–100)
PLATELET # BLD AUTO: 174 10E3/UL (ref 150–450)
RBC # BLD AUTO: 3.08 10E6/UL (ref 3.8–5.2)
SPECIMEN EXP DATE BLD: NORMAL
WBC # BLD AUTO: 10.8 10E3/UL (ref 4–11)

## 2025-02-21 PROCEDURE — 83036 HEMOGLOBIN GLYCOSYLATED A1C: CPT | Performed by: ADVANCED PRACTICE MIDWIFE

## 2025-02-21 PROCEDURE — 86780 TREPONEMA PALLIDUM: CPT | Performed by: ADVANCED PRACTICE MIDWIFE

## 2025-02-21 PROCEDURE — 120N000001 HC R&B MED SURG/OB

## 2025-02-21 PROCEDURE — 85027 COMPLETE CBC AUTOMATED: CPT | Performed by: ADVANCED PRACTICE MIDWIFE

## 2025-02-21 PROCEDURE — 250N000013 HC RX MED GY IP 250 OP 250 PS 637: Performed by: ADVANCED PRACTICE MIDWIFE

## 2025-02-21 PROCEDURE — 86923 COMPATIBILITY TEST ELECTRIC: CPT | Performed by: STUDENT IN AN ORGANIZED HEALTH CARE EDUCATION/TRAINING PROGRAM

## 2025-02-21 PROCEDURE — 86900 BLOOD TYPING SEROLOGIC ABO: CPT | Performed by: ADVANCED PRACTICE MIDWIFE

## 2025-02-21 PROCEDURE — 36415 COLL VENOUS BLD VENIPUNCTURE: CPT | Performed by: ADVANCED PRACTICE MIDWIFE

## 2025-02-21 RX ORDER — ONDANSETRON 2 MG/ML
4 INJECTION INTRAMUSCULAR; INTRAVENOUS EVERY 6 HOURS PRN
Status: DISCONTINUED | OUTPATIENT
Start: 2025-02-21 | End: 2025-02-24 | Stop reason: HOSPADM

## 2025-02-21 RX ORDER — OXYTOCIN/0.9 % SODIUM CHLORIDE 30/500 ML
340 PLASTIC BAG, INJECTION (ML) INTRAVENOUS CONTINUOUS PRN
Status: DISCONTINUED | OUTPATIENT
Start: 2025-02-21 | End: 2025-02-24 | Stop reason: HOSPADM

## 2025-02-21 RX ORDER — FENTANYL CITRATE 50 UG/ML
100 INJECTION, SOLUTION INTRAMUSCULAR; INTRAVENOUS
Status: DISCONTINUED | OUTPATIENT
Start: 2025-02-21 | End: 2025-02-24 | Stop reason: HOSPADM

## 2025-02-21 RX ORDER — ONDANSETRON 4 MG/1
4 TABLET, ORALLY DISINTEGRATING ORAL EVERY 6 HOURS PRN
Status: DISCONTINUED | OUTPATIENT
Start: 2025-02-21 | End: 2025-02-24 | Stop reason: HOSPADM

## 2025-02-21 RX ORDER — MEPERIDINE HYDROCHLORIDE 25 MG/ML
25 INJECTION INTRAMUSCULAR; INTRAVENOUS; SUBCUTANEOUS
Status: DISCONTINUED | OUTPATIENT
Start: 2025-02-21 | End: 2025-02-26 | Stop reason: HOSPADM

## 2025-02-21 RX ORDER — ALBUTEROL SULFATE 0.83 MG/ML
2.5 SOLUTION RESPIRATORY (INHALATION)
Status: DISCONTINUED | OUTPATIENT
Start: 2025-02-21 | End: 2025-02-23

## 2025-02-21 RX ORDER — HYDROXYZINE HYDROCHLORIDE 50 MG/1
50-100 TABLET, FILM COATED ORAL
Status: ACTIVE | OUTPATIENT
Start: 2025-02-21 | End: 2025-02-22

## 2025-02-21 RX ORDER — DEXTROSE MONOHYDRATE 25 G/50ML
25-50 INJECTION, SOLUTION INTRAVENOUS
Status: DISCONTINUED | OUTPATIENT
Start: 2025-02-21 | End: 2025-02-24 | Stop reason: HOSPADM

## 2025-02-21 RX ORDER — TERBUTALINE SULFATE 1 MG/ML
0.25 INJECTION SUBCUTANEOUS
Status: DISCONTINUED | OUTPATIENT
Start: 2025-02-21 | End: 2025-02-24 | Stop reason: HOSPADM

## 2025-02-21 RX ORDER — LIDOCAINE 40 MG/G
CREAM TOPICAL
Status: DISCONTINUED | OUTPATIENT
Start: 2025-02-21 | End: 2025-02-26 | Stop reason: HOSPADM

## 2025-02-21 RX ORDER — NALOXONE HYDROCHLORIDE 0.4 MG/ML
0.4 INJECTION, SOLUTION INTRAMUSCULAR; INTRAVENOUS; SUBCUTANEOUS
Status: DISCONTINUED | OUTPATIENT
Start: 2025-02-21 | End: 2025-02-26 | Stop reason: HOSPADM

## 2025-02-21 RX ORDER — CARBOPROST TROMETHAMINE 250 UG/ML
250 INJECTION, SOLUTION INTRAMUSCULAR
Status: DISCONTINUED | OUTPATIENT
Start: 2025-02-21 | End: 2025-02-23

## 2025-02-21 RX ORDER — DIPHENHYDRAMINE HYDROCHLORIDE 50 MG/ML
25 INJECTION, SOLUTION INTRAMUSCULAR; INTRAVENOUS
Status: DISCONTINUED | OUTPATIENT
Start: 2025-02-21 | End: 2025-02-26 | Stop reason: HOSPADM

## 2025-02-21 RX ORDER — OXYTOCIN 10 [USP'U]/ML
10 INJECTION, SOLUTION INTRAMUSCULAR; INTRAVENOUS
Status: DISCONTINUED | OUTPATIENT
Start: 2025-02-21 | End: 2025-02-24 | Stop reason: HOSPADM

## 2025-02-21 RX ORDER — LOPERAMIDE HYDROCHLORIDE 2 MG/1
2 CAPSULE ORAL
Status: DISCONTINUED | OUTPATIENT
Start: 2025-02-21 | End: 2025-02-24 | Stop reason: HOSPADM

## 2025-02-21 RX ORDER — NALOXONE HYDROCHLORIDE 0.4 MG/ML
0.2 INJECTION, SOLUTION INTRAMUSCULAR; INTRAVENOUS; SUBCUTANEOUS
Status: DISCONTINUED | OUTPATIENT
Start: 2025-02-21 | End: 2025-02-26 | Stop reason: HOSPADM

## 2025-02-21 RX ORDER — IBUPROFEN 800 MG/1
800 TABLET, FILM COATED ORAL
Status: COMPLETED | OUTPATIENT
Start: 2025-02-21 | End: 2025-02-23

## 2025-02-21 RX ORDER — METOCLOPRAMIDE HYDROCHLORIDE 5 MG/ML
10 INJECTION INTRAMUSCULAR; INTRAVENOUS EVERY 6 HOURS PRN
Status: DISCONTINUED | OUTPATIENT
Start: 2025-02-21 | End: 2025-02-24 | Stop reason: HOSPADM

## 2025-02-21 RX ORDER — METOCLOPRAMIDE 10 MG/1
10 TABLET ORAL EVERY 6 HOURS PRN
Status: DISCONTINUED | OUTPATIENT
Start: 2025-02-21 | End: 2025-02-24 | Stop reason: HOSPADM

## 2025-02-21 RX ORDER — METHYLPREDNISOLONE SODIUM SUCCINATE 40 MG/ML
40 INJECTION INTRAMUSCULAR; INTRAVENOUS
Status: DISCONTINUED | OUTPATIENT
Start: 2025-02-21 | End: 2025-02-26 | Stop reason: HOSPADM

## 2025-02-21 RX ORDER — SODIUM CHLORIDE, SODIUM LACTATE, POTASSIUM CHLORIDE, CALCIUM CHLORIDE 600; 310; 30; 20 MG/100ML; MG/100ML; MG/100ML; MG/100ML
INJECTION, SOLUTION INTRAVENOUS CONTINUOUS
Status: DISCONTINUED | OUTPATIENT
Start: 2025-02-21 | End: 2025-02-24 | Stop reason: HOSPADM

## 2025-02-21 RX ORDER — OXYTOCIN/0.9 % SODIUM CHLORIDE 30/500 ML
100-340 PLASTIC BAG, INJECTION (ML) INTRAVENOUS CONTINUOUS PRN
Status: DISCONTINUED | OUTPATIENT
Start: 2025-02-21 | End: 2025-02-26 | Stop reason: HOSPADM

## 2025-02-21 RX ORDER — METHYLERGONOVINE MALEATE 0.2 MG/ML
200 INJECTION INTRAVENOUS
Status: DISCONTINUED | OUTPATIENT
Start: 2025-02-21 | End: 2025-02-24 | Stop reason: HOSPADM

## 2025-02-21 RX ORDER — OXYTOCIN 10 [USP'U]/ML
10 INJECTION, SOLUTION INTRAMUSCULAR; INTRAVENOUS
Status: DISCONTINUED | OUTPATIENT
Start: 2025-02-21 | End: 2025-02-26 | Stop reason: HOSPADM

## 2025-02-21 RX ORDER — LOPERAMIDE HYDROCHLORIDE 2 MG/1
4 CAPSULE ORAL
Status: COMPLETED | OUTPATIENT
Start: 2025-02-21 | End: 2025-02-22

## 2025-02-21 RX ORDER — MISOPROSTOL 100 UG/1
25 TABLET ORAL
Status: DISCONTINUED | OUTPATIENT
Start: 2025-02-21 | End: 2025-02-24 | Stop reason: HOSPADM

## 2025-02-21 RX ORDER — KETOROLAC TROMETHAMINE 30 MG/ML
15 INJECTION, SOLUTION INTRAMUSCULAR; INTRAVENOUS
Status: COMPLETED | OUTPATIENT
Start: 2025-02-21 | End: 2025-02-23

## 2025-02-21 RX ORDER — NICOTINE POLACRILEX 4 MG
15-30 LOZENGE BUCCAL
Status: DISCONTINUED | OUTPATIENT
Start: 2025-02-21 | End: 2025-02-24 | Stop reason: HOSPADM

## 2025-02-21 RX ORDER — ARIPIPRAZOLE 5 MG/1
5 TABLET ORAL DAILY
Status: DISCONTINUED | OUTPATIENT
Start: 2025-02-22 | End: 2025-02-26 | Stop reason: HOSPADM

## 2025-02-21 RX ORDER — TRANEXAMIC ACID 10 MG/ML
1 INJECTION, SOLUTION INTRAVENOUS EVERY 30 MIN PRN
Status: DISCONTINUED | OUTPATIENT
Start: 2025-02-21 | End: 2025-02-24 | Stop reason: HOSPADM

## 2025-02-21 RX ORDER — MISOPROSTOL 200 UG/1
400 TABLET ORAL
Status: DISCONTINUED | OUTPATIENT
Start: 2025-02-21 | End: 2025-02-24 | Stop reason: HOSPADM

## 2025-02-21 RX ORDER — MORPHINE SULFATE 10 MG/ML
10 INJECTION, SOLUTION INTRAMUSCULAR; INTRAVENOUS
Status: COMPLETED | OUTPATIENT
Start: 2025-02-21 | End: 2025-02-23

## 2025-02-21 RX ORDER — MISOPROSTOL 200 UG/1
800 TABLET ORAL
Status: DISCONTINUED | OUTPATIENT
Start: 2025-02-21 | End: 2025-02-24 | Stop reason: HOSPADM

## 2025-02-21 RX ORDER — ALBUTEROL SULFATE 90 UG/1
1-2 INHALANT RESPIRATORY (INHALATION)
Status: DISCONTINUED | OUTPATIENT
Start: 2025-02-21 | End: 2025-02-23

## 2025-02-21 RX ORDER — DIPHENHYDRAMINE HYDROCHLORIDE 50 MG/ML
50 INJECTION, SOLUTION INTRAMUSCULAR; INTRAVENOUS
Status: DISCONTINUED | OUTPATIENT
Start: 2025-02-21 | End: 2025-02-26 | Stop reason: HOSPADM

## 2025-02-21 RX ORDER — PROCHLORPERAZINE MALEATE 10 MG
10 TABLET ORAL EVERY 6 HOURS PRN
Status: DISCONTINUED | OUTPATIENT
Start: 2025-02-21 | End: 2025-02-24 | Stop reason: HOSPADM

## 2025-02-21 RX ORDER — CITRIC ACID/SODIUM CITRATE 334-500MG
30 SOLUTION, ORAL ORAL
Status: DISCONTINUED | OUTPATIENT
Start: 2025-02-21 | End: 2025-02-24 | Stop reason: HOSPADM

## 2025-02-21 RX ADMIN — MISOPROSTOL 25 MCG: 100 TABLET ORAL at 23:10

## 2025-02-21 RX ADMIN — MISOPROSTOL 25 MCG: 100 TABLET ORAL at 21:15

## 2025-02-21 ASSESSMENT — ACTIVITIES OF DAILY LIVING (ADL)
ADLS_ACUITY_SCORE: 41
ADLS_ACUITY_SCORE: 15

## 2025-02-22 LAB
GLUCOSE BLDC GLUCOMTR-MCNC: 84 MG/DL (ref 70–99)
GLUCOSE BLDC GLUCOMTR-MCNC: 98 MG/DL (ref 70–99)
HGB BLD-MCNC: 8.1 G/DL (ref 11.7–15.7)
T PALLIDUM AB SER QL: NONREACTIVE

## 2025-02-22 PROCEDURE — 250N000009 HC RX 250: Performed by: MIDWIFE

## 2025-02-22 PROCEDURE — 258N000003 HC RX IP 258 OP 636: Performed by: MIDWIFE

## 2025-02-22 PROCEDURE — 250N000013 HC RX MED GY IP 250 OP 250 PS 637: Performed by: ADVANCED PRACTICE MIDWIFE

## 2025-02-22 PROCEDURE — 36415 COLL VENOUS BLD VENIPUNCTURE: CPT | Performed by: MIDWIFE

## 2025-02-22 PROCEDURE — 85018 HEMOGLOBIN: CPT | Performed by: MIDWIFE

## 2025-02-22 PROCEDURE — 258N000003 HC RX IP 258 OP 636: Performed by: ADVANCED PRACTICE MIDWIFE

## 2025-02-22 PROCEDURE — 120N000001 HC R&B MED SURG/OB

## 2025-02-22 PROCEDURE — 250N000011 HC RX IP 250 OP 636: Performed by: ADVANCED PRACTICE MIDWIFE

## 2025-02-22 RX ORDER — LIDOCAINE 40 MG/G
CREAM TOPICAL
Status: DISCONTINUED | OUTPATIENT
Start: 2025-02-22 | End: 2025-02-24 | Stop reason: HOSPADM

## 2025-02-22 RX ORDER — OXYTOCIN/0.9 % SODIUM CHLORIDE 30/500 ML
1-24 PLASTIC BAG, INJECTION (ML) INTRAVENOUS CONTINUOUS
Status: DISCONTINUED | OUTPATIENT
Start: 2025-02-22 | End: 2025-02-24 | Stop reason: HOSPADM

## 2025-02-22 RX ORDER — SODIUM CHLORIDE, SODIUM LACTATE, POTASSIUM CHLORIDE, CALCIUM CHLORIDE 600; 310; 30; 20 MG/100ML; MG/100ML; MG/100ML; MG/100ML
INJECTION, SOLUTION INTRAVENOUS CONTINUOUS PRN
Status: DISCONTINUED | OUTPATIENT
Start: 2025-02-22 | End: 2025-02-24 | Stop reason: HOSPADM

## 2025-02-22 RX ADMIN — SODIUM CHLORIDE, SODIUM LACTATE, POTASSIUM CHLORIDE, AND CALCIUM CHLORIDE 500 ML: .6; .31; .03; .02 INJECTION, SOLUTION INTRAVENOUS at 12:51

## 2025-02-22 RX ADMIN — ARIPIPRAZOLE 5 MG: 5 TABLET ORAL at 09:52

## 2025-02-22 RX ADMIN — Medication 2 MILLI-UNITS/MIN: at 15:34

## 2025-02-22 RX ADMIN — MISOPROSTOL 25 MCG: 100 TABLET ORAL at 04:10

## 2025-02-22 RX ADMIN — IRON SUCROSE 200 MG: 20 INJECTION, SOLUTION INTRAVENOUS at 00:09

## 2025-02-22 RX ADMIN — Medication 10 MILLI-UNITS/MIN: at 21:56

## 2025-02-22 RX ADMIN — MISOPROSTOL 25 MCG: 100 TABLET ORAL at 06:17

## 2025-02-22 RX ADMIN — SODIUM CHLORIDE, POTASSIUM CHLORIDE, SODIUM LACTATE AND CALCIUM CHLORIDE: 600; 310; 30; 20 INJECTION, SOLUTION INTRAVENOUS at 14:04

## 2025-02-22 RX ADMIN — SODIUM CHLORIDE, SODIUM LACTATE, POTASSIUM CHLORIDE, AND CALCIUM CHLORIDE 500 ML: .6; .31; .03; .02 INJECTION, SOLUTION INTRAVENOUS at 09:17

## 2025-02-22 RX ADMIN — MISOPROSTOL 25 MCG: 100 TABLET ORAL at 08:24

## 2025-02-22 RX ADMIN — MISOPROSTOL 25 MCG: 100 TABLET ORAL at 02:01

## 2025-02-22 RX ADMIN — Medication 10 MILLI-UNITS/MIN: at 20:04

## 2025-02-22 RX ADMIN — Medication 12 MILLI-UNITS/MIN: at 21:14

## 2025-02-22 ASSESSMENT — ACTIVITIES OF DAILY LIVING (ADL)
ADLS_ACUITY_SCORE: 15

## 2025-02-22 NOTE — PROVIDER NOTIFICATION
Discussed with Tete TIWARI EFM tracing with minimal variability will continue to monitor and then increase pitocin if able.

## 2025-02-22 NOTE — PROGRESS NOTES
Labor Progress Note:       Assessment:  29 year old  at 37w1d   IOL recommended by MFM: UVV (laminar flow), mild polyhydramnios (AUGUSTUS 28), GDM A1  Bipolar disorder (stable on Abilify)  GBS Neg  O POS  Anemia (hemoglobin on admit 8.1, and unchanged now S/P IV Venofer infusions x , 2025)  Taking low-dose ASA  FHR: Cat 1, with some intermittent tachysystole  6 doses of oral Cytotec given (last given at 0824, held doses for tachysystole)   Cervical ripening complete--appropriate for start of Pitocin induction of labor     Plan:   -Start IV Pitocin per low dose protocol for IOL  -Routine CNM support & management  -Continuous EFM per protocol  -Encourage ambulation, rest, hydration  -Evaluate progress in 2 hours or as needed for change in plan  -pt plans cord blood collection/banking--collect labs required within 24 hours of delivery--patient prefers to wait for collection until this evening.  -Plan collection of cord blood for O Pos blood type per protocol.  -consider AMTSL if prolonged pitocin use (pitocin, methergine, misoprostol and TXA as needed)   -Anticipate progress and   -plan 1 more dose IV Venofer (give at least 24 hours after last dose which occurred just after midnight on 2025 at 0009).        Subjective:   Manuela has been resting intermittently.  Says she does not feel contractions yet.  Voiding without issue.  Partner is supportive at the bedside but left for cervix check.  Discussed findings of cervical ripening with patient--discussed starting IV Pitocin per low-dose protocol now and patient is agreeable.  Orders were placed, RN contacted CNM and said that partner Junito came back into the room after cervical check and said that he would like to go home and shower and then return in about 45 minutes.  They prefer to wait to start Pitocin until bili is back so he can be present at the bedside.      Objective:     /71   Temp 99  F (37.2  C) (Oral)   Resp 16   Ht 1.499 m  "(4' 11\")   Wt 83.2 kg (183 lb 6.4 oz)   LMP  (LMP Unknown)   SpO2 98%   BMI 37.04 kg/m      Membrane Status: Intact     FHR: Baseline: 140 bpm, Variability: Moderate (6 - 25 bpm), Accelerations: Present and Decelerations: Absent     Uterine contractions:  Frequency: Every 1.5-4 minutes, Duration:  seconds.      SVE: 1.5/70%/medium/vertex mid/ -2 ballotable bag of water intact        Tete Mahan CNM, APRN     Total time spent on the date of this encounter doing: chart review, review of test results, patient visit, the physical exam & procedure, education, counseling, developing this plan of care, and documenting = 20 February 22, 2025 2:20 pm   "

## 2025-02-22 NOTE — PROGRESS NOTES
Labor Progress Note:       Assessment:  29 year old  at 37w1d   IOL recommended by MFM: UVV (laminar flow), mild polyhydramnios (AUGUSTUS 28), GDM A1  Bipolar disorder (stable on Abilify)  GBS Neg  O POS  Anemia (hemoglobin on admit 8.1, and unchanged now S/P IV Venofer infusions x , 2025)  Taking low-dose ASA  FHR: Cat 1, with some intermittent tachysystole  6 doses of oral Cytotec given (last given at 0824, held doses for tachysystole)         Plan:   -Continue expectantly at this time. Routine CNM support & management  -held 7th dose cytotec due to tachysystole.   -Another bolus 500 cc LR IV  -Discussed with in-house OB Dr. Rodriguez briefly regardin ripening process and tachysystole.  And whether the tachysystole protocols pertain to the situation whether or not patient is in labor.  She said give it time.  -Continuous EFM  -Encourage ambulation, rest, hydration  -Evaluate progress as needed in 1 to 2 hours  -pt plans cord blood collection/banking--collect labs required within 24 hours of delivery  -Plan collection of cord blood for O Pos blood type per protocol.  -consider AMTSL if prolonged pitocin use (pitocin, methergine, misoprostol and TXA as needed)   -Anticipate progress and   -plan 1 more dose IV Venofer (give at least 24 hours after last dose which occurred just after midnight on 2025 at 0009)        Subjective:   Manuela has been sleeping.  CNM offered another dose of Cytotec per patient request earlier, but then with the intermittent tachysystole recommended waiting again.  After discussion with in-house OB Dr. Rodriguez, discussed with patient that we can be patient.  (Dr. Rodriguez mentioned if wanting to give more Cytotec, could give a dose of terbutaline contractions down first, reduce tachysystole), but no concern for fetal compromise at this time with a category 1 tracing.  Discussed giving another LR IV bolus.  Possibly the bit of tachysystole can continue ripening patient's cervix.  " Discussed that we can give terbutaline if needed.  Patient declines any change to plan now or more intervention at this time including Cook catheter.  Patient can rest and we will check back again in 1 to 2 hours.  Voiding without issue.  Patient's partner arrived just as KARIE was leaving the room so briefly recapped with him.    Objective:     /71   Temp 99  F (37.2  C) (Oral)   Resp 16   Ht 1.499 m (4' 11\")   Wt 83.2 kg (183 lb 6.4 oz)   LMP  (LMP Unknown)   SpO2 98%   BMI 37.04 kg/m      Membrane Status: Intact     FHR: Baseline: 140 bpm, Variability: Moderate (6 - 25 bpm), Accelerations: Present and Decelerations: Absent     Uterine contractions:  Frequency: Every 1-4 minutes, Duration:  seconds.  Some periods of intermittent tachysystole.     SVE: Deferred        Tete Mahan CNM, APRN     Total time spent on the date of this encounter doing: chart review, review of test results, patient visit, the physical exam & procedure, education, counseling, developing this plan of care, and documenting = 20 minutes     February 22, 2025, 1 PM  "

## 2025-02-22 NOTE — PROGRESS NOTES
Data: Patient admitted to room 3 at 1930. Patient is a . Prenatal record reviewed.   OB History    Para Term  AB Living   1 0 0 0 0 0   SAB IAB Ectopic Multiple Live Births   0 0 0 0 0      # Outcome Date GA Lbr Brody/2nd Weight Sex Type Anes PTL Lv   1 Current            .  Medical History:   Past Medical History:   Diagnosis Date    Bipolar disorder (H)    .  Gestational age 37w0d. Vital signs per doc flowsheet. Fetal movement present. Patient reports No chief complaint on file.   as reason for admission. Support person Junito is present.  Action: Care of patient assumed at 1930. Verbal consent for EFM, external fetal monitors applied. Admission assessment completed. Patient and support persons educated on labor process. Patient instructed to report change in fetal movement, contractions, vaginal leaking of fluid or bleeding, abdominal pain, or any concerns related to the pregnancy to her nurse/physician. Patient oriented to room, call light in reach.   Response: Wiliam Krueger CNM informed of admission. Plan per provider is cervical ripening meds. Patient verbalized understanding of education and verbalized agreement with plan.

## 2025-02-22 NOTE — H&P
CNM Labor Admission History & Physical    Manuela Gruber is a 29 year old  at 37w0d here for induction of labor due to polyhydramnios and an umbilical vein varix in the setting of GDM A1.   Partner/support Person: Junito  Language: English  Clinic: [facility]  Provider: Sobieski CNM team    Manuela Gruber is admitted to [facility] on 2025 at 8:21 PM       History of present inllness/Chief Complaint:  29 year old  at 37w0d here for induction of labor due to polyhydramnios and an umbilical vein varix in the setting of GDM A1. She is also anemic with a hgb on admission of 8.1. She received her first dose of IV venofer yesterday afternoon.     Patient also has a dx of Bipolar disorder for which she is receiving treatment.     Patient reports contractions are None      Baby active Yes  Membranes are intact.  Bloody show No   Any changes with medical history since last prenatal visit No  Syphilis screening: Ordered    Obstetrical history  Estimated Date of Delivery: Mar 14, 2025 determined by ultrasound  No LMP recorded (lmp unknown). Patient is pregnant.   Dating U/S: 2024  RUBY per first trimester ultrasound    Fetal anatomic survey: 20w 4d, abnormal: Low lying placenta.  Resolved at 26w 6d.     25 ultrasound at 34w 5d, EFW 94th%, AC 98th%, moderate polyhydramnios(AUGUSTUS 30.5 cm), BPP 88, umbilical vein verix    25 MFM ultrasound: Presentation: cephalic  Placenta: Left lateral, No Previa, > 2 cm from internal os.  Umbilical cord: 3 vessel cord  Amniotic fluid: Amount of AF: Polyhydramnios, Mild. MVP 10.0 cm. AUGUSTUS 28.4 cm. Q1 7.0 cm, Q2 6.9 cm, Q3 6.9 cm, Q4 7.7 cm  No evidence of hydrops on today's exam.   Umbilical vein varix present   Biophysical profile score    PRENATAL COURSE  Prenatal care began at 11 wks gestation for a total of 8 prenatal visits.  Total wt gain 34 lbs; Pregravid BMI 29.67  Prenatal Blood Pressure: WNL  Prenatal course was complicated by  polyhydramnios, umbilical vein varix, GDM A1, and anemia  Tdap: 24  Rhogam: N/A    Patient Active Problem List   Diagnosis    Supervision of normal first pregnancy, antepartum    Bipolar affective disorder, currently active (H)    BMI 30.0-30.9,adult    At risk for complication of pregnancy    Low-lying placenta    Antepartum anemia    Diet controlled gestational diabetes mellitus (GDM) in third trimester    Encounter for triage in pregnant patient    Polyhydramnios affecting pregnancy       HISTORY  No Known Allergies  Past Medical History:   Diagnosis Date    Bipolar disorder (H)      No past surgical history on file.  Family History   Problem Relation Age of Onset    Mental Illness Mother     No Known Problems Father     Mental Illness Maternal Grandmother     No Known Problems Maternal Grandfather     No Known Problems Paternal Grandmother     No Known Problems Paternal Grandfather     Heart Defect Paternal Uncle     Mental Illness Maternal Aunt     No Known Problems Maternal Uncle      Social History     Tobacco Use    Smoking status: Never     Passive exposure: Current    Smokeless tobacco: Never   Substance Use Topics    Alcohol use: Not Currently     OB History    Para Term  AB Living   1 0 0 0 0 0   SAB IAB Ectopic Multiple Live Births   0 0 0 0 0      # Outcome Date GA Lbr Brody/2nd Weight Sex Type Anes PTL Lv   1 Current                LABS:  Lab Results   Component Value Date    AS Negative 2024    HGB 8.9 (L) 2025    HEPBANG Nonreactive 2024       GBS Status: Negative  Rubella: Immune    HIV: Non-Reactive   Platelets:  174 on admission today  1hr GCT:  135. Did not pass 3 hour GTT.     ROS   Pt is alert and oriented  Pt denies significant constitutional symptoms including fever and/or malaise.    Pt denies significant respiratory, cardiovacular, GI, or muscular/skeletal complaints.    Neuro: Denies HA and visual changes  Muscoloskeletal: Denies except for discomforts  r/t pregnancy     PHYSICAL EXAM:  LMP  (LMP Unknown)   General appearance:  healthy, alert, and active   Heart: RRR  Lungs: CTA bilaterally, normal respiratory effort  Abdomen: gravid, single vertex fetus, non-tender, EFW AGA lbs. Cephalic fetal lie confirmed with bedside ultrasound  Legs: reflexes 2+ bilaterally, no clonus, no edema     Contractions: Pt is yuko in an irregular pattern and not aware of contractions    Fetal heart tones: Baseline 120   Variability: moderate   Accelerations: present  Decelerations: absent    NST: reactive    Cervix: closed/ 60/ Anterior/ soft/ -2, Vtx  Bloody show: no  Membranes:  Intact    ASSESSMENT:  29 year old  with radford IUP 37w0d for induction of labor.  Indication Polyhydramnios, AUGUSTUS 28.4, umbilical vein varix  Anemia, hgb 8.1 on admission.  First dose of IV Venofer yesterday afternoon  GDM A1  Bipolar disorder  NST reactive  Membranes intact  Cephalic fetal lie confirmed with bedside ultrasound  GBS negative and membranes intact  O+ blood type    PLAN:  Admit - see IP orders  Routine CNM care  Labs ordered: Hemoglobin and type and screen, RPR    IV Venofer ordered to be given now    GDM antepartum early labor/cervical ripening protocol order set placed    Teaching done r/t induction, comfort measures, pain management options, and labor processes.      Cervical ripening with po misoprostol    Theraputic sleep    Recommend TXA prior to delivery and consider an additional dose of IV venofer in 24 hours(to total 3 doses in one week)      CHACHA Castellano, KARIE, WHNP-BC

## 2025-02-22 NOTE — PROGRESS NOTES
"Labor Progress Note:       Assessment:  29 year old  at 37w1d   IOL recommended by MFM: UVV (laminar flow), mild polyhydramnios (AUGUSTUS 28), GDM A1  Bipolar disorder (stable on Abilify)  GBS Neg  O POS  Anemia (hemoglobin on admit 8.1, now S/P IV Venofer infusions x 2 2025, 2025)  Taking low-dose ASA  FHR: Cat 1, with some tachysystole improved with LR bolus  6 doses of oral Cytotec given (last at 0824, next due at 1024 if stable)        Plan:   -IV LR bolus 500 cc for tachysystole now  -Hemoglobin x 1 now  -Continue expectantly at this time. Routine CNM support & management  -Patient plans to eat breakfast prior to evaluation/ cervix check  -Continuous EFM  -Encourage ambulation, rest, hydration  -Evaluate progress in 1 hour or sooner with a change in status.  -pt plans cord blood collection/banking--collect labs required within 24 hours prior to delivery if possible  -Plan collection of cord blood for O Pos blood type per protocol.  -consider AMTSL if prolonged pitocin use (pitocin, methergine, misoprostol and TXA as needed)   -Anticipate progress and   -plan 1 more dose IV Venofer (give at least 24 hours after last dose which occurred just after midnight on 2025 at 0009)        Subjective:   Manuela is sitting up in bed, conversational.  Patient's partner Junito is at the bedside and supportive, engaged asking questions.  Patient states that she slept pretty well \"as much as I get at home\".  They tend to sleep in a bit on the weekends and then stay up late until 1 or 2 AM.  This is her \"normal sleep and wake rhythm at home\".  Patient states that she has had some nausea the last couple times she tried to eat something.  Had small bites and some applesauce.  Will try to eat breakfast now and then consents to cervix check at that time to evaluate if her cervix has ripened enough to proceed with induction of labor today.  Discussed the ripening process into induction of labor process in detail with " "lots of different options touched on (Cytotec, Cook catheter, low-dose Pitocin) and will go into more detail after cervix check.  Discussed that patient should have patients with the process, and that there is no 1 right way through this.  We also want to be efficient as well.  Briefly discussed periods of tachysystole and that we may need to hold a dose of Cytotec and maybe consider another method of cervical ripening if needed.  Voiding without issue.        Objective:     /59   Temp 99.5  F (37.5  C) (Oral)   Resp 20   Ht 1.499 m (4' 11\")   Wt 83.2 kg (183 lb 6.4 oz)   LMP  (LMP Unknown)   SpO2 98%   BMI 37.04 kg/m      Membrane Status: Intact     FHR: Baseline: 150 bpm, Variability: Moderate (6 - 25 bpm), 15 x 15 accelerations: Present and Decelerations: Absent     Uterine contractions:  Frequency: Every 2-3 minutes, Duration:  seconds.  soft resting tone, some periods of tachysystole so patient given 500 mL LR bolus IV encouraged to hydrate orally and eat a small breakfast..  Contractions are very mild patient does not feel them at all.     SVE: Deferred until after breakfast per patient request.        Tete Mahan CNM, APRN     Total time spent on the date of this encounter doing: chart review, review of test results, patient visit, the physical exam & procedure, education, counseling, developing this plan of care, and documenting = 20 minutes     February 22, 2025 9:40 AM  "

## 2025-02-22 NOTE — PROVIDER NOTIFICATION
Strip reviewed with CNM. Concern about intermittent tachysystole.  Pt noted to be sleeping. Will hold 0600 Cytotec for now.

## 2025-02-22 NOTE — PLAN OF CARE
Problem: Adult Inpatient Plan of Care  Goal: Optimal Comfort and Wellbeing  Outcome: Progressing  Intervention: Provide Person-Centered Care  Recent Flowsheet Documentation  Taken 2/21/2025 2116 by Lenore Hernandez RN  Trust Relationship/Rapport:   care explained   choices provided   emotional support provided   Goal Outcome Evaluation:       Reviewed pt's birth plan with her prior to her induction. Discussed her wishes and plans for care, including standard practices at [facility] and reasons why there might be deviations from her plan. She stated that she understands and is agreeable.  She would like clear, frequent communication with herself and her partner prior to cares for herself and her baby.

## 2025-02-22 NOTE — PROGRESS NOTES
"Labor Progress Note:    Patient Name:  Manuela Gruber  :      1995  MRN:      [id number]      Subjective:  Manuela Gruber is coping well with cervical ripening. She is resting/sleeping.      Objective:  /76   Temp 99.6  F (37.6  C) (Oral)   Resp 16   Ht 1.499 m (4' 11\")   Wt 83.2 kg (183 lb 6.4 oz)   LMP  (LMP Unknown)   SpO2 100%   BMI 37.04 kg/m    FHR: 140  Uterine contractions: Q 2-4 minutes every 40-90 seconds  SVE: Deferred  Bloody show: no  Membranes:  Intact       Fetal heart tones: Baseline 140   Variability: moderate   Accelerations: present  Decelerations: absent       ASSESSMENT:  29 year old  with radford IUP 37w0d for induction of labor.  Indication Polyhydramnios, AUGUSTUS 28.4, umbilical vein varix  Anemia, hgb 8.1 on admission.      S/P 4 doses of cytotec    First dose of IV Venofer on . Second dose administered on admission on     GDM A1  Bipolar disorder  NST reactive  Membranes intact  GBS negative  O+ blood type     PLAN:  Routine CNM care       Continue with GDM antepartum early labor/cervical ripening protocol order set      Continue cervical ripening with po misoprostol     Recommend TXA prior to delivery and consider an additional dose of IV venofer in 24 hours(to total 3 doses in one week)    Day time CNM to reevaluate progress via cervical exam this AM. Reevaluate sooner with a change in status.        CHACHA Castellano, KARIE, NP-BC      Date:  2025  Time:  5:45 AM     "

## 2025-02-22 NOTE — PROGRESS NOTES
Labor Progress Note:       Assessment:  29 year old  at 37w1d   IOL recommended by MFM: UVV (laminar flow), mild polyhydramnios (AUGUSTUS 28), GDM A1  Bipolar disorder (stable on Abilify)  GBS Neg  O POS  Anemia (hemoglobin on admit 8.1, and unchanged now S/P IV Venofer infusions x , 2025)  Taking low-dose ASA  FHR: Cat 1  S/P 6 doses of oral Cytotec given   Pitocin begun 1530        Plan:   - IV Pitocin per low dose protocol for IOL--titrate to achieve effective UC pattern  -Routine CNM support & management  -Continuous EFM per protocol  -Encourage ambulation, rest, hydration  -Evaluate progress in 2-3 hrs or as needed  -pt plans cord blood collection/banking--collect labs required within 24 hours of delivery--patient prefers to wait for collection until this evening.  -Plan collection of cord blood for O Pos blood type per protocol.  -consider AMTSL if prolonged pitocin use (pitocin, methergine, misoprostol and TXA as needed)   -Anticipate progress and   -plan 1 more dose IV Venofer after midnight. (Checked with inpt pharmacist)        Subjective:   Patient's partner just returned from home.  IV Pitocin begun.  Patient's parents were visiting in the room, but then left  just after CNM arrived.   Voiding without issue.  Questions regarding breathing during labor.  Question regarding why her meals are limited to 45 g carbohydrates--would like to be able to choose for herself what she would like to eat.  Agreed that since patient has been in good control of her blood sugars, we will change her diet to a regular diet and patient will make her own choices for meals.  Stressed focusing on high-protein, vegetables and lower carb meals in general however in labor patient has more freedom to choose what would help her feel better.  Patient requesting birthing ball in the room now that Pitocin has started.      Objective:  Pitocin started at 1530 at 2 milliunits/min.    /74 (BP Location: Right arm,  "Patient Position: Sitting, Cuff Size: Adult Regular)   Temp 99.3  F (37.4  C) (Oral)   Resp 12   Ht 1.499 m (4' 11\")   Wt 83.2 kg (183 lb 6.4 oz)   LMP  (LMP Unknown)   SpO2 99%   BMI 37.04 kg/m      Membrane Status: Intact     FHR: Baseline: 145 bpm, Variability: minimal, Accelerations: Absent and Decelerations: Absent  (At Pitocin start, baseline 135, variability moderate, accelerations present, decelerations absent.  Now at 4:15 PM, trying to get Stacy monitor situated, min variability, so will monitor closely and make adjustments to IV fluids, positioning pt and pitocin if needed)     Uterine contractions:  Frequency: Every 4 minutes, Duration:  seconds. soft resting tone.     SVE: Deferred until patient more active with Pitocin        Tete Mahan CNM, APRN     Total time spent on the date of this encounter doing: chart review, review of test results, patient visit, the physical exam & procedure, education, counseling, developing this plan of care, and documenting = 15 minutes     February 22, 2025NOW@   "

## 2025-02-22 NOTE — PROVIDER NOTIFICATION
Manuela requests writer to contact KARIE to discuss her grams of carbohydrates allowed at meals -  her dietician had recommended her to eat 50-60 g CHO at lunch and dinner.  KARIE paged.

## 2025-02-22 NOTE — PROGRESS NOTES
"Labor Progress Note:       Assessment:  29 year old  at 37w1d   IOL recommended by MFM: UVV (laminar flow), mild polyhydramnios (AUGUSTUS 28), GDM A1  Bipolar disorder (stable on Abilify)  GBS Neg  O POS  Anemia (hemoglobin on admit 8.1, and unchanged now S/P IV Venofer infusions x , 2025)  Taking low-dose ASA  FHR: Cat 1  S/P 6 doses of oral Cytotec given   Pitocin 4 mU/min (started at 1530)        Plan:   - IV Pitocin per low dose protocol for IOL--titrate to achieve effective UC pattern  -Routine CNM support & management  -Continuous EFM per protocol--Stacy in use now  -Encourage ambulation, rest, hydration  -Evaluate progress in 2-3 hrs or as needed  -pt plans cord blood collection/banking--collect labs for CBR required within 24 hours of delivery--patient prefers to wait for collection until this evening.  -Plan collection of cord blood for O Pos blood type per protocol.  -balance and Consider AMTSL if prolonged pitocin use (pitocin, methergine, misoprostol and TXA as needed)  -Anticipate progress and   -plan 1 more dose IV Venofer after midnight. (Checked with inpt pharmacist)  -Report will be given to oncoming CNM Herlinda Fields, who will assume care of the patient at 1800.        Subjective:   Manuela is coping well with contractions--really feeling them yet.  Patient is resting on her right side.  Voiding without issue.  Partner Junito supportive at bedside.   Discussed plan of care for the evening.  Patient is glad that Stacy is working now \"I do not think I could have handled the belts or the belly band much longer as it makes me feel nauseous when I eat.\"  Patient states that she probably will not take sleep meds unless absolutely needed, \"I usually sleep really well\".  Stressed the importance of getting good rest tonight as able (unless patient is in labor).  Encouraged rest especially in early labor if able.      Objective:    Pitocin at 4 milliunits/min    /61 (BP Location: " "Right arm, Patient Position: Left side, Cuff Size: Adult Regular)   Temp 99.1  F (37.3  C) (Oral)   Resp 16   Ht 1.499 m (4' 11\")   Wt 83.2 kg (183 lb 6.4 oz)   LMP  (LMP Unknown)   SpO2 98%   BMI 37.04 kg/m      Membrane Status: Intact     FHR: Baseline: 140 bpm, Variability: Moderate (6 - 25 bpm), 15 x 15 accelerations: Present and Decelerations: Absent     Uterine contractions:  Frequency: Every 2-3 minutes, Duration: 60-80 seconds.  Adequate with soft resting tone present.     SVE: Deferred until more uncomfortable/in labor     Tete Mahan CNM, APRN     Total time spent on the date of this encounter doing: chart review, review of test results, patient visit, the physical exam & procedure, education, counseling, developing this plan of care, and documenting = 20 minutes     February 22, 2025 5:30 PM  "

## 2025-02-22 NOTE — PROGRESS NOTES
Labor Progress Note:       Assessment:  29 year old  at 37w1d   IOL recommended by MFM: UVV (laminar flow), mild polyhydramnios (AUGUSTUS 28), GDM A1  Bipolar disorder (stable on Abilify)  GBS Neg  O POS  Anemia (hemoglobin on admit 8.1, and unchanged now S/P IV Venofer infusions x , 2025)  Taking low-dose ASA  FHR: Cat 1, with some tachysystole  6 doses of oral Cytotec given (last given at 0824, held 1024 dose for tachysystole)         Plan:   -Continue expectantly at this time. Routine CNM support & management  -held 7th dose cytotec due to tachysystole.  Discussed with patient and will wait 1 hour before reassessing or next dose will be given as soon as able.  Patient declines Cook catheter at this time patient declines Pitocin start at this time.  Would prefer to wait up to 1 hour to see if she can have another dose of Cytotec.  -Continuous EFM  -Encourage ambulation, rest, hydration  -Evaluate progress as needed for possible change in plan in 1 hour  -pt plans cord blood collection/banking--collect labs required within 24 hours of delivery  -Plan collection of cord blood for O Pos blood type per protocol.  -consider AMTSL if prolonged pitocin use (pitocin, methergine, misoprostol and TXA as needed)   -Anticipate progress and   -plan 1 more dose IV Venofer (give at least 24 hours after last dose which occurred just after midnight on 2025 at 0009)        Subjective:   Patient ate lunch, she is sitting up in bed and conversational.  Partner was out of the room during the cervix check and discussion of plan then when arrived briefly reviewed plan.  Voiding without issue.  Discussed cervical ripening process. Discussed that cervical checks are slightly subjective but that in this CNM's professional opinion, patient could benefit from a little more cervical ripening as she is borderline Bethea score of 6, but cervix is medium consistency.  Recommended continue with with cervical ripening  "process if possible.  But due to tachysystole, agreed to wait for 1 hour and then see if we can proceed with another dose of Cytotec.  Patient declines Cook catheter at this time.  Patient declines Pitocin start at this time (it would not be able to started at this time in any way due to intermittent periods of tachysystole now).  CNM read through booklet from [facility] for cord blood collection and cord tissue collection.      Objective:     /59   Temp 99.5  F (37.5  C) (Oral)   Resp 20   Ht 1.499 m (4' 11\")   Wt 83.2 kg (183 lb 6.4 oz)   LMP  (LMP Unknown)   SpO2 98%   BMI 37.04 kg/m      Membrane Status: Intact     FHR: Baseline: 135 bpm, Variability: Moderate (6 - 25 bpm), 15 x 15 accelerations: Present and Decelerations: Absent.  Brief period of minimal variability after patient was on her back for cervix check.  Maternal position changes and fetal heart tones improved.     Uterine contractions:  Frequency: Every 1-3 minutes, Duration:  seconds.  Some periods of tachysystole, needs longer periods of resting tone.  So seventh dose of oral Cytotec was held.       SVE: Fingertip/70%/medium consistency/anterior/vertex -2, bag of water intact patient technically 6 but due to medium consistency of cervix, recommend further cervical ripening.        Tete Mahan CNM, APRN     Total time spent on the date of this encounter doing: chart review, review of test results, patient visit, the physical exam & procedure, education, counseling, developing this plan of care, and documenting = 60 minutes     February 22, 2025 11:30 AM  "

## 2025-02-22 NOTE — PROGRESS NOTES
0009: IV Venofer infusion started. VSS. Pt denies s/s of reaction. Continuous presence at the bedside during infusion.    0024: Infusion completed. VSS. Pt denies s/s of reaction. IV saline locked.

## 2025-02-22 NOTE — PROVIDER NOTIFICATION
Writer discussed with Tete TIWARI at nurses station ctx frequency, Manuela does not feel ctx, plan to go with Tete bedside for evaluation.

## 2025-02-23 ENCOUNTER — ANESTHESIA (OUTPATIENT)
Dept: OBGYN | Facility: HOSPITAL | Age: 30
End: 2025-02-23
Payer: COMMERCIAL

## 2025-02-23 ENCOUNTER — ANESTHESIA EVENT (OUTPATIENT)
Dept: OBGYN | Facility: HOSPITAL | Age: 30
End: 2025-02-23
Payer: COMMERCIAL

## 2025-02-23 ENCOUNTER — MEDICAL CORRESPONDENCE (OUTPATIENT)
Dept: HEALTH INFORMATION MANAGEMENT | Facility: CLINIC | Age: 30
End: 2025-02-23

## 2025-02-23 PROBLEM — Z91.89 AT HIGH RISK FOR POSTPARTUM HEMORRHAGE: Status: ACTIVE | Noted: 2025-02-23

## 2025-02-23 PROBLEM — O26.03 EXCESSIVE WEIGHT GAIN IN PREGNANCY IN THIRD TRIMESTER: Status: ACTIVE | Noted: 2025-02-23

## 2025-02-23 PROBLEM — Z37.9 NORMAL LABOR: Status: ACTIVE | Noted: 2025-02-23

## 2025-02-23 PROBLEM — F31.9 BIPOLAR AFFECTIVE DISORDER, CURRENTLY ACTIVE (H): Status: ACTIVE | Noted: 2024-08-26

## 2025-02-23 PROBLEM — O28.3 ABNORMAL PREGNANCY US: Status: ACTIVE | Noted: 2025-02-23

## 2025-02-23 PROBLEM — O16.9 ELEVATED BLOOD PRESSURE AFFECTING PREGNANCY, ANTEPARTUM: Status: ACTIVE | Noted: 2025-02-23

## 2025-02-23 PROBLEM — Z36.89 ENCOUNTER FOR TRIAGE IN PREGNANT PATIENT: Status: RESOLVED | Noted: 2025-01-31 | Resolved: 2025-02-23

## 2025-02-23 LAB
ALBUMIN MFR UR ELPH: 32 MG/DL
ALBUMIN MFR UR ELPH: 91.2 MG/DL
ALBUMIN SERPL BCG-MCNC: 2.8 G/DL (ref 3.5–5.2)
ALP SERPL-CCNC: 125 U/L (ref 40–150)
ALT SERPL W P-5'-P-CCNC: 17 U/L (ref 0–50)
ANION GAP SERPL CALCULATED.3IONS-SCNC: 14 MMOL/L (ref 7–15)
AST SERPL W P-5'-P-CCNC: 23 U/L (ref 0–45)
BASOPHILS # BLD AUTO: 0 10E3/UL (ref 0–0.2)
BASOPHILS NFR BLD AUTO: 0 %
BILIRUB SERPL-MCNC: 0.2 MG/DL
BLD PROD TYP BPU: NORMAL
BLD PROD TYP BPU: NORMAL
BLOOD COMPONENT TYPE: NORMAL
BLOOD COMPONENT TYPE: NORMAL
BUN SERPL-MCNC: 8.6 MG/DL (ref 6–20)
CALCIUM SERPL-MCNC: 8.8 MG/DL (ref 8.8–10.4)
CHLORIDE SERPL-SCNC: 101 MMOL/L (ref 98–107)
CODING SYSTEM: NORMAL
CODING SYSTEM: NORMAL
CREAT SERPL-MCNC: 0.92 MG/DL (ref 0.51–0.95)
CREAT UR-MCNC: 62.8 MG/DL
CREAT UR-MCNC: 85.4 MG/DL
CROSSMATCH: NORMAL
CROSSMATCH: NORMAL
EGFRCR SERPLBLD CKD-EPI 2021: 86 ML/MIN/1.73M2
EOSINOPHIL # BLD AUTO: 0 10E3/UL (ref 0–0.7)
EOSINOPHIL NFR BLD AUTO: 0 %
ERYTHROCYTE [DISTWIDTH] IN BLOOD BY AUTOMATED COUNT: 17.9 % (ref 10–15)
GLUCOSE BLDC GLUCOMTR-MCNC: 107 MG/DL (ref 70–99)
GLUCOSE BLDC GLUCOMTR-MCNC: 75 MG/DL (ref 70–99)
GLUCOSE BLDC GLUCOMTR-MCNC: 82 MG/DL (ref 70–99)
GLUCOSE BLDC GLUCOMTR-MCNC: 86 MG/DL (ref 70–99)
GLUCOSE SERPL-MCNC: 80 MG/DL (ref 70–99)
HCO3 SERPL-SCNC: 18 MMOL/L (ref 22–29)
HCT VFR BLD AUTO: 27.8 % (ref 35–47)
HGB BLD-MCNC: 8.4 G/DL (ref 11.7–15.7)
IMM GRANULOCYTES # BLD: 0.3 10E3/UL
IMM GRANULOCYTES NFR BLD: 3 %
LYMPHOCYTES # BLD AUTO: 0.7 10E3/UL (ref 0.8–5.3)
LYMPHOCYTES NFR BLD AUTO: 6 %
MCH RBC QN AUTO: 25.5 PG (ref 26.5–33)
MCHC RBC AUTO-ENTMCNC: 30.2 G/DL (ref 31.5–36.5)
MCV RBC AUTO: 84 FL (ref 78–100)
MONOCYTES # BLD AUTO: 0.8 10E3/UL (ref 0–1.3)
MONOCYTES NFR BLD AUTO: 7 %
NEUTROPHILS # BLD AUTO: 10.8 10E3/UL (ref 1.6–8.3)
NEUTROPHILS NFR BLD AUTO: 85 %
NRBC # BLD AUTO: 0.1 10E3/UL
NRBC BLD AUTO-RTO: 0 /100
PLATELET # BLD AUTO: 159 10E3/UL (ref 150–450)
POTASSIUM SERPL-SCNC: 4 MMOL/L (ref 3.4–5.3)
PROT SERPL-MCNC: 5.4 G/DL (ref 6.4–8.3)
PROT/CREAT 24H UR: 0.37 MG/MG CR (ref 0–0.2)
PROT/CREAT 24H UR: 1.45 MG/MG CR (ref 0–0.2)
RBC # BLD AUTO: 3.3 10E6/UL (ref 3.8–5.2)
SODIUM SERPL-SCNC: 133 MMOL/L (ref 135–145)
UNIT ABO/RH: NORMAL
UNIT ABO/RH: NORMAL
UNIT NUMBER: NORMAL
UNIT NUMBER: NORMAL
UNIT STATUS: NORMAL
UNIT STATUS: NORMAL
UNIT TYPE ISBT: 5100
UNIT TYPE ISBT: 5100
WBC # BLD AUTO: 12.7 10E3/UL (ref 4–11)

## 2025-02-23 PROCEDURE — 710N000010 HC RECOVERY PHASE 1, LEVEL 2, PER MIN: Performed by: STUDENT IN AN ORGANIZED HEALTH CARE EDUCATION/TRAINING PROGRAM

## 2025-02-23 PROCEDURE — 370N000017 HC ANESTHESIA TECHNICAL FEE, PER MIN: Performed by: STUDENT IN AN ORGANIZED HEALTH CARE EDUCATION/TRAINING PROGRAM

## 2025-02-23 PROCEDURE — 250N000011 HC RX IP 250 OP 636: Performed by: STUDENT IN AN ORGANIZED HEALTH CARE EDUCATION/TRAINING PROGRAM

## 2025-02-23 PROCEDURE — 258N000003 HC RX IP 258 OP 636: Performed by: ADVANCED PRACTICE MIDWIFE

## 2025-02-23 PROCEDURE — 84156 ASSAY OF PROTEIN URINE: CPT | Performed by: MIDWIFE

## 2025-02-23 PROCEDURE — 250N000011 HC RX IP 250 OP 636: Performed by: MIDWIFE

## 2025-02-23 PROCEDURE — 250N000011 HC RX IP 250 OP 636: Performed by: ADVANCED PRACTICE MIDWIFE

## 2025-02-23 PROCEDURE — 272N000001 HC OR GENERAL SUPPLY STERILE: Performed by: STUDENT IN AN ORGANIZED HEALTH CARE EDUCATION/TRAINING PROGRAM

## 2025-02-23 PROCEDURE — 250N000013 HC RX MED GY IP 250 OP 250 PS 637: Performed by: STUDENT IN AN ORGANIZED HEALTH CARE EDUCATION/TRAINING PROGRAM

## 2025-02-23 PROCEDURE — 00HU33Z INSERTION OF INFUSION DEVICE INTO SPINAL CANAL, PERCUTANEOUS APPROACH: ICD-10-PCS | Performed by: STUDENT IN AN ORGANIZED HEALTH CARE EDUCATION/TRAINING PROGRAM

## 2025-02-23 PROCEDURE — 258N000003 HC RX IP 258 OP 636: Performed by: MIDWIFE

## 2025-02-23 PROCEDURE — 82040 ASSAY OF SERUM ALBUMIN: CPT | Performed by: MIDWIFE

## 2025-02-23 PROCEDURE — 250N000009 HC RX 250: Performed by: STUDENT IN AN ORGANIZED HEALTH CARE EDUCATION/TRAINING PROGRAM

## 2025-02-23 PROCEDURE — 250N000013 HC RX MED GY IP 250 OP 250 PS 637: Performed by: ADVANCED PRACTICE MIDWIFE

## 2025-02-23 PROCEDURE — 360N000076 HC SURGERY LEVEL 3, PER MIN: Performed by: STUDENT IN AN ORGANIZED HEALTH CARE EDUCATION/TRAINING PROGRAM

## 2025-02-23 PROCEDURE — 120N000001 HC R&B MED SURG/OB

## 2025-02-23 PROCEDURE — 258N000003 HC RX IP 258 OP 636: Performed by: STUDENT IN AN ORGANIZED HEALTH CARE EDUCATION/TRAINING PROGRAM

## 2025-02-23 PROCEDURE — 999N000248 HC STATISTIC IV INSERT WITH US BY RN

## 2025-02-23 PROCEDURE — 85025 COMPLETE CBC W/AUTO DIFF WBC: CPT | Performed by: MIDWIFE

## 2025-02-23 PROCEDURE — 250N000009 HC RX 250: Performed by: MIDWIFE

## 2025-02-23 PROCEDURE — 36415 COLL VENOUS BLD VENIPUNCTURE: CPT | Performed by: MIDWIFE

## 2025-02-23 PROCEDURE — 84156 ASSAY OF PROTEIN URINE: CPT | Performed by: ADVANCED PRACTICE MIDWIFE

## 2025-02-23 PROCEDURE — 3E0R3BZ INTRODUCTION OF ANESTHETIC AGENT INTO SPINAL CANAL, PERCUTANEOUS APPROACH: ICD-10-PCS | Performed by: STUDENT IN AN ORGANIZED HEALTH CARE EDUCATION/TRAINING PROGRAM

## 2025-02-23 RX ORDER — FENTANYL/ROPIVACAINE/NS/PF 2MCG/ML-.1
PLASTIC BAG, INJECTION (ML) EPIDURAL
Status: DISCONTINUED | OUTPATIENT
Start: 2025-02-23 | End: 2025-02-24 | Stop reason: HOSPADM

## 2025-02-23 RX ORDER — MORPHINE SULFATE 1 MG/ML
INJECTION, SOLUTION EPIDURAL; INTRATHECAL; INTRAVENOUS
Status: COMPLETED | OUTPATIENT
Start: 2025-02-23 | End: 2025-02-23

## 2025-02-23 RX ORDER — CITRIC ACID/SODIUM CITRATE 334-500MG
30 SOLUTION, ORAL ORAL
Status: COMPLETED | OUTPATIENT
Start: 2025-02-23 | End: 2025-02-23

## 2025-02-23 RX ORDER — OXYTOCIN 10 [USP'U]/ML
10 INJECTION, SOLUTION INTRAMUSCULAR; INTRAVENOUS
Status: DISCONTINUED | OUTPATIENT
Start: 2025-02-23 | End: 2025-02-24 | Stop reason: HOSPADM

## 2025-02-23 RX ORDER — BUPIVACAINE HYDROCHLORIDE 7.5 MG/ML
INJECTION, SOLUTION INTRASPINAL
Status: COMPLETED | OUTPATIENT
Start: 2025-02-23 | End: 2025-02-23

## 2025-02-23 RX ORDER — FENTANYL CITRATE 50 UG/ML
INJECTION, SOLUTION INTRAMUSCULAR; INTRAVENOUS PRN
Status: DISCONTINUED | OUTPATIENT
Start: 2025-02-23 | End: 2025-02-23

## 2025-02-23 RX ORDER — OXYTOCIN/0.9 % SODIUM CHLORIDE 30/500 ML
100-340 PLASTIC BAG, INJECTION (ML) INTRAVENOUS CONTINUOUS PRN
OUTPATIENT
Start: 2025-02-23

## 2025-02-23 RX ORDER — OXYTOCIN 10 [USP'U]/ML
10 INJECTION, SOLUTION INTRAMUSCULAR; INTRAVENOUS
OUTPATIENT
Start: 2025-02-23

## 2025-02-23 RX ORDER — NALBUPHINE HYDROCHLORIDE 20 MG/ML
2.5-5 INJECTION, SOLUTION INTRAMUSCULAR; INTRAVENOUS; SUBCUTANEOUS EVERY 6 HOURS PRN
Status: DISCONTINUED | OUTPATIENT
Start: 2025-02-23 | End: 2025-02-26 | Stop reason: HOSPADM

## 2025-02-23 RX ORDER — LOPERAMIDE HYDROCHLORIDE 2 MG/1
2 CAPSULE ORAL
Status: DISCONTINUED | OUTPATIENT
Start: 2025-02-23 | End: 2025-02-24 | Stop reason: HOSPADM

## 2025-02-23 RX ORDER — ONDANSETRON 2 MG/ML
INJECTION INTRAMUSCULAR; INTRAVENOUS PRN
Status: DISCONTINUED | OUTPATIENT
Start: 2025-02-23 | End: 2025-02-23

## 2025-02-23 RX ORDER — LOPERAMIDE HYDROCHLORIDE 2 MG/1
4 CAPSULE ORAL
Status: DISCONTINUED | OUTPATIENT
Start: 2025-02-23 | End: 2025-02-24 | Stop reason: HOSPADM

## 2025-02-23 RX ORDER — CARBOPROST TROMETHAMINE 250 UG/ML
250 INJECTION, SOLUTION INTRAMUSCULAR
Status: DISCONTINUED | OUTPATIENT
Start: 2025-02-23 | End: 2025-02-24 | Stop reason: HOSPADM

## 2025-02-23 RX ORDER — FENTANYL CITRATE 50 UG/ML
INJECTION, SOLUTION INTRAMUSCULAR; INTRAVENOUS
Status: COMPLETED | OUTPATIENT
Start: 2025-02-23 | End: 2025-02-23

## 2025-02-23 RX ORDER — OXYTOCIN/0.9 % SODIUM CHLORIDE 30/500 ML
340 PLASTIC BAG, INJECTION (ML) INTRAVENOUS CONTINUOUS PRN
Status: DISCONTINUED | OUTPATIENT
Start: 2025-02-23 | End: 2025-02-24 | Stop reason: HOSPADM

## 2025-02-23 RX ORDER — CEFAZOLIN SODIUM/WATER 2 G/20 ML
2 SYRINGE (ML) INTRAVENOUS SEE ADMIN INSTRUCTIONS
Status: DISCONTINUED | OUTPATIENT
Start: 2025-02-23 | End: 2025-02-24 | Stop reason: HOSPADM

## 2025-02-23 RX ORDER — FENTANYL CITRATE-0.9 % NACL/PF 10 MCG/ML
100 PLASTIC BAG, INJECTION (ML) INTRAVENOUS EVERY 5 MIN PRN
Status: DISCONTINUED | OUTPATIENT
Start: 2025-02-23 | End: 2025-02-24 | Stop reason: HOSPADM

## 2025-02-23 RX ORDER — ACETAMINOPHEN 325 MG/1
975 TABLET ORAL ONCE
Status: COMPLETED | OUTPATIENT
Start: 2025-02-23 | End: 2025-02-23

## 2025-02-23 RX ORDER — OXYTOCIN/0.9 % SODIUM CHLORIDE 30/500 ML
PLASTIC BAG, INJECTION (ML) INTRAVENOUS CONTINUOUS PRN
Status: DISCONTINUED | OUTPATIENT
Start: 2025-02-23 | End: 2025-02-23

## 2025-02-23 RX ORDER — NICOTINE POLACRILEX 4 MG
15-30 LOZENGE BUCCAL
Status: DISCONTINUED | OUTPATIENT
Start: 2025-02-23 | End: 2025-02-26 | Stop reason: HOSPADM

## 2025-02-23 RX ORDER — TRANEXAMIC ACID 10 MG/ML
1 INJECTION, SOLUTION INTRAVENOUS EVERY 30 MIN PRN
Status: DISCONTINUED | OUTPATIENT
Start: 2025-02-23 | End: 2025-02-24 | Stop reason: HOSPADM

## 2025-02-23 RX ORDER — FENTANYL/ROPIVACAINE/NS/PF 2MCG/ML-.1
PLASTIC BAG, INJECTION (ML) EPIDURAL
Status: DISCONTINUED | OUTPATIENT
Start: 2025-02-23 | End: 2025-02-23

## 2025-02-23 RX ORDER — MISOPROSTOL 200 UG/1
800 TABLET ORAL
Status: DISCONTINUED | OUTPATIENT
Start: 2025-02-23 | End: 2025-02-24 | Stop reason: HOSPADM

## 2025-02-23 RX ORDER — METHYLERGONOVINE MALEATE 0.2 MG/ML
200 INJECTION INTRAVENOUS
Status: DISCONTINUED | OUTPATIENT
Start: 2025-02-23 | End: 2025-02-24 | Stop reason: HOSPADM

## 2025-02-23 RX ORDER — SODIUM CHLORIDE 9 MG/ML
INJECTION, SOLUTION INTRAVENOUS CONTINUOUS PRN
Status: DISCONTINUED | OUTPATIENT
Start: 2025-02-23 | End: 2025-02-26 | Stop reason: HOSPADM

## 2025-02-23 RX ORDER — MISOPROSTOL 200 UG/1
400 TABLET ORAL
Status: DISCONTINUED | OUTPATIENT
Start: 2025-02-23 | End: 2025-02-24 | Stop reason: HOSPADM

## 2025-02-23 RX ORDER — LIDOCAINE HYDROCHLORIDE AND EPINEPHRINE 15; 5 MG/ML; UG/ML
INJECTION, SOLUTION EPIDURAL PRN
Status: DISCONTINUED | OUTPATIENT
Start: 2025-02-23 | End: 2025-02-23

## 2025-02-23 RX ORDER — DEXTROSE MONOHYDRATE 25 G/50ML
25-50 INJECTION, SOLUTION INTRAVENOUS
Status: DISCONTINUED | OUTPATIENT
Start: 2025-02-23 | End: 2025-02-26 | Stop reason: HOSPADM

## 2025-02-23 RX ORDER — DEXTROSE MONOHYDRATE 100 MG/ML
INJECTION, SOLUTION INTRAVENOUS CONTINUOUS PRN
Status: DISCONTINUED | OUTPATIENT
Start: 2025-02-23 | End: 2025-02-26 | Stop reason: HOSPADM

## 2025-02-23 RX ORDER — CEFAZOLIN SODIUM/WATER 2 G/20 ML
2 SYRINGE (ML) INTRAVENOUS
Status: COMPLETED | OUTPATIENT
Start: 2025-02-23 | End: 2025-02-23

## 2025-02-23 RX ORDER — HYDROXYZINE HYDROCHLORIDE 50 MG/1
100 TABLET, FILM COATED ORAL
Status: DISPENSED | OUTPATIENT
Start: 2025-02-23 | End: 2025-02-24

## 2025-02-23 RX ORDER — LIDOCAINE 40 MG/G
CREAM TOPICAL
Status: DISCONTINUED | OUTPATIENT
Start: 2025-02-23 | End: 2025-02-24 | Stop reason: HOSPADM

## 2025-02-23 RX ORDER — SODIUM CHLORIDE, SODIUM LACTATE, POTASSIUM CHLORIDE, CALCIUM CHLORIDE 600; 310; 30; 20 MG/100ML; MG/100ML; MG/100ML; MG/100ML
INJECTION, SOLUTION INTRAVENOUS CONTINUOUS
Status: DISCONTINUED | OUTPATIENT
Start: 2025-02-23 | End: 2025-02-24 | Stop reason: HOSPADM

## 2025-02-23 RX ADMIN — MORPHINE SULFATE 10 MG: 10 INJECTION, SOLUTION INTRAMUSCULAR; INTRAVENOUS at 02:29

## 2025-02-23 RX ADMIN — SODIUM CHLORIDE, POTASSIUM CHLORIDE, SODIUM LACTATE AND CALCIUM CHLORIDE: 600; 310; 30; 20 INJECTION, SOLUTION INTRAVENOUS at 22:36

## 2025-02-23 RX ADMIN — SODIUM CHLORIDE, POTASSIUM CHLORIDE, SODIUM LACTATE AND CALCIUM CHLORIDE: 600; 310; 30; 20 INJECTION, SOLUTION INTRAVENOUS at 18:04

## 2025-02-23 RX ADMIN — LIDOCAINE HYDROCHLORIDE,EPINEPHRINE BITARTRATE 2 ML: 15; .005 INJECTION, SOLUTION EPIDURAL; INFILTRATION; INTRACAUDAL; PERINEURAL at 08:39

## 2025-02-23 RX ADMIN — ONDANSETRON 8 MG: 2 INJECTION INTRAMUSCULAR; INTRAVENOUS at 22:59

## 2025-02-23 RX ADMIN — Medication 2 G: at 22:48

## 2025-02-23 RX ADMIN — SODIUM CHLORIDE, POTASSIUM CHLORIDE, SODIUM LACTATE AND CALCIUM CHLORIDE: 600; 310; 30; 20 INJECTION, SOLUTION INTRAVENOUS at 08:08

## 2025-02-23 RX ADMIN — BUPIVACAINE HYDROCHLORIDE IN DEXTROSE 1.2 ML: 7.5 INJECTION, SOLUTION SUBARACHNOID at 22:55

## 2025-02-23 RX ADMIN — Medication 340 ML/HR: at 23:24

## 2025-02-23 RX ADMIN — FENTANYL CITRATE 50 MCG: 50 INJECTION, SOLUTION INTRAMUSCULAR; INTRAVENOUS at 23:40

## 2025-02-23 RX ADMIN — Medication 0.15 MG: at 22:55

## 2025-02-23 RX ADMIN — FENTANYL CITRATE 40 MCG: 50 INJECTION, SOLUTION INTRAMUSCULAR; INTRAVENOUS at 23:33

## 2025-02-23 RX ADMIN — SODIUM CHLORIDE, POTASSIUM CHLORIDE, SODIUM LACTATE AND CALCIUM CHLORIDE: 600; 310; 30; 20 INJECTION, SOLUTION INTRAVENOUS at 13:10

## 2025-02-23 RX ADMIN — KETOROLAC TROMETHAMINE 30 MG: 30 INJECTION, SOLUTION INTRAMUSCULAR at 23:55

## 2025-02-23 RX ADMIN — Medication 10 ML: at 20:11

## 2025-02-23 RX ADMIN — HYDROXYZINE HYDROCHLORIDE 100 MG: 50 TABLET ORAL at 02:27

## 2025-02-23 RX ADMIN — AZITHROMYCIN MONOHYDRATE 500 MG: 500 INJECTION, POWDER, LYOPHILIZED, FOR SOLUTION INTRAVENOUS at 22:36

## 2025-02-23 RX ADMIN — Medication: at 20:41

## 2025-02-23 RX ADMIN — Medication 10 ML: at 14:45

## 2025-02-23 RX ADMIN — IRON SUCROSE 300 MG: 20 INJECTION, SOLUTION INTRAVENOUS at 12:02

## 2025-02-23 RX ADMIN — SODIUM CHLORIDE, SODIUM LACTATE, POTASSIUM CHLORIDE, AND CALCIUM CHLORIDE 500 ML: .6; .31; .03; .02 INJECTION, SOLUTION INTRAVENOUS at 13:12

## 2025-02-23 RX ADMIN — Medication 2 MILLI-UNITS/MIN: at 17:48

## 2025-02-23 RX ADMIN — ACETAMINOPHEN 975 MG: 325 TABLET ORAL at 22:09

## 2025-02-23 RX ADMIN — SODIUM CHLORIDE, SODIUM LACTATE, POTASSIUM CHLORIDE, AND CALCIUM CHLORIDE 500 ML: .6; .31; .03; .02 INJECTION, SOLUTION INTRAVENOUS at 19:06

## 2025-02-23 RX ADMIN — Medication: at 08:27

## 2025-02-23 RX ADMIN — FENTANYL CITRATE 10 MCG: 50 INJECTION, SOLUTION INTRAMUSCULAR; INTRAVENOUS at 22:55

## 2025-02-23 RX ADMIN — SODIUM CITRATE AND CITRIC ACID MONOHYDRATE 30 ML: 500; 334 SOLUTION ORAL at 22:09

## 2025-02-23 RX ADMIN — PHENYLEPHRINE HYDROCHLORIDE 0.5 MCG/KG/MIN: 10 INJECTION INTRAVENOUS at 22:55

## 2025-02-23 RX ADMIN — LIDOCAINE HYDROCHLORIDE,EPINEPHRINE BITARTRATE 3 ML: 15; .005 INJECTION, SOLUTION EPIDURAL; INFILTRATION; INTRACAUDAL; PERINEURAL at 08:35

## 2025-02-23 RX ADMIN — Medication: at 14:48

## 2025-02-23 ASSESSMENT — ACTIVITIES OF DAILY LIVING (ADL)
ADLS_ACUITY_SCORE: 15

## 2025-02-23 NOTE — ANESTHESIA PREPROCEDURE EVALUATION
"Anesthesia Pre-Procedure Evaluation    Patient: Manuela Gruber   MRN: [id number] : 1995        Procedure :           Past Medical History:   Diagnosis Date    Bipolar disorder (H)       History reviewed. No pertinent surgical history.   No Known Allergies   Social History     Tobacco Use    Smoking status: Never     Passive exposure: Current    Smokeless tobacco: Never   Substance Use Topics    Alcohol use: Not Currently     Comment: not during pregnancy      Wt Readings from Last 1 Encounters:   25 83.2 kg (183 lb 6.4 oz)        Anesthesia Evaluation            ROS/MED HX  ENT/Pulmonary:       Neurologic:       Cardiovascular:    (-) PIH   METS/Exercise Tolerance:     Hematologic:     (+)      anemia,          Musculoskeletal:       GI/Hepatic:       Renal/Genitourinary:       Endo:     (+)                  gestational diabetes and Diet- controlled,    Psychiatric/Substance Use:     (+) psychiatric history bipolar       Infectious Disease:       Malignancy:       Other:               OUTSIDE LABS:  CBC:   Lab Results   Component Value Date    WBC 10.8 2025    WBC 11.6 (H) 01/15/2025    HGB 8.1 (L) 2025    HGB 8.1 (L) 2025    HCT 25.6 (L) 2025    HCT 29.1 (L) 01/15/2025     2025     01/15/2025     BMP:   Lab Results   Component Value Date    GLC 86 2025    GLC 98 2025     COAGS: No results found for: \"PTT\", \"INR\", \"FIBR\"  POC: No results found for: \"BGM\", \"HCG\", \"HCGS\"  HEPATIC: No results found for: \"ALBUMIN\", \"PROTTOTAL\", \"ALT\", \"AST\", \"GGT\", \"ALKPHOS\", \"BILITOTAL\", \"BILIDIRECT\", \"EVANGELINA\"  OTHER:   Lab Results   Component Value Date    A1C 4.9 2025       Anesthesia Plan    ASA Status:  3       Anesthesia Type: Epidural.              Consents    Anesthesia Plan(s) and associated risks, benefits, and realistic alternatives discussed. Questions answered and patient/representative(s) expressed understanding.     - Discussed:     - Discussed " with:  Patient            Postoperative Care            Comments:               Ralph Mathis MD    I have reviewed the pertinent notes and labs in the chart from the past 30 days and (re)examined the patient.  Any updates or changes from those notes are reflected in this note.    Clinically Significant Risk Factors

## 2025-02-23 NOTE — PROVIDER NOTIFICATION
Herlinda Fields CNM called and notified of writer paged Tete CNM at 1740 and writer wanted to let CNM's know that pitocin was not increased at 1740 due to tachysystole seen 6085-1165. Tachysystole not seen from 5415-7271 and writer increased pitocin to 6 mU. Herlinda reports she will be heading this way to meet Manuela soon.

## 2025-02-23 NOTE — PROGRESS NOTES
RN notified CNM that patient has had 2 elevated blood pressures.    First elevated blood pressure was at 1309: 143/88, next at 1618: 140/71.  (Does not qualify for gestational hypertension as not yet 4 hours apart)  No severe features.    Labs ordered including CMP, CBC,  random protein (protein creatinine ratio).    Patient denies headache, visual changes,epigastric pain.  Pt is having occasional nausea/vomiting in labor.  Patient has significant swelling in her feet and shins.  Slight swelling in hands, not noticeable and face.  +3/+3 pretibial edema bilaterally, non-reactive DTRs/no clonus.  Urine output has improved.

## 2025-02-23 NOTE — PROGRESS NOTES
"Labor Progress Note:    Patient Name:  Manuela Gruber  :      1995  MRN:      [id number]    Assessment:    - Manuela is a 29 year old  at 37+2 weeks gestation here for and induction of labor secondary to UVV (laminar flow), mild polyhydramnios (AUGUSTUS 28), GDM A1   -Bipolar disorder (stable on Abilify)  -GBS Neg  -O POS  -Anemia (hemoglobin on admit 8.1, and unchanged now S/P IV Venofer infusions x , 2025).  -Taking low-dose ASA for preeclampsia risk  -FHR: Cat 1  -S/P 6 doses of oral Cytotec given   -Pitocin off for an hour for receptor reset starting at 0317. Currently back up to 6 erasmo-units  -Medications for therapeutic rest given at 0227  -SROM at 2200 for large amount of clear fluid   - Stable maternal and fetal well-being    Plan:   -Continuous monitoring  -Increase Pitocin per protocol  -Continue standard CNM support & surveillance management.   -Consider SVE to assess for a fore bag and progress  -Report to Tete Mahan CNM at 0600 with assumption of care by her at that time.    -Collection of cord blood following delivery  -Active management of the third stage    Subjective:  Manuela is sleeping soundly following medication for therapeutic rest. Junito was awake at bedside and informed Tete would be back on at 0600 this morning.     Objective:  /55 (BP Location: Left arm, Patient Position: Left side, Cuff Size: Adult Regular)   Temp 98.7  F (37.1  C) (Oral)   Resp 20   Ht 1.499 m (4' 11\")   Wt 83.2 kg (183 lb 6.4 oz)   LMP  (LMP Unknown)   SpO2 98%   BMI 37.04 kg/m      FHR: 130 baseline, moderate variability, 15 X 15 accels present, possible variable decelerations around 0302 with likely incorrect tracing of the lise at 0530. Changed to external monitor for now.   Contractions: q 1-6 minutes,  duration. Relaxation time: adequate. Excessive uterine activity: absent.   Cervix: deferred    10 minutes on the date of the encounter doing patient visit and " documentation    Herlinad Fields CNM  Date:  2/23/2025  Time:  5:28 AM

## 2025-02-23 NOTE — PROGRESS NOTES
"Labor Progress Note:       Assessment:  - Manuela is a 29 year old  at 37w2d gestation here for and induction of labor secondary to UVV (laminar flow), mild polyhydramnios (AUGUSTUS 28), GDM A1   -Bipolar disorder (stable on Abilify)  -GBS Neg  -O POS  -Anemia (hemoglobin on admit 8.1,  S/P IV Venofer infusions x 3-- 2025, 2025 and current infusion).  - low-dose ASA for preeclampsia risk  -FHR: Cat 2 (occassional variable, 1 late decel at 1515, moderate variability present normal)  -Pitocin shut off for a Pitocin break, (max Pitocin 20 mU/min)  -SROM at 2200 2025, clear fluid (17 hrs)  -Stable maternal and fetal well-being  -Epidural anesthesia  in place  -Son cath in place  -IUPC in place  -no recent cervical progress, inadequate labor, inadequate MVU's     Plan:     -IV Pitocin off for 2-hour break. --Pitocin has been on for 24 hours other than one break between 315 and 4:15 AM.  -Routine CNM support & management  -Epidural anesthesia in place--anesthesia came for an epidural bolus with good result  -Continuous EFM per protocol, external for FHR, IUPC in place  -Son catheter in place  -Encourage rest and maternal position changes as necessary.    -pt plans cord blood and cord tissue collection/banking--collected labs  -Plan collection of cord blood for O Pos blood type per protocol.  -3 doses of IV Venofer complete  -balance and consider AMTSL if prolonged pitocin use  -Anticipate progress and         Subjective:   Manuela is resting comfortably now after epidural bolus from anesthesia.  Considered doing side-lying release but patient said she would rather have a Pitocin break and a rest right now.  Son catheter in place  Patient's parents were at the bedside now have left, Partner Junito supportive at bedside.   Discussed with patient that we are trying to keep her comfortable, watching her temperature, watching baby's heart rate pattern, that she does not have \"adequate labor\" yet and that " "we are trying to get her into labor but that her Pitocin level was quite high with no cervical change.  Suggested possible Pitocin break as an option that might help clear the receptors and start again.  Patient is somewhat warm to the touch but does not have a fever.      Objective:  Pitocin off (max 20 milliunits/min)   afebrile, although patient feels warm to the touch.    Emesis x 1      /63   Temp 99.4  F (37.4  C) (Oral)   Resp 20   Ht 1.499 m (4' 11\")   Wt 83.2 kg (183 lb 6.4 oz)   LMP  (LMP Unknown)   SpO2 99%   BMI 37.04 kg/m      Membrane Status: 2200 on 2/22/2025 (17 hours)     FHR: Baseline: 140 bpm, Variability: Moderate (6 - 25 bpm), Accelerations: Present   and Decelerations: absent now (1 late decel at 1515)     Uterine contractions:  (pitocin off now) Frequency: Every 2-6 minutes, Duration: 60-90 seconds. adequate soft resting tone.  Very minimal MVU's now.  (Prior to turning Pitocin off, UC's Q 3-4 lasting 70-90 sec, MVU's 140-165, not in adequate labor)     SVE: Unchanged.  5/90%, medium, mid, vertex -1 fixed        Tete ESTEVESM, APRN     Total time spent on the date of this encounter doing: chart review, review of test results, patient visit, the physical exam & procedure, education, counseling, developing this plan of care, and documenting = 30 minutes     February 23, 2025 3:45 PM  "

## 2025-02-23 NOTE — PROVIDER NOTIFICATION
Tete bedside for evaluation, notified of pitocin decreased to 8mU due to too short of resting time between ctx, writer continuing to monitor.

## 2025-02-23 NOTE — PROGRESS NOTES
Labor Progress Note:       Assessment:  - Maneula is a 29 year old  at 37w2d gestation here for and induction of labor secondary to UVV (laminar flow), mild polyhydramnios (AUGUSTUS 28), GDM A1   -Bipolar disorder (stable on Abilify)  -GBS Neg  -O POS  -Anemia (hemoglobin on admit 8.1, and unchanged now S/P IV Venofer infusions x , 2025).  - low-dose ASA for preeclampsia risk  -FHR: Cat 1  -S/P 6 doses of oral Cytotec given   -Pitocin off for an hour 4075-9400. Currently back up to 10 mU/min (max 12 mU/min)  -Medications for therapeutic rest given at 0227 (Atarax 100 mg PO, morphine 10 mg IM)  -SROM at 2200 for large amount of clear fluid (11 hrs)  - Stable maternal and fetal well-being  -Epidural anesthesia           Plan:    IV Pitocin per low dose protocol--titrate to achieve effective UC pattern  -Routine CNM support & management  -Epidural anesthesia in place  -Continuous EFM per protocol, IUPC when patient comfortable  -Son catheter when patient comfortable  -Encourage rest.  Will continue position changes per protocol  -pt plans cord blood and cord tissue collection/banking--collect labs now for [facility] (required within 24 hours of delivery)  -Plan collection of cord blood for O Pos blood type per protocol.  -balance and consider AMTSL if prolonged pitocin use  -Ordered 3rd dose of IV Venofer this a.m. (checked with  inpatient pharmacy regarding timing)  -Patient is open to epidural for pain relief   -Anticipate progress and      Subjective:   Manuela tried nitrous oxide for short time but this was not helping enough so then requested an epidural.  Epidural is now in place and starting to work for patient.  Patient agrees to an IUPC and Son catheter when comfortable.  Will discuss considering ISC when necessary.  Last emptied her bladder at about 07 30 so will place Son catheter uncomfortable  Partner Junito is supportive at bedside.       Objective:  Pitocin at 10 milliunits/min  /55  "(BP Location: Left arm, Patient Position: Left side, Cuff Size: Adult Regular)   Temp 98.8  F (37.1  C) (Oral)   Resp 16   Ht 1.499 m (4' 11\")   Wt 83.2 kg (183 lb 6.4 oz)   LMP  (LMP Unknown)   SpO2 97%   BMI 37.04 kg/m      Membrane Status: SROM clear, since 2/22/2025 2200 (11 hours)     FHR: Baseline: 130 bpm, Variability: Moderate (6 - 25 bpm), 15 x 15 accelerations: Present and Decelerations: Absent     Uterine contractions:  Frequency: Every 2.5-4 minutes, Duration: 60-90 seconds.  Adequate soft resting tone.     SVE: Prior to epidural: 3/90%/soft/mid/vertex 0        Tete Mahan CNM, APRN     Total time spent on the date of this encounter doing: chart review, review of test results, patient visit, the physical exam & procedure, education, counseling, developing this plan of care, and documenting = 60 minutes     February 23, 2025 8:57 AM  "

## 2025-02-23 NOTE — PROGRESS NOTES
"Labor Progress Note:    Patient Name:  Manuela Gruber  :      1995  MRN:      [id number]    Assessment:    - Manuela is a 29 year old  at 37+1 weeks gestation here for and induction of labor secondary to UVV (laminar flow), mild polyhydramnios (AUGUSTUS 28), GDM A1   -Bipolar disorder (stable on Abilify)  -GBS Neg  -O POS  -Anemia (hemoglobin on admit 8.1, and unchanged now S/P IV Venofer infusions x , 2025).   -Taking low-dose ASA for preeclampsia risk  -FHR: Cat 1  -S/P 6 doses of oral Cytotec given   -Pitocin currently at 10 erasmo-units  -SROM at 2200 for large amount of clear fluid   - Stable maternal and fetal well-being    Plan:   -Continuous monitoring  -Increase Pitocin per protocol  -Continue standard CNM support & surveillance management. Encourage rest as able or upright positioning if desired  -Cervical exam deferred due to SROM  -Iron infusion at 24-hours from last dose  -Anticipate progress and active labor  -Reevaluate progress as clinically indicated.   -Collection of cord blood following delivery  -Active management of the third stage    Subjective:  Manuela is still comfortable with contractions. She is starting to have some mild pelvic discomfort since SROM. Reviewed increased risk of infection with ROM so we will defer cervical exam at this time. She is agreeable to this POC. Staying hydrated but has not eaten much. Junito now awake and supportive.     Objective:  /58   Temp 99.2  F (37.3  C) (Oral)   Resp 18   Ht 1.499 m (4' 11\")   Wt 83.2 kg (183 lb 6.4 oz)   LMP  (LMP Unknown)   SpO2 98%   BMI 37.04 kg/m      FHR: 135 baseline, moderate variability, 15 X 15 accels present, no decels present.   Contractions: q 1-4 minutes, 20-70 duration. Soft resting tone. Relaxation time:  generally adequate . Excessive uterine activity: absent.   Cervix: deferred      20 minutes on the date of the encounter doing patient visit and documentation    Provider:  Herlinda Fields, " DANIELITO  Date:  2/22/2025  Time:  10:44 PM

## 2025-02-23 NOTE — PROGRESS NOTES
0205: Pt reported pain 6-7/10. Desires SVE and possible intervention.  SVE performed by RN 1/70/-2/med/anterior. CNM updated.  Order for hydroxyzine and morphine for therapeutic rest. Discussed this plan with patient and partner, including: time frame, possible side effects and alternatives. They are agreeable with this plan.  Meds given. Monitoring for response.

## 2025-02-23 NOTE — PROGRESS NOTES
Labor progress note    Patient's parents have left now also patient consents to SVE   SVE: 5/90 with fat ant lip/med/mid vtx -1 fixed  Pitocin from 16 mU/min up to 18 mU/min ([place] units are only 140 per RN)  Urine is dark yellow in Son catheter bag so  IV LR bolus 500 cc /1 hour started  Fetal Heart tones:  mostly Cat I and very Occasional Cat II  Baseline 135 with mod variability present, accels present, decels: had variable decel at 1304  Patient complains of UC pain on left side.  She has pushed the patient controlled access button, head of the bed lowered a bit to help. Will consider calling anesthesia for rebolus if needed.  Pt asks if she can have a Celsius drink (clear, with electrolytes and caffeine.) RN and CNM discussed with patient that we would like her to wait until after her IV Venofer is finished infusing so that it does not look like a reaction if her heart rate goes up because of the caffeine.  Celsius has 200 mg of caffeine per can.  Patient can take sips of clear fluid.    Patient agrees with plan.

## 2025-02-23 NOTE — PROGRESS NOTES
"Labor Progress Note:    Patient Name:  Manuela Gruber  :      1995  MRN:      [id number]    Assessment:    - Manuela is a 29 year old  at 37+1 weeks gestation here for and induction of labor secondary to UVV (laminar flow), mild polyhydramnios (AUGUSTUS 28), GDM A1   -Bipolar disorder (stable on Abilify)  -GBS Neg  -O POS  -Anemia (hemoglobin on admit 8.1, and unchanged now S/P IV Venofer infusions x , 2025). Plan another infusion at the 24 hour claudette.   -Taking low-dose ASA for preeclampsia risk  -FHR: Cat 1  -S/P 6 doses of oral Cytotec given   -Pitocin currently at 10 erasmo-units  - Stable maternal and fetal well-being    Plan:   -Continuous monitoring  -Increase Pitocin per protocol  -Continue standard CNM support & surveillance management. Encourage rest as able or upright positioning if desired  -Cervical exam around midnight to assess progress  -Iron infusion at 24-hours from last dose  -Anticipate progress and labor  -Reevaluate progress in 4-6 hours or sooner as clinically indicated.   -Collection of cord blood following delivery  -Active management of the third stage    Subjective:  Manuela is comfortable with her contractions. Able to rest but reports being woken up by numbness and pain in her hands.  Reviewed that this will improve following delivery and decrease in swelling.  She would like to continue to try to sleep.  PO fluid intake.  Voiding without issue.  Junito sleeping at bedside.    Objective:  /58   Temp 99.2  F (37.3  C) (Oral)   Resp 18   Ht 1.499 m (4' 11\")   Wt 83.2 kg (183 lb 6.4 oz)   LMP  (LMP Unknown)   SpO2 98%   BMI 37.04 kg/m      FHR: 135 baseline, moderate variability, 15 X 15 accels present, no decels present.   Contractions: q 2 minutes, 60 duration, mild to palpation. Soft resting tone. Relaxation time: adequate. Excessive uterine activity: absent.   Cervix: Deferred      20 minutes on the date of the encounter doing patient visit and " documentation    Provider:  Herlinda Fields CNM  Date:  2/22/2025  Time:  8:10 PM

## 2025-02-23 NOTE — PROGRESS NOTES
"Labor Progress Note:       Assessment:  - Manuela is a 29 year old  at 37w2d gestation here for and induction of labor secondary to UVV (laminar flow), mild polyhydramnios (AUGUSTUS 28), GDM A1   -Bipolar disorder (stable on Abilify)  -GBS Neg  -O POS  -Anemia (hemoglobin on admit 8.1, and unchanged now S/P IV Venofer infusions x , 2025).  -Taking low-dose ASA for preeclampsia risk  -FHR: Cat 1  -S/P 6 doses of oral Cytotec given   -Pitocin off for an hour 6901-6437. Currently back up to 8 mU/min (max 12 mU/min)  -Medications for therapeutic rest given at 0227 (Atarax 100 mg PO, morphine 10 mg IM)  -SROM at 2200 for large amount of clear fluid (9 hrs)  - Stable maternal and fetal well-being     Plan:   - IV Pitocin per low dose protocol--titrate to achieve effective UC pattern  -Routine CNM support & management  -Continuous EFM per protocol  -Encourage ambulation, rest, hydration  -Evaluate progress when patient wakes for the day, within an hour  -pt plans cord blood and cord tissue collection/banking--collect labs for [facility] required within 24 hours of delivery  -Plan collection of cord blood for O Pos blood type per protocol.  -balance and consider AMTSL if prolonged pitocin use  -Give 3rd dose of IV Venofer this a.m. (checked with  inpatient pharmacy regarding timing)  -Patient is open to epidural for pain relief   -Anticipate progress and         Subjective:   Checked in with patient at 0630.  But she is sleeping and would like to remain resting for now.  Voiding without issue.  Partner Junito is resting on the couch.  Will check back in within the hour for sterile vaginal exam, plan for the day.      Objective:    Pitocin at 8 milliunits/min    /55 (BP Location: Left arm, Patient Position: Left side, Cuff Size: Adult Regular)   Temp 98.7  F (37.1  C) (Oral)   Resp 20   Ht 1.499 m (4' 11\")   Wt 83.2 kg (183 lb 6.4 oz)   LMP  (LMP Unknown)   SpO2 98%   BMI 37.04 kg/m      Membrane " Status: SROM clear fluid on 2/22/2025 2200 (9 hours)     FHR: Baseline: 130 bpm, Variability: Moderate (6 - 25 bpm), 15 x 15 accelerations: Present and Decelerations: Absent     Uterine contractions:  Frequency: Every 3-6 minutes, Duration: 60-1 20 seconds.  Adequate soft resting tone.     SVE: Deferred        Tete ESTEVESM, APRN     Total time spent on the date of this encounter doing: chart review, review of test results, patient visit, the physical exam & procedure, education, counseling, developing this plan of care, and documenting = 15 minutes     February 23, 2025 7:15 AM

## 2025-02-23 NOTE — PLAN OF CARE
Continue with Pitocin for cervical ripening, currently on 8mu/min. Patient open to an epidural when she becomes more uncomfortable. KARIE Epstein met with patient this morning. Plan to let her rest for a little longer until she feels ready to start her day and then discuss plan of care options, consider sterile vaginal exam.

## 2025-02-23 NOTE — PROGRESS NOTES
Labor Progress Note:       Assessment:  - Manuela is a 29 year old  at 37w2d gestation here for and induction of labor secondary to UVV (laminar flow), mild polyhydramnios (AUGUSTUS 28), GDM A1   -Bipolar disorder (stable on Abilify)  -GBS Neg  -O POS  -Anemia (hemoglobin on admit 8.1,  S/P IV Venofer infusions x 3--now Hgb 8.4 24  - low-dose ASA for preeclampsia risk  -FHR: Cat 1  -Pitocin shut off for a Pitocin break, from 3138-9051. (max Pitocin 20 mU/min)  -SROM at 2200 2025, clear fluid (20 hrs)  -Stable maternal and fetal well-being  -Epidural anesthesia  in place  -Son cath in place  -IUPC and IFSE in place  -inadequate labor  -New diagnosis: Elevated blood pressure (not severe range) does not yet meet criteria for gestational hypertension, however has elevated protein creatinine ratio, all other labs within normal limits, other than anemia hemoglobin 8.4.  -Pitocin restarted at 1745, 2 milliunits/min        Plan:     -IV Pitocin off for 2-hour break. --Pitocin has been on for 24 hours other than one break between 315 and 4:15 AM, off again from 0820-9088.  Restarted at 2 milliunits/min.  -Routine CNM support & management  -Epidural anesthesia in place--anesthesia came for an epidural bolus with good result  -Continuous EFM per protocol, external for FHR, IUPC in place  -Son catheter in place  -I FSE placed, IUPC already in place  -Encourage rest and maternal position changes as necessary.    -pt plans cord blood and cord tissue collection/banking--collected labs  -Plan collection of cord blood for O Pos blood type per protocol.  -3 doses of IV Venofer complete  -balance and consider AMTSL due to prolonged pitocin use  -Anticipate progress and   -Report given to oncoming CNM, Briseida Alexander, at 1800.  She will assume care at that time.        Subjective:   Manuela is resting on her right side.  Discussed recommendation for and patient gave consent for IFSE placement and restart of Pitocin.   "Patient is fairly comfortable now with her epidural in place, however she hits the button when she feels some discomfort and then usually this helps.  May want to consider another bolus in the future if needed.  Son in place with urine output adequate.  Junito resting and then awake and briefly supportive at bedside.   Discussed that Briseida Alexander CNM will be coming on at 6 PM.      Objective:    Pitocin restarted at 2 milliunits/min at 1745  New diagnosis of elevated blood pressures (none in severe range), but not 4 hours apart.  No severe features.  PCR ratio 0.37    /63   Temp (P) 100  F (37.8  C) (Oral)   Resp (P) 20   Ht 1.499 m (4' 11\")   Wt 83.2 kg (183 lb 6.4 oz)   LMP  (LMP Unknown)   SpO2 99%   BMI 37.04 kg/m      Membrane Status: SROM on 2/22/25 @ 2200 clear fluid (20 hours)     FHR: Category 1, baseline: 140 bpm, Variability: Moderate (6 - 25 bpm), 15 x 15 accelerations: Present and Decelerations: Absent     Uterine contractions:  Frequency: Every 4-5 minutes, Duration: 60-80 seconds.  Adequate soft resting tone.     SVE: Changed, but Pitocin was off for the last 2 hours, 5/90%/anterior fat lip, medium, mid  vertex -1        Tete ESTEVESM, APRN     Total time spent on the date of this encounter doing: chart review, review of test results, patient visit, the physical exam & procedure, education, counseling, developing this plan of care, and documenting = 30 minutes     February 23, 2025 1800  "

## 2025-02-23 NOTE — PROGRESS NOTES
Labor progress Note    -Pitocin now down to 8 mU/min for adequate resting time.  Will titrate Pitocin to achieve effective UC pattern and continue to increase as able. (Max so far, 12 milliunits/min)  -Placed IUPC   -placed Son catheter  -To start second IV  -Nurse and lab to collect labs for CBR now  -Start IV Venofer 300 mg infusion ASAP  -Corrected incorrect orders for albuterol and added Hemabate back into postpartum hemorrhage protocol as needed as patient does not have any history of asthma.  -Encourage rest for both patient and  as able  Cervix 4/90/vertex 0    Total time with patient:15 minutes

## 2025-02-23 NOTE — PROGRESS NOTES
"Labor Progress Note:       Assessment:  - Manuela is a 29 year old  at 37w2d gestation here for and induction of labor secondary to UVV (laminar flow), mild polyhydramnios (AUGUSTUS 28), GDM A1   -Bipolar disorder (stable on Abilify)  -GBS Neg  -O POS  -Anemia (hemoglobin on admit 8.1,  S/P IV Venofer infusions x 3-- 2025, 2025 and current infusion).  - low-dose ASA for preeclampsia risk  -FHR: Cat 2 (occassional variable, mod variability)  -S/P 6 doses of oral Cytotec given   -Pitocin 14 mU/min   -SROM at 2200 for large amount of clear fluid (14 hrs)  -Stable maternal and fetal well-being  -Epidural anesthesia  -Son cath     Plan:   -IV Pitocin per low dose protocol--titrate to achieve effective UC pattern  -Routine CNM support & management  -Epidural anesthesia in place  -Continuous EFM per protocol, external for FHR, IUPC   -Son catheter in place  -Encourage rest and maternal position changes as necessary.    -pt plans cord blood and cord tissue collection/banking--collected labs at noon, (needs to be within 24 hrs of delivery)  -Plan collection of cord blood for O Pos blood type per protocol.  - IV Venofer (third dose) infusing currently in second IV.  -balance and consider AMTSL if prolonged pitocin use  -Anticipate progress and      Subjective:   Manuela is comfortable with an epidural. Turned to Rt side now, her parents just arrived for a visit so she declines SVE \"I got poked and prodded and would like a rest for a while and a visit with my parents.\"  Son catheter in place  Partner Junito boswell at bedside.       Objective:     Pitocin at 14 milliunits/min    /60   Temp 99.3  F (37.4  C) (Oral)   Resp 20   Ht 1.499 m (4' 11\")   Wt 83.2 kg (183 lb 6.4 oz)   LMP  (LMP Unknown)   SpO2 98%   BMI 37.04 kg/m      Membrane Status: SROM on 2025 at 2200 (14 hours)     FHR: Baseline: 140 bpm, Variability: Moderate (6 - 25 bpm), 15 x 15 accelerations: Present and Decelerations: " Occasional variables present.  1 late decel at 1057 and another at 1137.     Uterine contractions:  Frequency: Every 2.5-3.5 minutes, Duration:  seconds. Adequate soft resting tone. MVU's 130     SVE: Deferred (until visitors leave)        Tete ESTEVESM, APRN     Total time spent on the date of this encounter doing: chart review, review of test results, patient visit, the physical exam & procedure, education, counseling, developing this plan of care, and documenting = 20 minutes     February 23, 2025 1230 p.m.

## 2025-02-23 NOTE — PLAN OF CARE
Problem: Labor  Goal: Stable Fetal Wellbeing  Outcome: Progressing  Intervention: Promote and Monitor Fetal Wellbeing  Recent Flowsheet Documentation  Taken 2/22/2025 2000 by Lenore Hernandez RN  Fetal Wellbeing Promotion:   fetal heart rate monitored   uterine contraction activity assessed   obstetric care provider contacted   Goal Outcome Evaluation:       FHTs have remained in Category I during Pitocin induction thus far.  VSS. Patient able to move independently.  Wireless monitor in use.

## 2025-02-24 PROBLEM — O26.03 EXCESSIVE WEIGHT GAIN IN PREGNANCY IN THIRD TRIMESTER: Status: RESOLVED | Noted: 2025-02-23 | Resolved: 2025-02-24

## 2025-02-24 PROBLEM — O61.0: Status: ACTIVE | Noted: 2025-02-24

## 2025-02-24 PROBLEM — Z37.9 NORMAL LABOR: Status: RESOLVED | Noted: 2025-02-23 | Resolved: 2025-02-24

## 2025-02-24 PROBLEM — Z91.89 AT RISK FOR COMPLICATION OF PREGNANCY: Status: RESOLVED | Noted: 2024-09-30 | Resolved: 2025-02-24

## 2025-02-24 PROBLEM — O16.9 ELEVATED BLOOD PRESSURE AFFECTING PREGNANCY, ANTEPARTUM: Status: RESOLVED | Noted: 2025-02-23 | Resolved: 2025-02-24

## 2025-02-24 PROBLEM — Z34.90 ENCOUNTER FOR INDUCTION OF LABOR: Status: RESOLVED | Noted: 2025-02-21 | Resolved: 2025-02-24

## 2025-02-24 PROBLEM — O40.9XX0 POLYHYDRAMNIOS AFFECTING PREGNANCY: Status: RESOLVED | Noted: 2025-02-10 | Resolved: 2025-02-24

## 2025-02-24 PROBLEM — O28.3 ABNORMAL PREGNANCY US: Status: RESOLVED | Noted: 2025-02-23 | Resolved: 2025-02-24

## 2025-02-24 PROBLEM — D50.8 IRON DEFICIENCY ANEMIA SECONDARY TO INADEQUATE DIETARY IRON INTAKE: Status: ACTIVE | Noted: 2025-01-16

## 2025-02-24 LAB
GLUCOSE BLDC GLUCOMTR-MCNC: 119 MG/DL (ref 70–99)
HGB BLD-MCNC: 7.4 G/DL (ref 11.7–15.7)

## 2025-02-24 PROCEDURE — 85018 HEMOGLOBIN: CPT | Performed by: STUDENT IN AN ORGANIZED HEALTH CARE EDUCATION/TRAINING PROGRAM

## 2025-02-24 PROCEDURE — 250N000011 HC RX IP 250 OP 636: Performed by: STUDENT IN AN ORGANIZED HEALTH CARE EDUCATION/TRAINING PROGRAM

## 2025-02-24 PROCEDURE — 999N000249 HC STATISTIC C-SECTION ON UNIT

## 2025-02-24 PROCEDURE — 250N000013 HC RX MED GY IP 250 OP 250 PS 637: Performed by: MIDWIFE

## 2025-02-24 PROCEDURE — 36415 COLL VENOUS BLD VENIPUNCTURE: CPT | Performed by: STUDENT IN AN ORGANIZED HEALTH CARE EDUCATION/TRAINING PROGRAM

## 2025-02-24 PROCEDURE — 250N000013 HC RX MED GY IP 250 OP 250 PS 637: Performed by: STUDENT IN AN ORGANIZED HEALTH CARE EDUCATION/TRAINING PROGRAM

## 2025-02-24 PROCEDURE — 250N000011 HC RX IP 250 OP 636: Performed by: OBSTETRICS & GYNECOLOGY

## 2025-02-24 PROCEDURE — 258N000003 HC RX IP 258 OP 636: Performed by: OBSTETRICS & GYNECOLOGY

## 2025-02-24 PROCEDURE — 120N000001 HC R&B MED SURG/OB

## 2025-02-24 RX ORDER — AMOXICILLIN 250 MG
1 CAPSULE ORAL 2 TIMES DAILY
Status: DISCONTINUED | OUTPATIENT
Start: 2025-02-24 | End: 2025-02-26 | Stop reason: HOSPADM

## 2025-02-24 RX ORDER — ACETAMINOPHEN 325 MG/1
975 TABLET ORAL EVERY 6 HOURS
Status: DISCONTINUED | OUTPATIENT
Start: 2025-02-24 | End: 2025-02-26 | Stop reason: HOSPADM

## 2025-02-24 RX ORDER — MEPERIDINE HYDROCHLORIDE 25 MG/ML
25 INJECTION INTRAMUSCULAR; INTRAVENOUS; SUBCUTANEOUS
Status: DISCONTINUED | OUTPATIENT
Start: 2025-02-24 | End: 2025-02-26 | Stop reason: HOSPADM

## 2025-02-24 RX ORDER — TRANEXAMIC ACID 10 MG/ML
1 INJECTION, SOLUTION INTRAVENOUS EVERY 30 MIN PRN
Status: DISCONTINUED | OUTPATIENT
Start: 2025-02-24 | End: 2025-02-26 | Stop reason: HOSPADM

## 2025-02-24 RX ORDER — DEXAMETHASONE SODIUM PHOSPHATE 4 MG/ML
4 INJECTION, SOLUTION INTRA-ARTICULAR; INTRALESIONAL; INTRAMUSCULAR; INTRAVENOUS; SOFT TISSUE
OUTPATIENT
Start: 2025-02-24

## 2025-02-24 RX ORDER — KETOROLAC TROMETHAMINE 30 MG/ML
15 INJECTION, SOLUTION INTRAMUSCULAR; INTRAVENOUS EVERY 6 HOURS
Status: COMPLETED | OUTPATIENT
Start: 2025-02-24 | End: 2025-02-24

## 2025-02-24 RX ORDER — BISACODYL 10 MG
10 SUPPOSITORY, RECTAL RECTAL DAILY PRN
Status: DISCONTINUED | OUTPATIENT
Start: 2025-02-26 | End: 2025-02-26 | Stop reason: HOSPADM

## 2025-02-24 RX ORDER — ONDANSETRON 4 MG/1
4 TABLET, ORALLY DISINTEGRATING ORAL EVERY 30 MIN PRN
OUTPATIENT
Start: 2025-02-24

## 2025-02-24 RX ORDER — SODIUM PHOSPHATE,MONO-DIBASIC 19G-7G/118
1 ENEMA (ML) RECTAL DAILY PRN
Status: DISCONTINUED | OUTPATIENT
Start: 2025-02-26 | End: 2025-02-26 | Stop reason: HOSPADM

## 2025-02-24 RX ORDER — NALOXONE HYDROCHLORIDE 0.4 MG/ML
0.1 INJECTION, SOLUTION INTRAMUSCULAR; INTRAVENOUS; SUBCUTANEOUS
OUTPATIENT
Start: 2025-02-24

## 2025-02-24 RX ORDER — NICOTINE POLACRILEX 4 MG
15-30 LOZENGE BUCCAL
Status: DISCONTINUED | OUTPATIENT
Start: 2025-02-24 | End: 2025-02-26 | Stop reason: HOSPADM

## 2025-02-24 RX ORDER — DIPHENHYDRAMINE HCL 25 MG
25 CAPSULE ORAL EVERY 6 HOURS PRN
Status: DISCONTINUED | OUTPATIENT
Start: 2025-02-24 | End: 2025-02-26 | Stop reason: HOSPADM

## 2025-02-24 RX ORDER — IBUPROFEN 800 MG/1
800 TABLET, FILM COATED ORAL EVERY 6 HOURS
Status: DISCONTINUED | OUTPATIENT
Start: 2025-02-25 | End: 2025-02-26 | Stop reason: HOSPADM

## 2025-02-24 RX ORDER — DEXTROSE, SODIUM CHLORIDE, SODIUM LACTATE, POTASSIUM CHLORIDE, AND CALCIUM CHLORIDE 5; .6; .31; .03; .02 G/100ML; G/100ML; G/100ML; G/100ML; G/100ML
INJECTION, SOLUTION INTRAVENOUS CONTINUOUS
Status: DISCONTINUED | OUTPATIENT
Start: 2025-02-24 | End: 2025-02-26 | Stop reason: HOSPADM

## 2025-02-24 RX ORDER — METHYLERGONOVINE MALEATE 0.2 MG/ML
200 INJECTION INTRAVENOUS
Status: DISCONTINUED | OUTPATIENT
Start: 2025-02-24 | End: 2025-02-26 | Stop reason: HOSPADM

## 2025-02-24 RX ORDER — ONDANSETRON 2 MG/ML
4 INJECTION INTRAMUSCULAR; INTRAVENOUS EVERY 6 HOURS PRN
Status: DISCONTINUED | OUTPATIENT
Start: 2025-02-24 | End: 2025-02-24

## 2025-02-24 RX ORDER — MISOPROSTOL 200 UG/1
800 TABLET ORAL
Status: DISCONTINUED | OUTPATIENT
Start: 2025-02-24 | End: 2025-02-26 | Stop reason: HOSPADM

## 2025-02-24 RX ORDER — DEXTROSE MONOHYDRATE 25 G/50ML
25-50 INJECTION, SOLUTION INTRAVENOUS
Status: DISCONTINUED | OUTPATIENT
Start: 2025-02-24 | End: 2025-02-26 | Stop reason: HOSPADM

## 2025-02-24 RX ORDER — SODIUM CHLORIDE, SODIUM LACTATE, POTASSIUM CHLORIDE, CALCIUM CHLORIDE 600; 310; 30; 20 MG/100ML; MG/100ML; MG/100ML; MG/100ML
INJECTION, SOLUTION INTRAVENOUS CONTINUOUS
OUTPATIENT
Start: 2025-02-24

## 2025-02-24 RX ORDER — HYDROCORTISONE 25 MG/G
CREAM TOPICAL 3 TIMES DAILY PRN
Status: DISCONTINUED | OUTPATIENT
Start: 2025-02-24 | End: 2025-02-26 | Stop reason: HOSPADM

## 2025-02-24 RX ORDER — HYDROMORPHONE HCL IN WATER/PF 6 MG/30 ML
0.2 PATIENT CONTROLLED ANALGESIA SYRINGE INTRAVENOUS EVERY 5 MIN PRN
OUTPATIENT
Start: 2025-02-24

## 2025-02-24 RX ORDER — METHYLPREDNISOLONE SODIUM SUCCINATE 40 MG/ML
40 INJECTION INTRAMUSCULAR; INTRAVENOUS
Status: DISCONTINUED | OUTPATIENT
Start: 2025-02-24 | End: 2025-02-26 | Stop reason: HOSPADM

## 2025-02-24 RX ORDER — DIPHENHYDRAMINE HYDROCHLORIDE 50 MG/ML
50 INJECTION, SOLUTION INTRAMUSCULAR; INTRAVENOUS
Status: DISCONTINUED | OUTPATIENT
Start: 2025-02-24 | End: 2025-02-26 | Stop reason: HOSPADM

## 2025-02-24 RX ORDER — FENTANYL CITRATE 50 UG/ML
50 INJECTION, SOLUTION INTRAMUSCULAR; INTRAVENOUS EVERY 5 MIN PRN
OUTPATIENT
Start: 2025-02-24

## 2025-02-24 RX ORDER — ALBUTEROL SULFATE 90 UG/1
1-2 INHALANT RESPIRATORY (INHALATION)
Status: DISCONTINUED | OUTPATIENT
Start: 2025-02-24 | End: 2025-02-26 | Stop reason: HOSPADM

## 2025-02-24 RX ORDER — SIMETHICONE 80 MG
80 TABLET,CHEWABLE ORAL 4 TIMES DAILY PRN
Status: DISCONTINUED | OUTPATIENT
Start: 2025-02-24 | End: 2025-02-26 | Stop reason: HOSPADM

## 2025-02-24 RX ORDER — AMOXICILLIN 250 MG
2 CAPSULE ORAL 2 TIMES DAILY
Status: DISCONTINUED | OUTPATIENT
Start: 2025-02-24 | End: 2025-02-26 | Stop reason: HOSPADM

## 2025-02-24 RX ORDER — HYDROMORPHONE HCL IN WATER/PF 6 MG/30 ML
0.4 PATIENT CONTROLLED ANALGESIA SYRINGE INTRAVENOUS EVERY 5 MIN PRN
OUTPATIENT
Start: 2025-02-24

## 2025-02-24 RX ORDER — LOPERAMIDE HYDROCHLORIDE 2 MG/1
2 CAPSULE ORAL
Status: DISCONTINUED | OUTPATIENT
Start: 2025-02-24 | End: 2025-02-26 | Stop reason: HOSPADM

## 2025-02-24 RX ORDER — LOPERAMIDE HYDROCHLORIDE 2 MG/1
4 CAPSULE ORAL
Status: DISCONTINUED | OUTPATIENT
Start: 2025-02-24 | End: 2025-02-26 | Stop reason: HOSPADM

## 2025-02-24 RX ORDER — LIDOCAINE 40 MG/G
CREAM TOPICAL
Status: DISCONTINUED | OUTPATIENT
Start: 2025-02-24 | End: 2025-02-26 | Stop reason: HOSPADM

## 2025-02-24 RX ORDER — DIPHENHYDRAMINE HYDROCHLORIDE 50 MG/ML
25 INJECTION, SOLUTION INTRAMUSCULAR; INTRAVENOUS EVERY 6 HOURS PRN
Status: DISCONTINUED | OUTPATIENT
Start: 2025-02-24 | End: 2025-02-26 | Stop reason: HOSPADM

## 2025-02-24 RX ORDER — ONDANSETRON 2 MG/ML
4 INJECTION INTRAMUSCULAR; INTRAVENOUS EVERY 30 MIN PRN
OUTPATIENT
Start: 2025-02-24

## 2025-02-24 RX ORDER — FENTANYL CITRATE 50 UG/ML
25 INJECTION, SOLUTION INTRAMUSCULAR; INTRAVENOUS EVERY 5 MIN PRN
OUTPATIENT
Start: 2025-02-24

## 2025-02-24 RX ORDER — OXYTOCIN 10 [USP'U]/ML
10 INJECTION, SOLUTION INTRAMUSCULAR; INTRAVENOUS
Status: DISCONTINUED | OUTPATIENT
Start: 2025-02-24 | End: 2025-02-26 | Stop reason: HOSPADM

## 2025-02-24 RX ORDER — OXYTOCIN/0.9 % SODIUM CHLORIDE 30/500 ML
340 PLASTIC BAG, INJECTION (ML) INTRAVENOUS CONTINUOUS PRN
Status: DISCONTINUED | OUTPATIENT
Start: 2025-02-24 | End: 2025-02-26 | Stop reason: HOSPADM

## 2025-02-24 RX ORDER — CARBOPROST TROMETHAMINE 250 UG/ML
250 INJECTION, SOLUTION INTRAMUSCULAR
Status: DISCONTINUED | OUTPATIENT
Start: 2025-02-24 | End: 2025-02-26 | Stop reason: HOSPADM

## 2025-02-24 RX ORDER — MISOPROSTOL 200 UG/1
400 TABLET ORAL
Status: DISCONTINUED | OUTPATIENT
Start: 2025-02-24 | End: 2025-02-26 | Stop reason: HOSPADM

## 2025-02-24 RX ORDER — OXYCODONE HYDROCHLORIDE 5 MG/1
5 TABLET ORAL EVERY 4 HOURS PRN
Status: DISCONTINUED | OUTPATIENT
Start: 2025-02-24 | End: 2025-02-26 | Stop reason: HOSPADM

## 2025-02-24 RX ORDER — POLYETHYLENE GLYCOL 3350 17 G/17G
17 POWDER, FOR SOLUTION ORAL DAILY
Status: DISCONTINUED | OUTPATIENT
Start: 2025-02-24 | End: 2025-02-26 | Stop reason: HOSPADM

## 2025-02-24 RX ORDER — DIPHENHYDRAMINE HYDROCHLORIDE 50 MG/ML
25 INJECTION, SOLUTION INTRAMUSCULAR; INTRAVENOUS
Status: DISCONTINUED | OUTPATIENT
Start: 2025-02-24 | End: 2025-02-26 | Stop reason: HOSPADM

## 2025-02-24 RX ORDER — ALBUTEROL SULFATE 0.83 MG/ML
2.5 SOLUTION RESPIRATORY (INHALATION)
Status: DISCONTINUED | OUTPATIENT
Start: 2025-02-24 | End: 2025-02-26 | Stop reason: HOSPADM

## 2025-02-24 RX ADMIN — ARIPIPRAZOLE 5 MG: 5 TABLET ORAL at 10:59

## 2025-02-24 RX ADMIN — ACETAMINOPHEN 975 MG: 325 TABLET ORAL at 11:24

## 2025-02-24 RX ADMIN — KETOROLAC TROMETHAMINE 15 MG: 30 INJECTION, SOLUTION INTRAMUSCULAR at 11:25

## 2025-02-24 RX ADMIN — SODIUM CHLORIDE 25 MG: 9 INJECTION, SOLUTION INTRAVENOUS at 13:58

## 2025-02-24 RX ADMIN — KETOROLAC TROMETHAMINE 15 MG: 30 INJECTION, SOLUTION INTRAMUSCULAR at 05:27

## 2025-02-24 RX ADMIN — NALBUPHINE HYDROCHLORIDE 2.6 MG: 20 INJECTION, SOLUTION INTRAMUSCULAR; INTRAVENOUS; SUBCUTANEOUS at 00:21

## 2025-02-24 RX ADMIN — ACETAMINOPHEN 975 MG: 325 TABLET ORAL at 17:51

## 2025-02-24 RX ADMIN — SODIUM CHLORIDE 975 MG: 0.9 INJECTION, SOLUTION INTRAVENOUS at 16:30

## 2025-02-24 RX ADMIN — ACETAMINOPHEN 975 MG: 325 TABLET ORAL at 05:27

## 2025-02-24 RX ADMIN — KETOROLAC TROMETHAMINE 15 MG: 30 INJECTION, SOLUTION INTRAMUSCULAR at 17:51

## 2025-02-24 RX ADMIN — NALBUPHINE HYDROCHLORIDE 2.6 MG: 20 INJECTION, SOLUTION INTRAMUSCULAR; INTRAVENOUS; SUBCUTANEOUS at 00:51

## 2025-02-24 ASSESSMENT — ACTIVITIES OF DAILY LIVING (ADL)
ADLS_ACUITY_SCORE: 22
ADLS_ACUITY_SCORE: 22
ADLS_ACUITY_SCORE: 26
ADLS_ACUITY_SCORE: 27
ADLS_ACUITY_SCORE: 26
ADLS_ACUITY_SCORE: 27
ADLS_ACUITY_SCORE: 26
ADLS_ACUITY_SCORE: 27
ADLS_ACUITY_SCORE: 26
ADLS_ACUITY_SCORE: 15
ADLS_ACUITY_SCORE: 27
ADLS_ACUITY_SCORE: 26
ADLS_ACUITY_SCORE: 22
ADLS_ACUITY_SCORE: 26
ADLS_ACUITY_SCORE: 15
ADLS_ACUITY_SCORE: 26

## 2025-02-24 NOTE — PLAN OF CARE
Problem: Adult Inpatient Plan of Care  Goal: Plan of Care Review  Description: The Plan of Care Review/Shift note should be completed every shift.  The Outcome Evaluation is a brief statement about your assessment that the patient is improving, declining, or no change.  This information will be displayed automatically on your shift  note.  Outcome: Progressing  Flowsheets (Taken 2025 1205)  Outcome Evaluation: Pain well controlled with scheduled pain meds. Patient declined taking any stool softners until eating more food. Patient educated by nurse on the importance of taking stool softners, pt still declined. Patient up independtly. Fundus firm at U, scant bleeding. Mepliex on incision, CDI. Abdominal binder given. Breastfeeding-lactation consult in. Patient to receive IV iron.  Plan of Care Reviewed With: patient  Overall Patient Progress: improving     Problem: Pain Acute  Goal: Optimal Pain Control and Function  Outcome: Progressing  Intervention: Develop Pain Management Plan  Recent Flowsheet Documentation  Taken 2025 1125 by Deborah Cruz RN  Pain Management Interventions: medication (see MAR)  Taken 2025 1124 by Deborah Cruz RN  Pain Management Interventions: medication (see MAR)  Intervention: Prevent or Manage Pain  Recent Flowsheet Documentation  Taken 2025 0900 by Deborah Cruz RN  Bowel Elimination Promotion:   ambulation promoted   adequate fluid intake promoted     Problem: Postpartum ( Delivery)  Goal: Effective Bowel Elimination  Outcome: Progressing  Intervention: Enhance Bowel Motility and Elimination  Recent Flowsheet Documentation  Taken 2025 0900 by Deborah Cruz RN  Bowel Motility Enhancement:   ambulation promoted   fluid intake encouraged   oral intake encouraged  Bowel Elimination Promotion:   ambulation promoted   adequate fluid intake promoted  Goal: Fluid and Electrolyte Balance  Outcome: Progressing  Goal: Effective Urinary  Elimination  Outcome: Progressing  Intervention: Monitor and Manage Urinary Retention  Recent Flowsheet Documentation  Taken 2/24/2025 0900 by Deborah Cruz, RN  Urinary Elimination Promotion: frequent voiding encouraged   Goal Outcome Evaluation:      Plan of Care Reviewed With: patient    Overall Patient Progress: improvingOverall Patient Progress: improving    Outcome Evaluation: Pain well controlled with scheduled pain meds. Patient declined taking any stool softners until eating more food. Patient educated by nurse on the importance of taking stool softners, pt still declined. Patient up independtly. Fundus firm at U, scant bleeding. Mepliex on incision, CDI. Abdominal binder given. Breastfeeding-lactation consult in. Patient to receive IV iron.

## 2025-02-24 NOTE — ANESTHESIA POSTPROCEDURE EVALUATION
Patient: Manuela Gruber    Procedure: Procedure(s):   SECTION       Anesthesia Type:  Epidural    Note:  Disposition: Inpatient   Postop Pain Control: Uneventful            Sign Out: Well controlled pain   PONV: No   Neuro/Psych: Uneventful            Sign Out: Acceptable/Baseline neuro status   Airway/Respiratory: Uneventful            Sign Out: Acceptable/Baseline resp. status   CV/Hemodynamics: Uneventful            Sign Out: Acceptable CV status; No obvious hypovolemia; No obvious fluid overload   Other NRE: NONE   DID A NON-ROUTINE EVENT OCCUR? No           Last vitals:  Vitals Value Taken Time   /58 25 0056   Temp 37.2  C (99  F) 25 0015   Pulse 86 25 0041   Resp 18 25 0041   SpO2 96 % 25 0056       Electronically Signed By: Ralph Mathis MD  2025  6:31 AM

## 2025-02-24 NOTE — PROVIDER NOTIFICATION
Briseida TIWARI called and notified looks like cord change has helped with artifact for FSE, writer able to log into computer and temp at 1851 was 99.5, pitocin up to 8 mU now. Briseida notified that writer gave update to charge RN about labor pattern, IV fluid intake, and urine output.

## 2025-02-24 NOTE — PROGRESS NOTES
CNM Courtesy Round:    Patient Name:  Manuela Gruber  :      1995  MRN:      [id number]      Assessment:   Day 1 postpartum, s/p  for failed induction.  CNM courtesy round.    Plan:    -Plan for today: encouraged rest, skin-to-skin, breastfeeding on cue, adequate pain control and limiting visitors.     -Discussed that patient is under physician management with CNM support as needed.     Subjective:  Spoke briefly with Manuela as she, baby, and FOB were all sleeping upon arrival for courtesy round. She is integrating birth experience. Please with her care. Pain is well controlled with current medications.  No questions or concerns at this time.    Objective:  No exam. Courtesy round only.     Provider:  Herlinda Fielsd CNM  Date:  2025  Time:  8:54 AM

## 2025-02-24 NOTE — PROGRESS NOTES
"Labor Progress Note:    Patient Name:  Manuela Gruber  :      1995  MRN:      [id number]    Assessment:    1.  29-year-old  1 para 0 with IUP at 37+2 weeks  2.  Medical induction of labor for UVV and mild polyhydramnios, status post oral Cytotec, now IV Pitocin  3.  Fetal heart rate category 1  4.  Stage I of labor, early phase, dysfunctional labor, IUPC in place  5.  Spontaneous rupture of membranes for clear fluid x 21 hours  6.  Elevated BP w/o dx of GHTN  7.  Elevated urine PC ratio  8.  Severe anemia, asymptomatic  9.  BPAD on medication therapy  10. More than expected weight gain (36#) with pre-gravid BMI 30  11. EFW 94% (per [facility] US 25) with AC 98%  12. GDM A1  13.  At risk for postpartum hemorrhage (IV Pitocin induction of labor, prolonged induction, mild polyhydramnios, severe anemia, potentially macrosomic baby)    Plan:   -Assumed care of at 1800 hrs.  Report received from CN Tete Mahan  -Vital signs per protocol  -FSE/IUPC  -Increase IV low-dose Pitocin per protocol  -Clear liquid diet  -Encourage p.o. fluids  -Recommend IV fluid bolus with lactated Ringer's: 500 mL  -Recommend second IV access due to severe anemia and increased risk for postpartum hemorrhage  -Routine CN support & management.  -Encourage position changes, rest as desired.  -Anticipate progress and NSVB.   -Reevaluate progress in 2-3 hours or sooner with a change in status.    Subjective:  Manuela Gruber is coping well with contractions after epidural placement, encouraged by RN. Good PO fluid intake. Voiding without issue via indwelling Son catheter.     Objective:  /72   Temp 100  F (37.8  C) (Oral)   Resp 20   Ht 1.499 m (4' 11\")   Wt 83.2 kg (183 lb 6.4 oz)   LMP  (LMP Unknown)   SpO2 99%   BMI 37.04 kg/m      FHR: 145, moderate variability, accelerations present, decelerations absent    Uterine contractions: Every 5 to 11 minutes lasting 60 to 160 seconds, MVUs: 15, Pitocin at 4 milliunits "     SVE: Not indicated at this time      35 minutes on the date of the encounter doing chart review, review of test results, interpretation of tests, patient visit, and documentation      Provider: CHACHA Salcido CNM    Date:  2/23/2025  Time:  6:50 PM

## 2025-02-24 NOTE — PROGRESS NOTES
Obstetrics Post-Op Progress Note         Assessment and Plan:    Assessment: Manuela Gruber is a 29 year old  Post-operative day #1 s/p Low transverse primary  section doing well.     L&D complications: Anemia of pregnancy with acute blood loss anemia  GDMA1          Plan:   Ambulation encouraged  Fasting glucose falsely elevated as patient not fasting this AM. Follow up at 6 weeks for 2 hr GTT.  Anemia - iron infusion ordered. Received 2 doses of Venofer in labor.  Anticipate discharge in 2 days           Interval History:   Doing well.  Pain is well-controlled.  No fevers.  Has urinated once, going to work on taking in more orally today, tried last night and was nauseated. Wasn't fasting for glucose this morning. Asymptomatic of anemia.           Physical Exam:   Temp: 98.1  F (36.7  C) Temp src: Oral BP: 97/62 Pulse: 88   Resp: 18 SpO2: 97 % O2 Device: None (Room air)    Vitals:    25   Weight: 83.2 kg (183 lb 6.4 oz)     Vital Signs with Ranges  Temp:  [98.1  F (36.7  C)-100  F (37.8  C)] 98.1  F (36.7  C)  Pulse:  [79-91] 88  Resp:  [16-22] 18  BP: ()/(50-88) 97/62  SpO2:  [89 %-100 %] 97 %  I/O last 3 completed shifts:  In: 3794 [P.O.:350; I.V.:3444]  Out: 1893 [Urine:1025; Emesis/NG output:1; Blood:867]    Uterine fundus is firm, non-tender and at the level of the umbilicus  Incision covered with Mepilex          Data:     Recent Results (from the past 24 hours)   Glucose by meter    Collection Time: 25  2:33 PM   Result Value Ref Range    GLUCOSE BY METER POCT 75 70 - 99 mg/dL   Glucose by meter    Collection Time: 25  3:49 PM   Result Value Ref Range    GLUCOSE BY METER POCT 82 70 - 99 mg/dL   Protein  random urine    Collection Time: 25  4:46 PM   Result Value Ref Range    Total Protein Urine mg/dL 32.0   mg/dL    Total Protein Urine mg/mg Creat 0.37 (H) 0.00 - 0.20 mg/mg Cr    Creatinine Urine mg/dL 85.4 mg/dL   Comprehensive metabolic panel     Collection Time: 02/23/25  4:50 PM   Result Value Ref Range    Sodium 133 (L) 135 - 145 mmol/L    Potassium 4.0 3.4 - 5.3 mmol/L    Carbon Dioxide (CO2) 18 (L) 22 - 29 mmol/L    Anion Gap 14 7 - 15 mmol/L    Urea Nitrogen 8.6 6.0 - 20.0 mg/dL    Creatinine 0.92 0.51 - 0.95 mg/dL    GFR Estimate 86 >60 mL/min/1.73m2    Calcium 8.8 8.8 - 10.4 mg/dL    Chloride 101 98 - 107 mmol/L    Glucose 80 70 - 99 mg/dL    Alkaline Phosphatase 125 40 - 150 U/L    AST 23 0 - 45 U/L    ALT 17 0 - 50 U/L    Protein Total 5.4 (L) 6.4 - 8.3 g/dL    Albumin 2.8 (L) 3.5 - 5.2 g/dL    Bilirubin Total 0.2 <=1.2 mg/dL   CBC with platelets and differential    Collection Time: 02/23/25  4:50 PM   Result Value Ref Range    WBC Count 12.7 (H) 4.0 - 11.0 10e3/uL    RBC Count 3.30 (L) 3.80 - 5.20 10e6/uL    Hemoglobin 8.4 (L) 11.7 - 15.7 g/dL    Hematocrit 27.8 (L) 35.0 - 47.0 %    MCV 84 78 - 100 fL    MCH 25.5 (L) 26.5 - 33.0 pg    MCHC 30.2 (L) 31.5 - 36.5 g/dL    RDW 17.9 (H) 10.0 - 15.0 %    Platelet Count 159 150 - 450 10e3/uL    % Neutrophils 85 %    % Lymphocytes 6 %    % Monocytes 7 %    % Eosinophils 0 %    % Basophils 0 %    % Immature Granulocytes 3 %    NRBCs per 100 WBC 0 <1 /100    Absolute Neutrophils 10.8 (H) 1.6 - 8.3 10e3/uL    Absolute Lymphocytes 0.7 (L) 0.8 - 5.3 10e3/uL    Absolute Monocytes 0.8 0.0 - 1.3 10e3/uL    Absolute Eosinophils 0.0 0.0 - 0.7 10e3/uL    Absolute Basophils 0.0 0.0 - 0.2 10e3/uL    Absolute Immature Granulocytes 0.3 <=0.4 10e3/uL    Absolute NRBCs 0.1 10e3/uL   Protein  random urine    Collection Time: 02/23/25  8:43 PM   Result Value Ref Range    Total Protein Urine mg/dL 91.2   mg/dL    Total Protein Urine mg/mg Creat 1.45 (H) 0.00 - 0.20 mg/mg Cr    Creatinine Urine mg/dL 62.8 mg/dL   Glucose by meter    Collection Time: 02/23/25  8:58 PM   Result Value Ref Range    GLUCOSE BY METER POCT 107 (H) 70 - 99 mg/dL   Prepare red blood cells (unit)    Collection Time: 02/23/25 10:37 PM   Result Value Ref  Range    Blood Component Type Red Blood Cells     Product Code N5013V33     Unit Status Ready for issue     Unit Number [id number]     CROSSMATCH Compatible     CODING SYSTEM [id number]    Prepare red blood cells (unit)    Collection Time: 02/23/25 10:37 PM   Result Value Ref Range    Blood Component Type Red Blood Cells     Product Code L9438L51     Unit Status Ready for issue     Unit Number [id number]     CROSSMATCH Compatible     CODING SYSTEM [id number]    Glucose by meter    Collection Time: 02/24/25  4:01 AM   Result Value Ref Range    GLUCOSE BY METER POCT 119 (H) 70 - 99 mg/dL   Hemoglobin    Collection Time: 02/24/25  6:10 AM   Result Value Ref Range    Hemoglobin 7.4 (L) 11.7 - 15.7 g/dL     Recent Labs   Lab Test 02/21/25  2030   AS Negative     Recent Labs   Lab Test 02/24/25  0610 02/23/25  1650   HGB 7.4* 8.4*     Recent Labs   Lab Test 08/26/24  1233   RUQIGG Positive       Deb Doherty MD

## 2025-02-24 NOTE — PROVIDER NOTIFICATION
Iron infusion test dose started on left forearm IV. IV clean, dry and intact and without pain at insertion site. Patient denies SOB, HA, chest pain, itching, numbness, and tingling throughout body.      02/24/25 1403   Vital Signs   Resp 16   Pulse Rate Source Monitor   Oximeter Heart Rate 78 bpm   /72   BP Location Right arm   BP - Mean 87   Patient Position Semi-Levy's   Cuff Size Adult Regular   Oxygen Therapy   SpO2 99 %   O2 Device None (Room air)

## 2025-02-24 NOTE — PLAN OF CARE
Problem: Labor  Goal: Hemostasis  Outcome: Met  Goal: Stable Fetal Wellbeing  Outcome: Met  Goal: Effective Progression to Delivery  Outcome: Met  Goal: Absence of Infection Signs and Symptoms  Outcome: Met  Goal: Acceptable Pain Control  Outcome: Met  Goal: Normal Uterine Contraction Pattern  Outcome: Met   Goal Outcome Evaluation:                  C section delivery on 02/23/2025 at 2323

## 2025-02-24 NOTE — LACTATION NOTE
"This note was copied from a baby's chart.  Follow up Lactation Visit    Current age:  12 hours old    Gestational age at delivery:  37w2d     Diaper count (last 24 hours):  2 wet 0 soiled      Feedings (last 24 hours):  5 breast.  Two feeds were very good feeds with milk transfer/several swallows    Breastfeeding goals:  6-12 months.  Does want FOB to be involved and offer bottles at night, so \"I can sleep\".  She states she knows she will have to pump at least once at night.     Past breastfeeding experience: none    Labor and Delivery Events that may affect breastfeeding:  ROM 25' 23\",     Maternal health risk that may affect breastfeeding:  Obesity, Anxiety, Depression, GDM, Other:bipolar    Home Pump:  Spectra . Instructed to scan QR code and view video on product website.     Breast assessment:  Breast: Round, Symmetrical, and Soft , Nipple:Everted and Intact    Infant oral assessment:  Oral: Midline, Elevated, Cupped, and Round, Suck: Strong    Feeding assessment:  Education given on hand expression, football hold, asymmetrical latch technique, the importance of optimal positioning for deep, comfortable latch and effective milk transfer, the use of breast compression to assist with milk transfer, listening for swallows, the importance of feeding baby on early hunger cues, and breastfeeding 8-12 times in 24 hours for optimal infant nutrition and hydration as well as for building an optimal milk supply.  We discussed feeding an early term infant and encouraged pumping after breastfeeding, especially if infant does not transfer well at the breast.  Feed back any colostrum expressed, after the next breastfeeding session.      After demonstrating correct latch x 2, Manuela able to latch infant deeply onto the breast.  Infant actively, rhythmically sucking with swallows heard.  Swallows increased when breast compression was used.  Infant able to stay active on each breast approximately 10\" each.  Instructed " to pump after breastfeeding, as able.  Give expressed colostrum to infant after next breastfeeding session, as infant cues.  If infant sleepy at a feeding, end feeding, pump and supplement expressed colostrum.    Visit Summary:  Manuela receptive to education and verbalizes understating of all information given.  She reports infant was actively breastfeeding immediately after birth; however, has been sleepy at breast the past      Feeding plan:  Breast feed at least 8 times in 24 hours, as infant cues.  Wake infant every 3 hours to feed, if infant has not cued to eat.  Use breast compression throughout the feeding to assist with milk transfer.  Offer both breasts each feeding.  Manuela to pump after breastfeeding, as able. Feed infant expressed colostrum, as infant cues.  If infant sleepy at a feeding, end feeding, pump and supplement expressed colostrum.    Education:   []  Feeding Patterns in the First Few Days/second Night   [x] Maternal Risk Factors that Could Affect Milk Supply  [x] Hand Expression  [x] Stages of Milk Production  [] Feeding Cues  [x] LPI Feeding Behaviors  [] LBW Feeding Behaviors  [] Rousing Techniques  [x] Benefits of Skin to Skin  [x] Breastfeeding Positions  [x] Tips to Maintain a Deep Latch               [] Nutritive vs Non-Nutritive Sucking   [x] Gentle Breast Compressions  [] [place] Weight Loss  [] How to Know When Baby is Getting Enough to Eat  [x] Supplementation Methods  [] Type of Supplement  [] Nipple Shield  [] Sore Nipples  [] Pacifier Use  [] Tight Frenulum  [] Breastfeeding Twins  [x] Pumping Plan  [] Breastpump Education-Hospital grade breast pump  [] Engorgement/Reverse Pressure Softening  [x] Inpatient Lactation Support  [] Outpatient Lactation Resources/Follow up    Follow up: MARIN will continue to follow up during hospital stay.  She would like help knowing how to work her Spectra breast pump.

## 2025-02-24 NOTE — ANESTHESIA PROCEDURE NOTES
"Dural puncture epidural Procedure Note    Pre-Procedure   Staff -        Anesthesiologist:  Ralph Mathis MD       Performed By: anesthesiologist       Location: OB       Procedure Start/Stop Times: 2/23/2025 8:30 AM and 2/23/2025 8:35 AM       Pre-Anesthestic Checklist: patient identified, IV checked, risks and benefits discussed, informed consent, monitors and equipment checked, pre-op evaluation, at physician/surgeon's request and post-op pain management  Timeout:       Correct Patient: Yes        Correct Procedure: Yes        Correct Site: Yes        Correct Position: Yes   Procedure Documentation  Procedure: dural puncture epidural         Patient Position: sitting       Skin prep: Chloraprep       Local skin infiltrated with mL of 1% lidocaine.        Insertion Site: L2-3. (midline approach).       Technique: LORT air        BRI at 6.5 cm.       Needle Gauge: 17.        Needle Length (Inches): 3.5        Spinal Needle (gauge): 25        Spinal Needle Length (inches): 5          Catheter threaded easily.           Threaded 12 cm at skin.         # of attempts: 1 and  # of redirects:  1    Assessment/Narrative         Paresthesias: No.       Test dose of 3 mL lidocaine 1.5% w/ 1:200,000 epinephrine at 08:35 CST.         Test dose negative, 3 minutes after injection, for signs of intravascular, subdural, or intrathecal injection.       Insertion/Infusion Method: LORT air       Aspiration negative for Heme or CSF via Epidural Catheter.       CSF fluid: with Spinal needle.CSF fluid removed: with Epidural needle - not with Epidural needle.    Medication(s) Administered   Medication Administration Time: 2/23/2025 8:30 AM      FOR [facility] [place] [place]   Pain Team Contact information: please page the Pain Team Via [employer]. Search \"Pain\". During daytime hours, please page the attending first. At night please page the resident first.      "
"Intrathecal injection Procedure Note    Pre-Procedure   Staff -        Anesthesiologist:  Ralph Mathis MD       Performed By: anesthesiologist       Location: OB       Procedure Start/Stop Times: 2/23/2025 10:55 PM       Pre-Anesthestic Checklist: patient identified, IV checked, risks and benefits discussed, informed consent, monitors and equipment checked, pre-op evaluation, at physician/surgeon's request and post-op pain management  Timeout:       Correct Patient: Yes        Correct Procedure: Yes        Correct Site: Yes        Correct Position: Yes   Procedure Documentation  Procedure: intrathecal injection         Patient Position: sitting       Skin prep: Chloraprep       Insertion Site: L2-3. (midline approach).       Needle Gauge: 25.        Needle Length (Inches): 3.5        Spinal Needle Type: Pencan       Introducer used       Introducer: 20 G       # of attempts: 1 and  # of redirects:  2    Assessment/Narrative         Paresthesias: No.       CSF fluid: clear.       Opening pressure was cmH2O while  Sitting.      Medication(s) Administered   0.75% Hyperbaric Bupivacaine (Intrathecal) - Intrathecal   1.2 mL - 2/23/2025 10:55:00 PM  Fentanyl PF (Intrathecal) - Intrathecal   10 mcg - 2/23/2025 10:55:00 PM  Morphine PF 1 mg/mL (Intrathecal) - Intrathecal   0.15 mg - 2/23/2025 10:55:00 PM  Medication Administration Time: 2/23/2025 10:55 PM      FOR [facility] ([facility]) ONLY:   Pain Team Contact information: please page the Pain Team Via [email or web address]. Search \"Pain\". During daytime hours, please page the attending first. At night please page the resident first.      "
Subjective:      History was provided by the mother. Des Cardozo is a 2 m.o. female who wasbrought in by her mother for this well child visit. No birth history on file. Patient's medications, allergies, past medical, surgical, social and family histories were reviewed and updated as appropriate. Patient's medications, allergies, past medical, surgical, social and family histories were reviewed andupdated as appropriate. There is no immunization history for the selected administration types on file for this patient. Current Issues:  Current concerns on the part of Franciscosmother include:     Dad was sick last week x 3 days- was vomiting  Symptoms started prior Sunday  Gurgling in the stomach  Spit up has increased  Has vomited x 2- 1st one was projectile  No runny nose or congestion  Feels warm  Harder time stooling- is grunting; can smash in diaper; stooling 1x/day  + eating 3-4 oz normally q 2 hours; more recently has decreased 1-2 oz q 2 hours (on Enfamil)  Has been sleeping x 6 hours at night  +UOP  Gaining 26.9 gm/kg    Hearing screen- August 17th    Well Child 2 Month       Objective:     Growth parameters are noted and are appropriate for age. Physical Exam  Vitals reviewed. Constitutional:       General: She is active. Appearance: Normal appearance. She is well-developed. HENT:      Head: Anterior fontanelle is flat. Comments: plagiocephaly     Right Ear: Tympanic membrane, ear canal and external ear normal.      Left Ear: Tympanic membrane, ear canal and external ear normal.      Nose: Nose normal.      Mouth/Throat:      Lips: Pink. Mouth: Mucous membranes are moist.      Dentition: None present. Pharynx: Oropharynx is clear. Uvula midline. Eyes:      General: Red reflex is present bilaterally. Cardiovascular:      Rate and Rhythm: Normal rate and regular rhythm. Pulses: Normal pulses.       Heart sounds: Normal heart sounds, S1 normal and S2 normal.   Pulmonary:
Effort: Pulmonary effort is normal.      Breath sounds: Normal breath sounds and air entry. Abdominal:      Palpations: Abdomen is soft. Tenderness: There is no abdominal tenderness. Genitourinary:     Labia: No rash. Neurological:      Mental Status: She is alert. Assessment/Plan:     1. Encounter for well child visit at 3months of age  Stable;  2 [facility]. Growing well and developmentally appropriate. Continue to feed 3-4 oz q 2 hours, discussed if not feeling well ok to feel less volume more often. Continue to have patient sleep in her own crib. Showed mother how to check rectal temperature. Will come back in 1 week for vaccinations. 2. Plagiocephaly  Stable;  Continue to work on positioning. Will monitor. 3. Failed  hearing screen  Stable;  Patient has an upcoming appointment with audiology. 4. Spitting up infant  Stable;  Discussed symptoms could be viral illness. Mother to monitor closely and alia if any concerns. 5. Difficulty passing stool  Stable;  Discussed stools still appear to be soft. Can try bicycling and putting knees up to stomach PRN. If does not go for 5 days or stool is hard mother to call the office. 5. Immunizations today: none  History of previous adverse reactions to immunizations?no      Follow up:     Return in about 1 week (around 2022).      Zafar Swain, HERBER - CNP
no

## 2025-02-24 NOTE — OP NOTE
NAME:  Manuela Gruber     RECORD # [id number]     [facility] # [id number]    DATE OF SERVICE: 2025     PREOPERATIVE DIAGNOSIS:   IUP at 37 weeks  Failed induction    POSTOPERATIVE DIAGNOSIS: same, s/p pLTCS    PROCEDURE: Low transverse  section      SURGEON:  Suyapa Goldman MD     ASSISTANT: OR staff    ANESTHESIA: spinal    FINDINGS: Normal uterus, tubes and ovaries bilateral. Normal appearance to the adnexae.  Live male infant born.    SPECIMENS: Cord segment    ESTIMATED BLOOD LOSS: Delivery QBL (mL): 867     DRAINS: Son catheter.    COMPLICATIONS: None    DISPOSITION: Stable to recovery.    INDICATION AND CONSENT: Manuela presented for IOL due to umbilical cord varix and mild poly.  She received misoprostol followed by pitocin.  SROM occurred about 24 hours prior to delivery.  She reached a maximum dilation of 5 cm, she did transition into active labor despite attempts to induce for 24 hours after ROM.  Options reviewed and she desired to proceed with .    The risks of the procedure were discussed with the patient including but not exclusive to bleeding and a risk of transfusion, infection, and damage to nearby organs, including the bladder, ureter and bowel.    PROCEDURE:  After obtaining informed consent, the patient was taken to the operating room in stable condition.  After induction of a spinal anesthetic, fetal heart tones were checked and were stable.  She was prepped and draped in the usual sterile fashion and positioned in the dorsal supine position.   A timeout was then performed.      A pfannensteil skin incision was made to the level of the fascia.  The fascia was incised in the midline and extended laterally using cooper scissors. Kocher clamps were applied to the superior aspect of the fascial incision and the underlying rectus muscles were dissected using blunt and sharp dissection with the cooper scissors.  The kocher clamps were then reapplied to the inferior  aspect of the  fascial incision which was similarly sharply dissected from the rectus muscles.  The rectus muscles were  bluntly in the midline.  The peritoneum was identified and entered bluntly with the surgeons finger.  The peritoneal incision was extended laterally using manual traction.  The Osiel-O retractor was placed in the incision.      The bladder was identified and was noted to be away from the intended hysterotomy.  A low transverse incision was created sharply with the scalpel and extended using cephalocaudal traction.  The fetal head was delivered.  The fetus was fully delivered and the cord was clamped and cut.  The  was handed off to the awaiting pediatric team.  Next cord blood was collected for private banking per patient request.  Cord was swabbed with betadine and cord blood then collected.  The placenta was manually and completely delivered intact with a 3 vessel cord.  The uterus was cleared of all clots and debris.  The uterine incision was closed with 0-vicryl in a running locked fashion.  0-monocryl was used to create a second imbricating layer.  The uterine incision was inspected and hemostatic.  There was a small hematoma on the left side of the uterus which was not expanding.  The pericolic gutters were cleared of all clots and debris.  The uterine incision was again inspected and noted to be hemostatic.  The Osiel O-ring retractor was then removed.    The fascia was reapproximated in a running stitch of 0-vicryl.  The subcutaneous tissues were brought together with a running stitch of 3-0 plain.  The skin was closed with 4-0 monocryl in a subcuticular fashion.  The incision was dressed with dermabond.  Patient tolerated this procedure well.  Sponge, lap and needle counts were correct x two.  The patient was taken to the recovery room in stable condition.      Suyapa Goldman MD

## 2025-02-24 NOTE — PROGRESS NOTES
"Labor Progress Note:    Patient Name:  Manuela Gruber  :      1995  MRN:      [id number]    Assessment:    1.  29-year-old  1 para 0 with IUP at 37+2 weeks  2.  Medical induction of labor for UVV and mild polyhydramnios, status post oral Cytotec, now IV Pitocin  3.  Fetal heart rate category 1  4.  Stage I of labor, early phase, dysfunctional labor, IUPC in place  5.  Spontaneous rupture of membranes for clear fluid x 24 hours, afebrile  6.  Elevated BP w/o dx of GHTN  7.  Elevated urine PC ratio  8.  Severe anemia, asymptomatic  9.  BPAD on medication therapy  10. More than expected weight gain (36#) with pre-gravid BMI 30  11. EFW 94% (per [facility] US 25) with AC 98%  12. GDM A1  13.  At risk for postpartum hemorrhage (IV Pitocin induction of labor, prolonged induction, mild polyhydramnios, severe anemia, potentially macrosomic baby)    Plan:   -Vital signs per protocol  -FSE/IUPC in place  -Clear liquid diet  -Encourage p.o. fluids  -Second IV access in place due to severe anemia and increased risk for postpartum hemorrhage  -Routine CNM support & management.  -OB/GYN consult for primary  birth per patient request  -Encourage position changes, rest as desired.  -Care transferred to in-house obstetrician Dr. Goldman for primary  birth per patient request considering rupture of membranes x 24 hours, not in active labor receiving IV Pitocin greater than 24 hours.    Subjective:  Manuela Gruber is coping fairly well with contractions.  MDA has been to the bedside for an epidural bolus with results.  Patient expresses significant hunger and exhaustion after 2-day induction of labor.  Negligible PO fluid intake. Voiding without issue through indwelling Son catheter.  The patient, her spouse and her parents have questions about  birth and are agreeable to an intrapartum OB/GYN consultation.    Objective:  /68   Temp 99.7  F (37.6  C) (Oral)   Resp 20   Ht 1.499 m (4' 11\") "   Wt 83.2 kg (183 lb 6.4 oz)   LMP  (LMP Unknown)   SpO2 99%   BMI 37.04 kg/m      FHR: 145, moderate variability, accelerations present, decelerations absent    Uterine contractions: Every 3 to 5 minutes, Pitocin at 14 milliunits, [place] units: 75    SVE: Patient declined      75 minutes on the date of the encounter doing chart review, review of test results, interpretation of tests, patient visit, documentation, discussion with other provider(s), and discussion with family      Provider: CHACHA Salcido, KARIE    Date:  2/23/2025  Time:  9:46 PM

## 2025-02-24 NOTE — PROGRESS NOTES
Dr. Doherty updated on patient voiding 150 every 2-4 hours since irby removal. MD would like patient to orally hydrate at this time.

## 2025-02-24 NOTE — PROGRESS NOTES
Manuela's vital signs and OB assessments WNL. Fundus is firm, bleeding scant, dressing is CDI. She declined continuous pulse oximetry - writer informed patient that respiratory depression can be a side effect of morphine. Edema in lower extremities and hands. Son removed at 0600, encouraged patient to drink oral fluids and to void by 1000. Fasting BG was taken at 0400 as patient ordered door dash and was planning to eat. BG was 119 - states she had drank 1/2 bottle of Gatorade after surgery. Denies pain. Paperwork was introduced including ROP. Significant other attentive at bedside.     Kaery Maher RN on 2/24/2025 at 5:57 AM

## 2025-02-24 NOTE — LACTATION NOTE
This note was copied from a baby's chart.  Initial Lactation Visit      Visit Summary: This writer met with Manuela to offer lactation support.  She reports attempting to breastfeed infant a couple times after delivery.  She reports the last feeding session, infant fell asleep.      Discussed risk factors of delayed lactogenesis: Prime, , diabetes, and infant is early term.      Encouraged to breastfeed at least 8 times in 24 hours and to watch for rhythmic sucking with occasional swallows.      Instructed to pump after every breastfeeding attempt, to help build and protect milk supply.      Manuela agreed to pump after breastfeeding.  She will call this writer when infant wakes, or before 11:30, to assist with feeding.  More education to follow.          Education:   []  Feeding Patterns in the First Few Days/second Night   [] Maternal Risk Factors that Could Affect Milk Supply  [] Hand Expression   [x] Stages of Milk Production   [] Feeding Cues     [] LPI Feeding Behaviors  [x] LBW Feeding Behaviors  [] Rousing Techniques  [] Benefits of Skin-to-Skin     [] Breastfeeding Positions  [] Tips to Maintain a Deep Latch      [] Nutritive vs Non-Nutritive Sucking   [] Gentle Breast Compressions  []  Weight Loss  [] How to Know When Baby is Getting Enough to Eat  [] Supplementation & methods (including Paced Bottle Feeding)    [] Nipple Shield  [] Sore Nipples  [] Pacifier Use  [] Tight Frenulum    [] Breastfeeding Twins  [] Pumping Plan/Flange fit and comfort  [] Breast pump Education  [] Engorgement/Reverse Pressure Softening  [x] Inpatient Lactation Support  [] Outpatient Lactation Resources/Follow up    Follow up: MARIN will continue to follow up during hospital stay. Manuela to call when infant awakes.

## 2025-02-24 NOTE — PROGRESS NOTES
Patient VSS. Patient denies SOB, HA, chest pain, itching, numbness, and tingling throughout body. Pharmacy called to send rest of IV Iron.

## 2025-02-24 NOTE — ANESTHESIA CARE TRANSFER NOTE
Patient: Manuela Gruber    Procedure: * No procedures listed *       Diagnosis: * No pre-op diagnosis entered *  Diagnosis Additional Information: No value filed.    Anesthesia Type:   Epidural     Note:    Oropharynx: oropharynx clear of all foreign objects  Level of Consciousness: awake  Oxygen Supplementation: room air    Independent Airway: airway patency satisfactory and stable  Dentition: dentition unchanged  Vital Signs Stable: post-procedure vital signs reviewed and stable  Report to RN Given: handoff report given  Patient transferred to: Labor and Delivery    Handoff Report: Identifed the Patient, Identified the Reponsible Provider, Reviewed the pertinent medical history, Discussed the surgical course, Reviewed Intra-OP anesthesia mangement and issues during anesthesia, Set expectations for post-procedure period and Allowed opportunity for questions and acknowledgement of understanding      Vitals:  Vitals Value Taken Time   /78    Temp 98    Pulse 80    Resp 18    SpO2 100        Electronically Signed By: CHACHA Carrington CRNA  February 24, 2025  12:12 AM

## 2025-02-24 NOTE — PROGRESS NOTES
Tete TIWARI bedside for placement of FSE. After SVE orders to restart pitocin. Pitocin restarted at 1748.

## 2025-02-24 NOTE — PROGRESS NOTES
Patient reports drinking apple juice and Gatorade since  last night. Patient had take out around 0400 this morning, blood sugar 119. Patient aware staff needs fasting blood sugar. Patient states drinking Gatorade makes her less nauseous and wants to continue to drink it. Will update MD on rounds.

## 2025-02-24 NOTE — PROGRESS NOTES
Late entry due to patient care. Per bedside discussion with Tete TIWARI do not increase pitocin past 20 mU.

## 2025-02-24 NOTE — CONSULTS
Date: 2025  Time: 9:42 PM    Admission H&P  Manuela Gruber,  1995, MRN [id number]    PCP: Ayala Stephen, [phone]     Code status:  Full Code              Chief Complaint: Encounter for induction of labor       OBSTETRICAL / DATING HISTORY:  Estimated Date of Delivery: Estimated Date of Delivery: Mar 14, 2025  Gestational Age: 37w2d    OB History    Para Term  AB Living   1 0 0 0 0 0   SAB IAB Ectopic Multiple Live Births   0 0 0 0 0      # Outcome Date GA Lbr Brody/2nd Weight Sex Type Anes PTL Lv   1 Current                HPI:    Manuela Gruber is a 29 year old year old at 37w2d weeks. She presented to L&D for planned IOL due to mild polyhydramnios and umbilical cord varix.  She has been managed by her primary CNM team.  She received oral misoprostol.  SROM occurred  at about 2200.  She is on pitocin, has 5 cm since about 1300. Asked by Briseida Alexander to discuss options with patient.    OB Hx: G1    Medical History  Past Medical History:   Diagnosis Date    Bipolar disorder (H)        Surgical History  History reviewed. No pertinent surgical history.    Social History  Social History     Socioeconomic History    Marital status: Patient Declined     Spouse name: Not on file    Number of children: Not on file    Years of education: Not on file    Highest education level: Not on file   Occupational History    Not on file   Tobacco Use    Smoking status: Never     Passive exposure: Current    Smokeless tobacco: Never   Vaping Use    Vaping status: Never Used   Substance and Sexual Activity    Alcohol use: Not Currently     Comment: not during pregnancy    Drug use: Never    Sexual activity: Yes     Partners: Male   Other Topics Concern    Not on file   Social History Narrative    Not on file     Social Drivers of Health     Financial Resource Strain: Low Risk  (2025)    Financial Resource Strain     Within the past 12 months, have you or your family members you live with  been unable to get utilities (heat, electricity) when it was really needed?: No   Food Insecurity: Low Risk  (2/21/2025)    Food Insecurity     Within the past 12 months, did you worry that your food would run out before you got money to buy more?: No     Within the past 12 months, did the food you bought just not last and you didn t have money to get more?: No   Transportation Needs: Low Risk  (2/21/2025)    Transportation Needs     Within the past 12 months, has lack of transportation kept you from medical appointments, getting your medicines, non-medical meetings or appointments, work, or from getting things that you need?: No   Physical Activity: Not on file   Stress: Not on file   Social Connections: Not on file   Interpersonal Safety: Low Risk  (2/21/2025)    Interpersonal Safety     Do you feel physically and emotionally safe where you currently live?: Yes     Within the past 12 months, have you been hit, slapped, kicked or otherwise physically hurt by someone?: No     Within the past 12 months, have you been humiliated or emotionally abused in other ways by your partner or ex-partner?: No   Housing Stability: Low Risk  (2/21/2025)    Housing Stability     Do you have housing? : Yes     Are you worried about losing your housing?: No       No Known Allergies    Medications Prior to Admission   Medication Sig Dispense Refill Last Dose/Taking    ARIPiprazole (ABILIFY) 5 MG tablet Take 1 tablet (5 mg) by mouth daily. 30 tablet 1 2/21/2025    aspirin 81 MG EC tablet Take 1 tablet (81 mg) by mouth daily. 90 tablet 1 2/21/2025    ferrous sulfate 45 MG TBCR CR tablet Take 45 mg by mouth every other day. With vitamin C   Past Week    Prenat MV-Min w/Fe-Folate-DHA (PRENATAL COMPLETE PO) Take 1 tablet by mouth daily.   2/21/2025 Morning    vitamin C (ASCORBIC ACID) 250 MG tablet Take 250 mg by mouth every other day.   Past Week    acetone urine (KETOSTIX) test strip Use daily as directed 50 strip 1     blood glucose (NO  "BRAND SPECIFIED) test strip Use to test blood sugar 4 times daily or as directed. To accompany: Blood Glucose Monitor Brands: per insurance. 100 strip 6     blood glucose monitoring (NO BRAND SPECIFIED) meter device kit Use to test blood sugar 4 times daily or as directed. Preferred blood glucose meter OR supplies to accompany: Blood Glucose Monitor Brands: per insurance. 1 kit 0     thin (NO BRAND SPECIFIED) lancets Use with lanceting device 4 times per day. To accompany: Blood Glucose Monitor Brands: per insurance. 100 each 6        Review of Systems:  Otherwise negative except per HPI    Physical Exam:  Temp:  [98.6  F (37  C)-100  F (37.8  C)] 99.7  F (37.6  C)  Resp:  [16-22] 20  BP: ()/(50-88) 122/68  SpO2:  [89 %-100 %] 99 %    /68   Temp 99.7  F (37.6  C) (Oral)   Resp 20   Ht 1.499 m (4' 11\")   Wt 83.2 kg (183 lb 6.4 oz)   LMP  (LMP Unknown)   SpO2 99%   BMI 37.04 kg/m      General: NAD  CV: RRR  Lungs: clear  Abdomen: soft, gravid    FHT category I    Pertinent Labs  Admission on 02/21/2025   Component Date Value Ref Range Status    WBC Count 02/21/2025 10.8  4.0 - 11.0 10e3/uL Final    RBC Count 02/21/2025 3.08 (L)  3.80 - 5.20 10e6/uL Final    Hemoglobin 02/21/2025 8.1 (L)  11.7 - 15.7 g/dL Final    Hematocrit 02/21/2025 25.6 (L)  35.0 - 47.0 % Final    MCV 02/21/2025 83  78 - 100 fL Final    MCH 02/21/2025 26.3 (L)  26.5 - 33.0 pg Final    MCHC 02/21/2025 31.6  31.5 - 36.5 g/dL Final    RDW 02/21/2025 17.2 (H)  10.0 - 15.0 % Final    Platelet Count 02/21/2025 174  150 - 450 10e3/uL Final    Treponema Antibody Total 02/21/2025 Nonreactive  Nonreactive Final    ABO/RH(D) 02/21/2025 O POS   Final    Antibody Screen 02/21/2025 Negative  Negative Final    SPECIMEN EXPIRATION DATE 02/21/2025 [id number]   Final    Hold Specimen 02/21/2025 [facility]   Final    Hold Specimen 02/21/2025 [facility]   Final    Estimated Average Glucose 02/21/2025 94  <117 mg/dL Final    Hemoglobin A1C 02/21/2025 4.9  <5.7 " % Final    Normal <5.7%   Prediabetes 5.7-6.4%    Diabetes 6.5% or higher     Note: Adopted from ADA consensus guidelines.    GLUCOSE BY METER POCT 02/22/2025 84  70 - 99 mg/dL Final    Hemoglobin 02/22/2025 8.1 (L)  11.7 - 15.7 g/dL Final    GLUCOSE BY METER POCT 02/22/2025 98  70 - 99 mg/dL Final    GLUCOSE BY METER POCT 02/23/2025 86  70 - 99 mg/dL Final    GLUCOSE BY METER POCT 02/23/2025 75  70 - 99 mg/dL Final    GLUCOSE BY METER POCT 02/23/2025 82  70 - 99 mg/dL Final    Sodium 02/23/2025 133 (L)  135 - 145 mmol/L Final    Potassium 02/23/2025 4.0  3.4 - 5.3 mmol/L Final    Carbon Dioxide (CO2) 02/23/2025 18 (L)  22 - 29 mmol/L Final    Anion Gap 02/23/2025 14  7 - 15 mmol/L Final    Urea Nitrogen 02/23/2025 8.6  6.0 - 20.0 mg/dL Final    Creatinine 02/23/2025 0.92  0.51 - 0.95 mg/dL Final    GFR Estimate 02/23/2025 86  >60 mL/min/1.73m2 Final    eGFR calculated using 2021 CKD-EPI equation.    Calcium 02/23/2025 8.8  8.8 - 10.4 mg/dL Final    Chloride 02/23/2025 101  98 - 107 mmol/L Final    Glucose 02/23/2025 80  70 - 99 mg/dL Final    Alkaline Phosphatase 02/23/2025 125  40 - 150 U/L Final    AST 02/23/2025 23  0 - 45 U/L Final    ALT 02/23/2025 17  0 - 50 U/L Final    Protein Total 02/23/2025 5.4 (L)  6.4 - 8.3 g/dL Final    Albumin 02/23/2025 2.8 (L)  3.5 - 5.2 g/dL Final    Bilirubin Total 02/23/2025 0.2  <=1.2 mg/dL Final    Total Protein Urine mg/dL 02/23/2025 32.0    mg/dL Final    The reference ranges have not been established in urine protein. The results should be integrated into the clinical context for interpretation.    Total Protein Urine mg/mg Creat 02/23/2025 0.37 (H)  0.00 - 0.20 mg/mg Cr Final    Creatinine Urine mg/dL 02/23/2025 85.4  mg/dL Final    The reference ranges have not been established in urine creatinine. The results should be integrated into the clinical context for interpretation.    WBC Count 02/23/2025 12.7 (H)  4.0 - 11.0 10e3/uL Final    RBC Count 02/23/2025 3.30 (L)  3.80  - 5.20 10e6/uL Final    Hemoglobin 02/23/2025 8.4 (L)  11.7 - 15.7 g/dL Final    Hematocrit 02/23/2025 27.8 (L)  35.0 - 47.0 % Final    MCV 02/23/2025 84  78 - 100 fL Final    MCH 02/23/2025 25.5 (L)  26.5 - 33.0 pg Final    MCHC 02/23/2025 30.2 (L)  31.5 - 36.5 g/dL Final    RDW 02/23/2025 17.9 (H)  10.0 - 15.0 % Final    Platelet Count 02/23/2025 159  150 - 450 10e3/uL Final    % Neutrophils 02/23/2025 85  % Final    % Lymphocytes 02/23/2025 6  % Final    % Monocytes 02/23/2025 7  % Final    % Eosinophils 02/23/2025 0  % Final    % Basophils 02/23/2025 0  % Final    % Immature Granulocytes 02/23/2025 3  % Final    NRBCs per 100 WBC 02/23/2025 0  <1 /100 Final    Absolute Neutrophils 02/23/2025 10.8 (H)  1.6 - 8.3 10e3/uL Final    Absolute Lymphocytes 02/23/2025 0.7 (L)  0.8 - 5.3 10e3/uL Final    Absolute Monocytes 02/23/2025 0.8  0.0 - 1.3 10e3/uL Final    Absolute Eosinophils 02/23/2025 0.0  0.0 - 0.7 10e3/uL Final    Absolute Basophils 02/23/2025 0.0  0.0 - 0.2 10e3/uL Final    Absolute Immature Granulocytes 02/23/2025 0.3  <=0.4 10e3/uL Final    Absolute NRBCs 02/23/2025 0.1  10e3/uL Final    GLUCOSE BY METER POCT 02/23/2025 107 (H)  70 - 99 mg/dL Final   Prenatal Office Visit on 02/14/2025   Component Date Value Ref Range Status    Group B Strep PCR 02/14/2025 Negative  Negative Final    Presumed negative for Streptococcus agalactiae (Group B Streptococcus) or the number of organisms may be below the limit of detection of the assay.    Hemoglobin 02/14/2025 8.9 (L)  11.7 - 15.7 g/dL Final    Hold Specimen 02/14/2025 [facility]   Final   Lab on 01/25/2025   Component Date Value Ref Range Status    Gestational GTT Fasting 01/25/2025 86  60 - 94 mg/dL Final    Gestational GTT 1 Hr Post Dose 01/25/2025 189 (H)  60 - 179 mg/dL Final    Gestational GTT 2 Hr Post Dose 01/25/2025 174 (H)  60 - 154 mg/dL Final    Gestational GTT 3 Hr Post Dose 01/25/2025 82  60 - 139 mg/dL Final   Prenatal Office Visit on 01/15/2025    Component Date Value Ref Range Status    Ferritin 01/15/2025 8  6 - 175 ng/mL Final    Iron 01/15/2025 26 (L)  37 - 145 ug/dL Final    Iron Binding Capacity 01/15/2025 562 (H)  240 - 430 ug/dL Final    Iron Sat Index 01/15/2025 5 (L)  15 - 46 % Final    WBC Count 01/15/2025 11.6 (H)  4.0 - 11.0 10e3/uL Final    RBC Count 01/15/2025 3.33 (L)  3.80 - 5.20 10e6/uL Final    Hemoglobin 01/15/2025 9.2 (L)  11.7 - 15.7 g/dL Final    Hematocrit 01/15/2025 29.1 (L)  35.0 - 47.0 % Final    MCV 01/15/2025 87  78 - 100 fL Final    MCH 01/15/2025 27.6  26.5 - 33.0 pg Final    MCHC 01/15/2025 31.6  31.5 - 36.5 g/dL Final    RDW 01/15/2025 14.4  10.0 - 15.0 % Final    Platelet Count 01/15/2025 243  150 - 450 10e3/uL Final    % Neutrophils 01/15/2025 81  % Final    % Lymphocytes 01/15/2025 10  % Final    % Monocytes 01/15/2025 7  % Final    % Eosinophils 01/15/2025 0  % Final    % Basophils 01/15/2025 0  % Final    % Immature Granulocytes 01/15/2025 1  % Final    Absolute Neutrophils 01/15/2025 9.4 (H)  1.6 - 8.3 10e3/uL Final    Absolute Lymphocytes 01/15/2025 1.1  0.8 - 5.3 10e3/uL Final    Absolute Monocytes 01/15/2025 0.9  0.0 - 1.3 10e3/uL Final    Absolute Eosinophils 01/15/2025 0.0  0.0 - 0.7 10e3/uL Final    Absolute Basophils 01/15/2025 0.0  0.0 - 0.2 10e3/uL Final    Absolute Immature Granulocytes 01/15/2025 0.2  <=0.4 10e3/uL Final   Lab on 01/15/2025   Component Date Value Ref Range Status    Treponema Antibody Total 01/15/2025 Nonreactive  Nonreactive Final    Hemoglobin 01/15/2025 9.2 (L)  11.7 - 15.7 g/dL Final    Glu Gest Screen 1hr 50g 01/15/2025 135 (H)  70 - 129 mg/dL Final   Prenatal Office Visit on 09/30/2024   Component Date Value Ref Range Status    Alpha Feto Protein 09/30/2024 61  ng/mL Final    MoM for AFP 09/30/2024 1.60   Final    AFP Interpretation 09/30/2024 Screen Neg   Final    INTERPRETATION: SCREEN NEGATIVE for open spina bifida                                Neural Tube Defects (NTD)   Negative                                                                  Pre-Test     Post-Test      Cutoff                                       Neural Tube Defects Risks   1:1030       1:2190       1:250                                          Comments:                                                   The risk of an open neural tube defect is less than the  screening cut-off.    This test was developed and its performance characteristics   determined by [facility]. It has not been cleared or   approved by the US Food and Drug Administration. This test   was performed in a CLIA certified laboratory and is   intended for clinical purposes.    Maternal Age at Delivery 09/30/2024 29[age]9  yr Final    Patient Weight 09/30/2024 140.0 lbs.   Final    Estimated Due Date 09/30/2024 03-14-25   Final    Gestational Age Calculated 09/30/2024 16 wks, 3 days   Final    Dating 09/30/2024 Ultrasound   Final    Num of Fetuses 09/30/2024 [place]   Final    Maternal Race 09/30/2024 Nonblack   Final    Insulin Diabetic 09/30/2024 No   Final    Smoking Status 09/30/2024 No   Final    Fam History of NTD 09/30/2024 No   Final    Specimen Iteration 09/30/2024 See Note   Final    Initial sample  Performed By: [facility]  [address] [place] [id number]  : Dm Zimmerman MD, PhD  [facility] Number: [id number]   Prenatal Office Visit on 08/26/2024   Component Date Value Ref Range Status    Interpretation 08/26/2024 Negative for Intraepithelial Lesion or Malignancy (NILM)    Final    Comment 08/26/2024    Final                    Value:                          Papanicolaou Test Limitations:  Cervical cytology is a screening test with                           limited sensitivity, and regular screening is critical for cancer                           prevention.  Pap tests are primarily effective for the                           diagnosis/prevention of squamous cell carcinoma, not adenocarcinoma or                            other cancers.                              Specimen Adequacy 08/26/2024 Satisfactory for evaluation, endocervical/transformation zone component present   Final    Clinical Information 08/26/2024    Final                    Value:pregnancy    Reflex Testing 08/26/2024 Yes if ASCUS   Final    Previous Abnormal? 08/26/2024    Final                    Value:No    Performing Labs 08/26/2024    Final                    Value:The technical component of this testing was completed at [facility].                                                    Stain controls for all stains resulted within this report have been                           reviewed and show appropriate reactivity.    ABO/RH(D) 08/26/2024 O POS   Final    SPECIMEN EXPIRATION DATE 08/26/2024 [id number]   Final    Antibody Screen 08/26/2024 Negative  Negative Final    SPECIMEN EXPIRATION DATE 08/26/2024 [id number]   Final    WBC Count 08/26/2024 7.5  4.0 - 11.0 10e3/uL Final    RBC Count 08/26/2024 3.83  3.80 - 5.20 10e6/uL Final    Hemoglobin 08/26/2024 12.1  11.7 - 15.7 g/dL Final    Hematocrit 08/26/2024 34.4 (L)  35.0 - 47.0 % Final    MCV 08/26/2024 90  78 - 100 fL Final    MCH 08/26/2024 31.6  26.5 - 33.0 pg Final    MCHC 08/26/2024 35.2  31.5 - 36.5 g/dL Final    RDW 08/26/2024 12.4  10.0 - 15.0 % Final    Platelet Count 08/26/2024 183  150 - 450 10e3/uL Final    Rubella Dotty IgG Instrument Value 08/26/2024 2.43  <0.90 Index Final    Rubella Antibody IgG 08/26/2024 Positive   Final    Suggests previous exposure or immunization and probable immunity.    Treponema Antibody Total 08/26/2024 Nonreactive  Nonreactive Final    Hepatitis B Surface Antigen 08/26/2024 Nonreactive  Nonreactive Final    Hepatitis C Antibody 08/26/2024 Nonreactive  Nonreactive Final    A nonreactive screening test result does not exclude the possibility of exposure to or  infection with HCV. Nonreactive screening test results in individuals with prior exposure to HCV may be due to antibody levels below the limit of detection of this assay or lack of reactivity to the HCV antigens used in this assay. Patients with recent HCV infections (<3 months from time of exposure) may have false-negative HCV antibody results due to the time needed for seroconversion (average of 8 to 9 weeks).    HIV Antigen Antibody Combo 2024 Nonreactive  Nonreactive Final    Negative HIV-1 p24 antigen and HIV-1/2 antibody screening test results usually indicate the absence of HIV-1 and HIV-2 infection. However, such negative results do not rule-out acute HIV infection.  If acute HIV-1 or HIV-2 infection is suspected, detection of HIV-1 or HIV-2 RNA  is recommended.     Culture 2024 <10,000 CFU/mL Mixture of Urogenital Carmen   Final     GBS Neg    Pertinent Radiology:   EFW 6 lbs 12 oz on 2025      Impression/Plan:  1. IUP at 37w2d weeks, IOL for mild polyhydramnios and umbilical cord varix  -Reviewed progress thus far, last SVE was 5 cm.  Discussed 6 cm dilation indicates active labor, since <6 cm still in latent phase of labor  -Discussed currently maternal and fetal status reassuring  -Discussed indications for  for failed induction, discussed ROM and on pitocin for 24 hours is an indication for  even in latent labor.  Discussed risks of prolonged labor including infection and risk of hemorrhage  -Reviewed risks of  including but not limited to pain, bleeding, infection, damage to nearby organs including but not limited to bowel, bladder, ureters, possibly requiring additional surgery for repair.  Discussed risks in future pregnancies  -Reviewed risks of blood transfusion, discussed higher risk for transfusion with lower starting hemoglobin  -BP normotensive, no history of asthma  -Initially after discussion patient reported that she wanted to take a nap for a couple  hours and then re-evaluate.  She called out shortly thereafter and requested to proceed with   -Patient declined SVE prior to proceeding to OR  -Reviewed expected post-op recovery, discussed hospitalization for 2-3 nights after , no lifting more than baby and carseat for 6 weeks and no driving while on narcotics  -All questions answered  -Discussed with charge RN and MDA, currently reassuring fetal and maternal status, will plan to proceed to OR at approximately 2230    Provider: Suyapa Goldman MD    Date: 2025  Time: 9:42 PM    XIOMARA العلي 1995, MRN [id number]

## 2025-02-24 NOTE — PROGRESS NOTES
Data: Manuela Gruber transferred to [facility]25/SDRD60-3 via wheelchair. Patient transferred to Postpartum with .    Action: Receiving unit notified of transfer. Patient and support person notified of room change. Patient and belongings accompanied by Registered Nurse. Bedside report given to Noam CHENG. Fundal check performed with receiving RN. Oriented patient to surroundings. Call light within reach.    Response: Patient tolerated transfer.    Izzy Marmolejo RN  2025 2:49 AM

## 2025-02-24 NOTE — PROVIDER NOTIFICATION
Discussion with Briseida TIWARI at nurses station, discussed Manuela received 2000 mL LR during course of writer shift, urine output from 0210-1331 = 150,  edema noted, from 1430- present urine output is more filemon and better in volume (writer has not emptied irby yet waiting for 4 hours); per Briseida do given another 500 mL fluid flush of LR. Briseida to place order.

## 2025-02-24 NOTE — PROGRESS NOTES
Tete TIWARI bedside for evaluation and discussion of plan with Manuela. Per Tete TIWARI pitocin break to start at this time. Pitocin stopped will resume in 2 hours per Tete TIWARI.    (3) adequate

## 2025-02-24 NOTE — PROGRESS NOTES
D:  Patient admitted to [facility]/GNTE53-8 via wheelchair with  and support person Junito.  A:  Bedside report received from Izzy ALVA Oriented patient and family to surroundings; call light within reach. 4 Part ID bands double checked with reporting RN.  R:  Patient and  tolerated transfer. Care assumed.    Karey Maher RN on 2025 at 2:44 AM

## 2025-02-24 NOTE — PROGRESS NOTES
Reviewed patient's plan for cord blood collection.  Reviewed risk with  that she may lose more blood while awaiting cord blood collection.  Discussed often takes more than a minute to collect cord blood during which time she can continue to lose blood quickly.  Patient adamant that she strongly desires cord blood collection, she reports that she would accept risk of bleeding even if delaying for collection results in need for blood transfusion.      MD Karina

## 2025-02-24 NOTE — PROVIDER NOTIFICATION
Briseida TIWARI called and notified of artifact with FSE, inquired if she would like to perform SVE and replace, Briseida requests writer replace cord first. Will call Briseida back if cord replacement ineffective.

## 2025-02-25 PROCEDURE — 250N000013 HC RX MED GY IP 250 OP 250 PS 637: Performed by: MIDWIFE

## 2025-02-25 PROCEDURE — 120N000001 HC R&B MED SURG/OB

## 2025-02-25 PROCEDURE — 250N000013 HC RX MED GY IP 250 OP 250 PS 637: Performed by: STUDENT IN AN ORGANIZED HEALTH CARE EDUCATION/TRAINING PROGRAM

## 2025-02-25 RX ORDER — OXYCODONE HYDROCHLORIDE 5 MG/1
5 TABLET ORAL EVERY 4 HOURS PRN
Qty: 12 TABLET | Refills: 0 | Status: SHIPPED | OUTPATIENT
Start: 2025-02-25

## 2025-02-25 RX ORDER — IBUPROFEN 800 MG/1
800 TABLET, FILM COATED ORAL EVERY 6 HOURS PRN
Qty: 60 TABLET | Refills: 1 | Status: SHIPPED | OUTPATIENT
Start: 2025-02-25

## 2025-02-25 RX ORDER — AMOXICILLIN 250 MG
1 CAPSULE ORAL DAILY PRN
Qty: 20 TABLET | Refills: 0 | Status: SHIPPED | OUTPATIENT
Start: 2025-02-25

## 2025-02-25 RX ORDER — ACETAMINOPHEN 325 MG/1
975 TABLET ORAL EVERY 6 HOURS
Qty: 60 TABLET | Refills: 1 | Status: SHIPPED | OUTPATIENT
Start: 2025-02-25

## 2025-02-25 RX ADMIN — ACETAMINOPHEN 975 MG: 325 TABLET ORAL at 11:59

## 2025-02-25 RX ADMIN — IBUPROFEN 800 MG: 800 TABLET ORAL at 18:06

## 2025-02-25 RX ADMIN — ARIPIPRAZOLE 5 MG: 5 TABLET ORAL at 10:15

## 2025-02-25 RX ADMIN — ACETAMINOPHEN 975 MG: 325 TABLET ORAL at 06:11

## 2025-02-25 RX ADMIN — IBUPROFEN 800 MG: 800 TABLET ORAL at 06:11

## 2025-02-25 RX ADMIN — ACETAMINOPHEN 975 MG: 325 TABLET ORAL at 18:06

## 2025-02-25 RX ADMIN — IBUPROFEN 800 MG: 800 TABLET ORAL at 00:02

## 2025-02-25 RX ADMIN — IBUPROFEN 800 MG: 800 TABLET ORAL at 12:00

## 2025-02-25 RX ADMIN — ACETAMINOPHEN 975 MG: 325 TABLET ORAL at 00:02

## 2025-02-25 ASSESSMENT — ACTIVITIES OF DAILY LIVING (ADL)
ADLS_ACUITY_SCORE: 30

## 2025-02-25 NOTE — PROGRESS NOTES
"CNM Courtesy Round:    Patient Name:  Manuela Gruber  :      1995  MRN:      [id number]      Assessment:   Day 2 postpartum, s/p  for failed induction.  CNM courtesy round.  Anemia  GDMA1  Bipolar disorder- stable on medication    Plan:    -New mom education and symptom relief measures added to AVS. -Encouraged close follow-up with psychiatry team postpartum including follow-up visit scheduled 25.  Discussed postpartum mood changes and adjustments, postpartum blues vs. postpartum depression, sleep changes and required support.   -Plan for today: encouraged rest, skin-to-skin, breastfeeding on cue, adequate pain control and limiting visitors.   -Discussed that patient is under physician management with CNM support as needed.     Subjective:  Integrating birth experience, \" it was not what we wanted or planned, but everything was explained to us and we made the decisions\". Pain is  well controlled with current medications. Baby is breastfeeding on cue with IBCLC support. Postpartum contraception plans include POPs. Support at home identified as adequate. Patient will have 6 weeks off from work.  Partner flexible and works from home. Her parents and in laws are in town for extra support.     Objective:  /64 (BP Location: Right arm)   Pulse 86   Temp 98  F (36.7  C) (Oral)   Resp 16   Ht 1.499 m (4' 11\")   Wt 81.6 kg (179 lb 12.8 oz)   LMP  (LMP Unknown)   SpO2 98%   Breastfeeding Unknown   BMI 36.32 kg/m      No exam. Courtesy round only.     Provider:  CHACHA Smith, KARIE, IBCLC  [facility]    Date:  2025  Time:  9:07 AM   "

## 2025-02-25 NOTE — PLAN OF CARE
Problem: Adult Inpatient Plan of Care  Goal: Plan of Care Review to Patient and significant other.  Problem: Adult Inpatient Plan of Care  Goal: Optimal Comfort and Wellbeing  Intervention: Monitor Pain and Promote Comfort  Recent Flowsheet Documentation  Taken 2025 0002 by Mojgan Thapa RN  Pain Management Interventions: medication (see MAR)     Problem: Labor  Goal: Acceptable Pain Control  Intervention: Support Labor Pain Coping and Management  Recent Flowsheet Documentation  Taken 2025 0000 by Mojgan Thapa RN  Sensory Stimulation Regulation:   lighting decreased   care clustered   auditory stimulation provided   quiet environment promoted     Problem: Postpartum ( Delivery)  Goal: Effective Urinary Elimination  Intervention: Monitor and Manage Urinary Retention  Recent Flowsheet Documentation  Taken 2025 0000 by Mojgan Thapa RN  Urinary Elimination Promotion: frequent voiding encouraged     Description: The Plan of Care Review/Shift note should be completed every shift.  The Outcome Evaluation is a brief statement about your assessment that the patient is improving, declining, or no change.  This information will be displayed automatically on your shift  note.  Outcome: Progressing  Flowsheets (Taken 2025 0036)     Goal Outcome Evaluation:      Outcome Evaluation: Fernando remain asymptomatic with a  post delivery Hgb of 7.4. She received iron infusion. she is aware to call with any signs of anemia.vss, post c/section assessment are WNL except 2-3 + lower extremities edema bilaterally. she is ambulating to bathroom voiding without difficulty.Lower abdominal incisional pain well managed with Motrin 800 mg and Tylenol 975 mg. Breastfeeding improving. requires assistance / support and encouragement with staff. Parents loving /bonding and appropriate with baby.   Mojgan Thapa RN

## 2025-02-25 NOTE — PLAN OF CARE
Goal Outcome Evaluation: Progressing       Plan of Care Reviewed With: patient    Overall Patient Progress: improvingOverall Patient Progress: improving    Outcome Evaluation: Patient's VS and PP assessments WNL.  She's ambulating and voiding without difficulty.  Manuela usually says she has no pain; rated at 1/10 before scheduled Tylenol and Ibuprofen.  FFU/U, scant rubra lochia. No drainage on mepilex dressing. 3-4 edema on legs and feet; she reports that this is about the same as during her pregnancy.   Breastfeeding is going well; met with lactation earlier today.  She's using football hold and baby latches well, many swallows heard. Manuela's nipples are intact and comfortable.    Problem: Postpartum ( Delivery)  Goal: Successful Parent Role Transition  Intervention: Support Parent Role Transition  Recent Flowsheet Documentation  Taken 2025 1620 by Faith Horowitz RN  Supportive Measures:   active listening utilized   counseling provided   positive reinforcement provided   self-care encouraged  Parent-Child Attachment Promotion:   caring behavior modeled   cue recognition promoted   face-to-face positioning promoted   interaction encouraged   participation in care promoted   positive reinforcement provided   rooming-in promoted   skin-to-skin contact encouraged     Problem: Postpartum ( Delivery)  Goal: Anesthesia/Sedation Recovery  Outcome: Progressing     Problem: Postpartum ( Delivery)  Goal: Nausea and Vomiting Relief  Outcome: Met     Problem: Postpartum ( Delivery)  Goal: Effective Urinary Elimination  Outcome: Met  Intervention: Monitor and Manage Urinary Retention  Recent Flowsheet Documentation  Taken 2025 1620 by Faith Horowitz RN  Urinary Elimination Promotion: frequent voiding encouraged

## 2025-02-25 NOTE — PLAN OF CARE
Problem: Adult Inpatient Plan of Care  Goal: Absence of Hospital-Acquired Illness or Injury  Outcome: Progressing  Intervention: Prevent Skin Injury  Recent Flowsheet Documentation  Taken 2025 by Dipti Mathews RN  Body Position: position changed independently  Intervention: Prevent and Manage VTE (Venous Thromboembolism) Risk  Recent Flowsheet Documentation  Taken 2025 by Dipti Mathews RN  VTE Prevention/Management:   SCDs on (sequential compression devices)   SCDs off (sequential compression devices)  Intervention: Prevent Infection  Recent Flowsheet Documentation  Taken 2025 by Dipti Mathews RN  Infection Prevention:   cohorting utilized   equipment surfaces disinfected   hand hygiene promoted   rest/sleep promoted     Problem: Adult Inpatient Plan of Care  Goal: Optimal Comfort and Wellbeing  Outcome: Progressing  Intervention: Provide Person-Centered Care  Recent Flowsheet Documentation  Taken 2025 by Dipti Mathews RN  Trust Relationship/Rapport:   care explained   choices provided   emotional support provided   questions answered   questions encouraged   reassurance provided   thoughts/feelings acknowledged     Problem: Postpartum ( Delivery)  Goal: Successful Parent Role Transition  Outcome: Progressing  Intervention: Support Parent Role Transition  Recent Flowsheet Documentation  Taken 2025 by Dipti Mathews RN  Supportive Measures:   active listening utilized   positive reinforcement provided   self-care encouraged  Parent-Child Attachment Promotion:   interaction encouraged   rooming-in promoted   skin-to-skin contact encouraged   Goal Outcome Evaluation:      Plan of Care Reviewed With: patient    Overall Patient Progress: improvingOverall Patient Progress: improving    Outcome Evaluation: VSS, pain controlled using tylenol and ibuprofen. Up ad yohannes caring for self and baby. One IV remains in place as pt is a difficult start.  Tolerating general diet. Lungs clear, fundus firm down one, bleeding scant. working on latch and breastfeeding with some soreness/redness of nipple. pumping after feedings to stimulate milk. LC occurred today.

## 2025-02-25 NOTE — PROGRESS NOTES
Obstetrics Post-Op Progress Note         Assessment and Plan:    Assessment: Manuela Gruber is a 29 year old  Post-operative day #2 s/p Low transverse primary  section doing well.     L&D complications: Acute on chronic anemia, history of GDM A1          Plan:   S/p IV iron  Asymptomatic, discussed iron rich foods  F/up at 6 weeks pp for GTT  Possible discharge later today, watching infant for blood sugars and repeat hearing test           Interval History:   Patient feels well, ambulating and voiding without dizziness, feels ready for discharge if able             Physical Exam:   Temp: 98  F (36.7  C) Temp src: Oral BP: 121/64 Pulse: 86   Resp: 16 SpO2: 98 % O2 Device: None (Room air)    Vitals:    25 0052   Weight: 83.2 kg (183 lb 6.4 oz) 81.6 kg (179 lb 12.8 oz)     Vital Signs with Ranges  Temp:  [98  F (36.7  C)-98.4  F (36.9  C)] 98  F (36.7  C)  Pulse:  [78-90] 86  Resp:  [16-18] 16  BP: ()/(56-78) 121/64  SpO2:  [97 %-100 %] 98 %  I/O last 3 completed shifts:  In: -   Out: 1225 [Urine:1225]    Uterine fundus is firm, non-tender and at the level of the umbilicus, incision covered and c/d/I  Ext 2+ pedal edema no calf tenderness          Data:   No results found for this or any previous visit (from the past 24 hours).  Recent Labs   Lab Test 25  2030   AS Negative     Recent Labs   Lab Test 25  0610 25  1650   HGB 7.4* 8.4*     Recent Labs   Lab Test 24  1233   RUQIGG Positive       Jenifer Kennedy MD

## 2025-02-25 NOTE — DISCHARGE INSTRUCTIONS
"New Mother Care    Thank you for sharing your birth experience with the [facility].  Congratulations on the birth of your baby!    \"We hope you had a positive experience and that you can definitely recommend [facility] to your family and friends. You ll be receiving a survey soon, and we look forward to hearing your feedback\".    POSTPARTUM INFORMATION AND RESOURCES:     Congratulations on the birth of your baby. We have gathered together some information on staying healthy in the postpartum time including information on exercise, nutrition and mental health. We have also listed some local and national resources for lactation support and mental health support.     If you have questions or concerns and need to speak with a midwife immediately, please call the on-call midwife at [phone].     If you have a non-emergent question or would like to schedule a follow up appointment, please call the clinic midwife at [phone].    Postpartum Appointment:  You should have your postpartum appointment at six weeks after delivery.  If you had a  birth, you should have an appointment at two weeks with the physician who performed your surgery, and six weeks with the midwives. Call [phone] to schedule this appointment.     Lactation Appointment with CNM:   If you would like to make a clinic appointment for breastfeeding support with one of the Certified Nurse-Midwives who is also an International Board Certified Lactation Consultant, please call [phone].     Postpartum Changes:  You have experienced many physical and emotional changes during your pregnancy.  After delivery, you will notice other changes.  Here are some tips for common experiences after your baby is born.      Sign/Symptom Cause Suggestions   Mood Swings Hormonal changes, stress, fatigue Rest.  Ask for help.  Contact your health care provider if sadness continues more than two " "weeks, or caring for baby becomes overwhelming.   Engorged Breasts Swelling with more fluid and breast milk production two to five days after delivery.  May occur whether or not you are breastfeeding. Heat or ice for comfort.  If breastfeeding, nurse frequently.  Use supportive bra without underwire.  Should improve in one to two days.   After pains/ Cramping Cramping of uterus, often with nursing which stimulates the uterus to tighten.  Stronger with subsequent babies (second or more). Use non-aspirin pain reliever (ibuprofen or acetaminophen), especially before breastfeeding.  Empty your bladder frequently (at least every 2 hours).   Increased vaginal bleeding Too much physical activity; breastfeeding (due to uterine contractions). Rest more.  Avoid use of tampons.  Redness and amount of vaginal discharge (bleeding) decreases by three to four weeks after delivery.  Call clinic if you fill more than one pad within two hours.   Perineal discomfort and hemorrhoids Swelling from delivery; episiotomy or laceration (tearing); stitches. Ice, non-asprin pain reliever, witch hazel pads, warm tub soaks, stool softener, high fiber diet, kegel exercises. Stitches are absorbed.  Healing in two to three weeks. Do NOT use \"doughnut\" pillow for sitting.   Increased sweating and urinating Body losing extra fluid from pregnancy; hormonal shifts. Empy bladder more often, especially before breastfeeding; increase water intake; improves within three to four days.   Constipation Change in abdominal pressure and swelling after delivery; hormonal changes. Increase fluids and fiber in diet; Metamucil or stool softner as needed.   Skin coloring  Hair Thickness  Swelling Skin darkening and pregnancy rash due to hormonal changes; extra hair growth during pregnancy; increased fluids from pregnancy and birth causes swelling. Darker skin coloring and stretch marks with fade after delivery (no treatment needed).  Extra hair will be lost in two " to four months.  Pregnancy swelling resolves in one to four weeks. Continue to hydrate.   Sciatica pain Nerve irritation due to extra weight and pressure during pregnancy. May continue after delivery; heat, acetaminophen, ibuprofen, position changes for comfort.     Postpartum Exercises  These exercises may be started soon after delivery.  If you feel tired or uncomfortable, stop and try these exercises after resting.  Check for any separation in your stomach wall before doing exercises that involve twising or stress on your stomach muscles.  Place your fingers in the center of your upper abdomen and lift your head and shoulders up in a partial sit-up.  If you feel a separation in your muscle wall, it is too soon to do sit ups.  Try the following instead:  Tighten and relax your pelvic floor muscles often.  Breathe deeply while laying on your back.  As you breathe out, lift just your head up and pull the sides of your stomach toward the middle with your hands.  Then breathe in as you put your head down.  This will help to close the separation of your stomach.  Tilt your pelvis back and forth.  Alternate this with full body stretches.  Move your feet back and forth, then in circular motions.  While lying on your back, slide your heels toward your hips and bend your knees one at a time, then together.  While lying flat on the floor, bend your knees and raise your legs one at a time.  When the stomach wall separation has closed, you can progress to straight curl-ups, diagonal curl-ups, and side leg lifts.    After a  Birth  In addition to the instruction after a vaginal delivery, follow these guidelines:  Check your incision each day for signs of infection: redness, drainage, warmth or discomfort.  Contact your midwife or OB who performed your surgery if you have any of these signs or heavy vaginal bleeding.  Check with your midwife or surgeon before taking tub baths.  You may start driving a car two weeks  after delivery.  Gradually increase your physical activity and exercise level according to how comfortable or tired you feel.  During the first days after delivery, try deep breathing, bending and stretching your feet in up-and-down circular motions, extending and tightening your legs while crossed at the ankles, and doing isometric exercises while lying down.  Next, try pelvic lifts with bent knees, bending and straightening your knees separately and together.  After checking for the abdominal rectus (stomach wall) separation, advance to back arching, twisting to each side, and reaching for your knees with pillow support.  Straight curl-ups, diagonal curl-ups and side leg lifts can then be added.    Reminders  Healthy nutrition is still important for your recovery and breastfeeding.  Continue your prenatal vitamins if you are breastfeeding.  It is important for your health and your baby's health to stay smoke-free.  Babies exposed to smoke are sick more often.  Family planning is important.  Discuss birth control options with your provider.    Warning Signs  Call the on-call midwife if you have:  Heavy bleeding (filling one pad within one to two hours).  Blood clots larger than the size of a golf ball, especially with heavy bleeding.  Vaginal discharge with foul odor or green color.  Fever (oral temperature over 100.3 F).  Signs of bladder infection (burning, frequent urination)  Signs of vaginal infection (vaginal itching, irritation)  Painful, hard lump in breast and/or red streaks with fever.  Vaginal pain or tissue coming out of vagina.  Abdominal pain or abdomen tender to the touch.  Signs of depression or anxiety, affecting sleep or activities of daily living.    --------------------------------------------------------------------------------------------------------  EXERCISE & NUTRITION:      Getting and Staying Active: A Healthy You!   -Why should I exercise? Exercise, being physically active, is very  important for all women. Being active can help you:   Lose or maintain weight   Have more energy   Sleep better   Enjoy sex more   Relieve stress and think better   Lower the chance you will have heart disease, high blood pressure, and diabetes   Strengthen your bones and muscles   Have fewer hot flashes if you are older   -How active do I have to be to get the bene?ts of exercise?  Studies show that as little as 15 minutes of moderate exercise--like fast walking or dancing--3 times a week can improve the health of your heart. Ifyou want to get the best benefits from exercise, try to increase your physical activity to at least 30 minutes, 5 times a week. If you have a serious health problem,be sure to talk with your health care provider before starting an exercise program.   Https://Activation Life/  Http://www.Gigwell/     @[email or web address]  @MyPelvicFloorMuscles  @The.[email or web address].isper     Taking Care of your Health: Health Care Maintenance Screening recommendations   In all the things women do, taking care of everything and everybody else, they sometimes forget to take care of themselves. This handout reviews the health screenings and vaccines that are recommended for women of all ages. Talk with yourhealth care provider about which tests and vaccines you need. The chart below lists the screening tests and vaccinations recommended for healthy women who do not have a high risk for most diseases.   The recommendations in thischart are guidelines only. For some tests and vaccines, the chart says you should talk to your health care provider.This is because the best recommendations for you depend on your personal health history. Your health care provider may suggest more frequent testing or additional tests ifyou havea higher chance of getting some diseases      Planning Your Family: Developing a Reproductive Life Plan   Planning ahead can help you avoid getting pregnant when you don t want to be pregnant and also  "be in good health if and when you do decide to become pregnant. Many women have at least one pregnancy in their lives, even if it was not planned. In fact, about half of all pregnancies are not planned. Getting pregnant when you did not plan it can be a problem, or it can turn into a happy event. Planning pregnancy leads to healthier pregnancies, healthier mothers, and healthier families.   Although many women want to have a family, not everyone wants to have children. More and more women are childless by choice (also known as childfree). Whether to have children is a personal choice that only you can make. It s okay not to want children! If you never want to get pregnant, it is important to make sure you always use very effective birth control, such as an intrauterine device, the birth control implant, female sterilization (having your tubes tied), or your partner having a vasectomy.      Reliable resources:   ?   American College of Nurse-Midwives (ACNM) http://www.midwife.org/; look at the informational handouts at http://www.midwife.org/Share-With-Women   ??   Women's Health.gov:   http://www.womenshealth.gov/a-z-topics/index.html   FDA - Nutrition ?www.mypyramid.gov? Under \"For Consumers,\" click on \"pregnant and breastfeeding women.\"   ?   Vaccines : Centers for Disease Control and Prevention (CDC) http://www.cdc.gov/vaccines/   ?   [facility] www.ClevelandPublishThis.com   ?   When researching information on the web, question the validity of websites.? The domains .gov, .edu and.org tend to be more reliable information.? If there are a lot of advertisements, be cautious of the information provided. Stay away from blogs and chat rooms please!   ?   ?   Nutrition and supplements:   Daily multivitamin vitamin (with 400 - 1000 mcg folic acid).? Take with food as needed.   ?   4-5 servings of dairy or other calcium rich foods (fish, leafy greens, soy)?per day - if not, take 500-1000 mg additional calcium (Tums, pills, " chews). Take with dairy.   ?   Vitamin D3 4000 IU geltab daily. Vitamin D rich foods: Cod Liver Oil (1Tbsp), Crystal, Mackerel, Tuna, fortified milk and yogurt, Beef and liver, sardines, egg, fortified cereals, swiss cheese.? Take supplements with fattiest meal.?   ?   2-3 (4) oz servings of fish, seafood, nuts (walnuts & almonds), oils, avocado per week - if not, take Omega 3 Fatty acids: DHA & ELIUD 5180-1327 mg per day. ?Other names: cod liver oil, fish oil. Take with fattiest meal.   ?   ?---------------------------------------------------------------------------------------------------------  POSTPARTUM & LACTATION RESOURCES :         Early Childhood Family Education [place] [address] provides a free, drop-in class/breastfeeding support group, facilitated by a lactation consultant and .  During the group you can connect with other parents, weigh your baby, ask questions about feeding and baby development, and more.  You do not need to register.   Fall in-person meetings will begin on , are for parents of babies from birth to 9 months, and will meet on Monday evenings from 6 - 7:15 pm in [facility], which is at [address] [place] [address].  [place] [address] also offers virtual group meetings with a lactation consultant/.  These take place on , from 11:30 am - 12:30 pm for parents of newborns to 3-month-olds, and from 4:15 - 5:15 for parents of 3 - 9 - month olds.  To get the meeting link contact Flori Vu at [phone].    [facility] offers a free, drop-in breastfeeding support group facilitated by HILL Rowe.   at [facility] [address], [place].  A scale is available to check baby weights, if desired.  [facility] also has a variety of new parent classes:  (cost for registration)  https://Kairos AR/classes/    [facility] facilitated by Mariza White, IBCLC:  Free, drop-in  "support group for breastfeeding, with baby scale available if needed.  Meets at [facility], second Tuesday of each month, 10 am - 12 pm.  Text [phone] for info.    [facility] Support Group, Tuesdays at 10:30 am   Run by Nkechi Miranda, HILL of [facility]act[facility]   Go to [facility] Facebook page, click on \"events\" for link:   Https://www.GKN - GloboKasNet.com/events/[email or web address]/    [facility] breastfeeding support community:  free, drop-in breastfeeding support facilitated by Certified Lactation Counselors, open Mondays and Wednesdays from 1 pm - 5 pm.  In [place]:  [facility], [address] [facility]:   guidingvernellMobileCauseta.org    [facility] Milk Hour, Thursdays at 2:30 pm    Run by Lorenzo Hall, PANCHOCLC  Go to [facility] FB page and send message to get link   Https://www.GKN - GloboKasNet.Artwardly/The Pratley Companyfoundations/    Adilson Blake:  http://www.Boom Financialndas.org/    [facility] offers a Lactation Lounge every Friday 12pm - 1pm, run by Adilson Meng Leader.  Sign up via link at [employer]/cbe-lactation   https://www.[employer]/cbe-lactation    [facility] is offering virtual support groups every Monday, 10:30 am - 12 pm, run by RN IBCLC: Https://www.GKN - GloboKasNet.com/events/[email or web address]/    Culturally-Specific Breastfeeding Support:     For Hmong Families:   The Hmong Breastfeeding Coalition is a wonderful support for [facility] women who are breastfeeding.  They are best found on Facebook.    for Black families:    Chocolate Milk Club:  http://www.Capitol Bells.Artwardly/chocolate-milk-club/  Email: [email or web address]    R.O.S.E. = Reaching our Sisters Everywhere  Http://www.breastfeedingrose.org/    Club Mom breastfeeding support for Black mothers:  Contact Volodymyr Kelly  Phone: [phone]   Email:  [email or web address].     Marce" "Naima  Phone: [phone]   Email:  [email or web address].    Club Dad parent support for Black fathers:   Contact Nathan Chantal   Phone: [phone]   Email:  [email or web address].    For Native/Indigenous Families:    https://www.OpenRoad Integrated Media.com/groups/nitamising.gimashkikinaan        OUTPATIENT LACTATION RESOURCES   -Schedule an appointment with a [facility] [place] [facility] who is also a Lactation Consultant by calling [phone].  Visits on Tuesdays, Wednesdays and Thursdays at multiple locations.  Call: [phone].       - Information on medication use while breastfeeding: http://toxnet.nlm.nih.gov/newtoxnet/lactmed.htm      Other Online Breastfeeding Resources:   Breastfeedingmadesimple.Tensilica   Llli.org (La Leche League)   Normalfed.com   Womenshealth.gov/breastfeeding   Workandpump.com   [employer]  https://BlueVine/abcs/   Click on \"Learn More About Attachment\"     -New Parent Connection Drop-In:  In collaboration with [facility], Early Childhood Family Education (FE), a program of [address], offers ongoing classes for new parents in their infants.  The connection classes offer support and resources to parents during the exciting and challenging period of transition following the birth of her baby.  Parents and babies (birth to 6 months of age) may join the group at any time.  Baby's birth to 3 months meet Tuesdays, from 1230 to 1:30 PM  Babies 3-6 months meet Tuesdays, from 4 to 5 PM     -[facility] New Mama Group: www.Cell Therapy.Tensilica/free-new-mama-group  -Attend [facility]'s Free New Mama. Groups located at three locations:  [facility] and [place]. Sign up online.         Additional Resources:?   -American College of Nurse-Midwives (ACNM) http://www.midwife.org/; look at the informational handouts at http://www.midwife.org/Share-With-Women  www.mymidwife.org   ?   -Women's Health.gov:   http://www.womenshBannerman Resourcesth.gov/a-z-topics/index.html   ? "   -Early Childhood and Family Education (ECFE):   ECFE offers parents hands-on learning experiences that will nourish a lifetime of teachable moments.   http://ecfe.info/ecfe-home/         -----------------------------------------------------------------------------------------------------------   (Before, during and after pregnancy)   MOOD DISORDER RESOURCES :  Are you feeling sad or depressed?   Do you feel more irritable or angry with those around you?   Are you having difficulty bonding with your baby?   Do you feel anxious or panicky?   Are you having problems with eating or sleeping?   Are you having upsetting thoughts that you can t get out of your mind?   Do you feel as if you are  out of control  or  going crazy ?   Do you feel like you never should have become a mother?   Are you worried that you might hurt your baby or yourself?     Any of these symptoms, and many more, could indicate that you have a form of  mood or anxiety disorder, such as postpartum depression. While many women experience some mild mood changes during or after the birth of a child, 15 to 20% of women experience more significant symptoms of depression or anxiety. Please know that with informed care you can prevent a worsening of these symptoms and can fully recover. There is no reason to continue to suffer.  Women of every culture, age, income level and race can develop  mood and anxiety disorders. Symptoms can appear any time during pregnancy and the first 12 months after childbirth. There are effective and well-researched treatment options to help you recover. Although the term  postpartum depression  is most often used, there are actually several forms of illness that women may experience.     Resources:     -Pregnancy and Postpartum Support [place]: www.ppsupportmn.org  Who We Are: We are a group of mental health &  practitioners, service organizations, and mother volunteers who provides  "services to those struggling with a pregnancy, loss, or postpartum mood disorder through the Helpline, professional training, our resource list and website.  What We Do: We provide support, advocacy, awareness, and training about  mental health in [place].      Community Resource List:   This is a list of  resources within our community.   http://Action Engineupportmn.org/communityresourcelist    PSYCHOTHERAPISTS/CONSULTANTS   This is a list of licensed mental health professionals who have advanced clinical skills in the treatment of postpartum mood and anxiety disorders (PMADs).   Http://Action Engineupportmn.org/mentalhealthproviderresourcelist   INTEGRATIVE MEDICINE PRACTITIONERS:   This is a list of licensed providers who practice Integrative Medicine: a form of treatment that combines alternative medicine with evidence based medicine in an effort to treat the  whole person  (the person, not just the disease).   http://Action Engineupportmn.org/integrativemedicineproviders    PSYCHIATRIC CARE   This is a list of licensed Psychiatrists who have advanced clinical skills in the treatment and medication management for PMADs and lactating mothers.   Http://Action Engineupportmn.org/psychiatryproviderresroucelist   Click on the links below to find detailed profiles and contact information of providers who have been screened and approved as having advanced training in the area of PMADs. Please note: [facility] does not endorse a specific provider.   The list is in alphabetical order by city. You can also search for providers by city or zip code using the search box. Please click on the \"Show\" box to the upper right to advance to the next page. You may also call our Helpline at [phone] if you would like for our Helpline volunteer to find a provider for you.     -Postpartum Depression Support Group:   Weekly groups at no cost through [facility], , 1:30-3:00 p.m., at [facility] " Outpatient Clinic, [address], [address]. [place], , 1:30-3:00 p.m., at [facility], [address]. To register for the group or get more information, call [phone].   -Postpartum Depression Support Group:   Outpatient Intensive Treatment program for women with  mood disorders [facility]     -[facility]  Resource Guide      Women s Mental Health at [facility]  This website provides a range of current information including discussion of new research findings in women s mental health and how such investigations inform day-to-day clinical practice. Despite the growing number of studies being conducted in women s health, the clinical implications of such work are frequently controversial, leaving patients with questions regarding the most appropriate path to follow. Providing these resources to patients and their doctors so that individual clinical decisions can be made in a thoughtful and collaborative fashion dovetails with the mission of our Center.     https://womensmentalhealth.org/        If you re having thoughts of harming yourself or your baby, it is vital to get support immediately. Call 911 or go to the nearest E.R.     TOLL-FREE / NATIONWIDE EMERGENCY ASSISTANCE   Immediate Emergency:  988  National Suicide Prevention Lifeline:   1-902.859.1342   Suicide Prevention Hotline:   9-790-SCBDEUV   National Postpartum Depression Hotline:   -PPD-MOMS   Postpartum Support International (PSI)   PPD Helpline: (not a 24-hour hotline)   1-507.775.1179     If you have a non-emergent question or would like to schedule a follow up appointment, please call the clinic midwife at [phone].    If you have questions or concerns and need to speak with a midwife immediately, please call the on-call midwife at [phone].    Warning Signs after Having a Baby    Keep this paper on your fridge or  somewhere else where you can see it.    Call your provider if you have any of these symptoms up to 12 weeks after having your baby.    Thoughts of hurting yourself or your baby  Pain in your chest or trouble breathing  Severe headache not helped by pain medicine  Eyesight concerns (blurry vision, seeing spots or flashes of light, other changes to eyesight)  Fainting, shaking or other signs of a seizure    Call 9-1-1 if you feel that it is an emergency.     The symptoms below can happen to anyone after giving birth. They can be very serious. Call your provider if you have any of these warning signs.    My provider s phone number: _______________________    Losing too much blood (hemorrhage)    Call your provider if you soak through a pad in less than an hour or pass blood clots bigger than a golf ball. These may be signs that you are bleeding too much.    Blood clots in the legs or lungs    After you give birth, your body naturally clots its blood to help prevent blood loss. Sometimes this increased clotting can happen in other areas of the body, like the legs or lungs. This can block your blood flow and be very dangerous.     Call your provider if you:  Have a red, swollen spot on the back of your leg that is warm or painful when you touch it.   Are coughing up blood.     Infection    Call your provider if you have any of these symptoms:  Fever of 100.4 F (38 C) or higher.  Pain or redness around your stitches if you had an incision.   Any yellow, white, or green fluid coming from places where you had stitches or surgery.    Mood Problems (postpartum depression)    Many people feel sad or have mood changes after having a baby. But for some people, these mood swings are worse.     Call your provider right away if you feel so anxious or nervous that you can't care for yourself or your baby.    Preeclampsia (high blood pressure)    Even if you didn't have high blood pressure when you were pregnant, you are at risk for  the high blood pressure disease called preeclampsia. This risk can last up to 12 weeks after giving birth.     Call your provider if you have:   Pain on your right side under your rib cage  Sudden swelling in the hands and face    Remember: You know your body. If something doesn't feel right, get medical help.     For informational purposes only. Not to replace the advice of your health care provider. Copyright 2020 [employer] [facility]. All rights reserved. Clinically reviewed by Petra Valdez RNC-OB, MSN. [employer] [id number] - Rev 02/23.

## 2025-02-25 NOTE — LACTATION NOTE
This note was copied from a baby's chart.  Follow up Lactation Visit    Current age : 39 hours old    Gestational age at delivery: 37w2d     Diaper count (last 24 hours) : 6 wet 5 soiled Meconium     Feedings (last 24 hours): 9 breast 2 Human donor breastmilk and Formula 24k/zelalem 20mL    Weight Loss: 4% at 24 hours    Breastfeeding goals: 6-12 months    Past breastfeeding experience:first child    Maternal health risk that may affect breastfeeding: GDM, Anemia    Home Pump: Spectra     Breast assessment: Breast: Round and Symmetrical, Nipple:Everted and Intact, left top of nipple abraded.     Feeding assessment: Mother independent with positioning and latching, but allowing infant to only latch onto her nipple. Mother stated that latch was comfortable, but encouraged a deeper latch to prevent nipple soreness. Swallows observed 5-8:1 ratio. After 10 minutes moved infant to right side and infant much sleepier on this side, minimal swallows observed. Encouraged breast compression, but infant remained sleepy.   LC bottled infant in side lying 24k/zelalem formula for 36ml while Mother pumped. She collected 0.6mL.     Visit Summary: Encouraged with continued low blood sugars and infant gestational age 37:2weeks, to limit time at the breast and to keep infant more engaged with breast compressions. Mother is using a 21mm flange with pumping, but encouraged her to use the 24mm.      Feeding plan: Continue to feed every 2-3 hours and infant cues. Limit time at breast to 10-15min, than move to bottle goal of 30ml and pumping for 15 minutes. May do speed round of focusing on just pumping and bottling if needed rest.     Education:   [x] Prentiss Feeding Patterns in the First Few Days/second Night     [] Maternal Risk Factors that Could Affect Milk Supply      [] Hand Expression   [] Stages of Milk Production           [x] Feeding Cues           [x] LPI Feeding Behaviors  [] LBW Feeding Behaviors    [x] Rousing Techniques          []  Benefits of Skin to Skin           [x] Breastfeeding Positions          [x] Tips to Maintain a Deep Latch          [x] Nutritive vs Non-Nutritive Sucking           [x] Gentle Breast Compressions  []  Weight Loss   [] How to Know When Baby is Getting Enough to Eat  [x] Supplementation Methods        [x] Type of Supplement   [] Nipple Shield  [] Sore Nipples        [] Pacifier Use  [] Tight Frenulum           [] Breastfeeding Twins  [x] Pumping Plan  [x] Breastpump Education   [] Engorgement/Reverse Pressure Softening       [x] Inpatient Lactation Support      [] Outpatient Lactation Resources/Follow up    Follow up: LC will continue to follow up during hospital stay.

## 2025-02-26 VITALS
HEIGHT: 59 IN | WEIGHT: 174.06 LBS | OXYGEN SATURATION: 98 % | RESPIRATION RATE: 16 BRPM | BODY MASS INDEX: 35.09 KG/M2 | TEMPERATURE: 98.1 F | SYSTOLIC BLOOD PRESSURE: 118 MMHG | HEART RATE: 93 BPM | DIASTOLIC BLOOD PRESSURE: 86 MMHG

## 2025-02-26 PROCEDURE — 250N000013 HC RX MED GY IP 250 OP 250 PS 637: Performed by: STUDENT IN AN ORGANIZED HEALTH CARE EDUCATION/TRAINING PROGRAM

## 2025-02-26 PROCEDURE — 250N000013 HC RX MED GY IP 250 OP 250 PS 637: Performed by: MIDWIFE

## 2025-02-26 RX ADMIN — IBUPROFEN 800 MG: 800 TABLET ORAL at 06:26

## 2025-02-26 RX ADMIN — IBUPROFEN 800 MG: 800 TABLET ORAL at 12:37

## 2025-02-26 RX ADMIN — ACETAMINOPHEN 975 MG: 325 TABLET ORAL at 12:36

## 2025-02-26 RX ADMIN — ACETAMINOPHEN 975 MG: 325 TABLET ORAL at 06:26

## 2025-02-26 RX ADMIN — IBUPROFEN 800 MG: 800 TABLET ORAL at 00:12

## 2025-02-26 RX ADMIN — ACETAMINOPHEN 975 MG: 325 TABLET ORAL at 00:12

## 2025-02-26 RX ADMIN — ARIPIPRAZOLE 5 MG: 5 TABLET ORAL at 11:33

## 2025-02-26 ASSESSMENT — ACTIVITIES OF DAILY LIVING (ADL)
ADLS_ACUITY_SCORE: 30

## 2025-02-26 NOTE — PLAN OF CARE
Goal Outcome Evaluation: Progressing       Plan of Care Reviewed With: patient    Overall Patient Progress: improvingOverall Patient Progress: improving    Outcome Evaluation: Manuela's VSS; PP assessments WNL.  Independent with self and baby cares.  Scheduled Tylenol and Ibuprofen for abdominal/incisional pain she rates 0-1.  Breastfeeding going well; nipples intact and comfortable.  She's also pumping, getting small amounts of colostrum.  She is planning on discharge tomorrow.    Problem: Postpartum ( Delivery)  Goal: Successful Parent Role Transition  Outcome: Progressing  Intervention: Support Parent Role Transition  Recent Flowsheet Documentation  Taken 2025 1540 by Faith Horowitz RN  Supportive Measures:   active listening utilized   counseling provided   decision-making supported   positive reinforcement provided   self-care encouraged  Parent-Child Attachment Promotion:   caring behavior modeled   cue recognition promoted   face-to-face positioning promoted   participation in care promoted   positive reinforcement provided   skin-to-skin contact encouraged   Problem: Postpartum ( Delivery)  Goal: Effective Bowel Elimination  Outcome: MetI  Problem: Postpartum ( Delivery)  Goal: Anesthesia/Sedation Recovery  Outcome: Met

## 2025-02-26 NOTE — PLAN OF CARE
"Goal Outcome Evaluation: Progressing      Plan of Care Reviewed With: patient    Overall Patient Progress: improving    Patient's VSS. Fundus is firm at umbilicus with scant lochia. Patient is up ad yohannes and performing self-cares independently. Reporting incisional pain being treated with acetaminophen and ibuprofen as ordered. Patient's significant other is in the room with her and infant, attentive/supportive and participating in cares.    /78 (BP Location: Right arm, Patient Position: Semi-Levy's, Cuff Size: Adult Regular)   Pulse 80   Temp 98.3  F (36.8  C) (Oral)   Resp 16   Ht 1.499 m (4' 11\")   Wt 81.6 kg (179 lb 12.8 oz)   LMP  (LMP Unknown)   SpO2 100%   Breastfeeding Unknown   BMI 36.32 kg/m      NATY PHAN RN on 2025 at 9:57 PM      Problem: Adult Inpatient Plan of Care  Goal: Optimal Comfort and Wellbeing  Outcome: Progressing     Problem: Postpartum ( Delivery)  Goal: Successful Parent Role Transition  Outcome: Progressing         "

## 2025-02-26 NOTE — DISCHARGE SUMMARY
Discharge Summary    Manuela Gruber MRN# [id number]   Age: 29 year old YOB: 1995     Date of Admission:  2025  Date of Discharge::  2025  Admitting Physician:  Wiliam Krueger CNM  Discharge Physician:  Jessica Landaverde MD     Home clinic: [facility]          Admission Diagnoses:   Supervision of normal first pregnancy, antepartum [Z34.00]  At risk for complication of pregnancy [Z91.89]  Intrauterine pregnancy at 37w2d  Mild Polyhydramnios  A1GDM  Induction of labor            Discharge Diagnosis:   Same   Acute blood loss anemia, s/p Iron Infusion, asymptomatic  Failed Induction of labor  S/p  delivery          Procedures:     Procedure(s): Low transverse  delivery via Pfannenstiel incision  Iron Infusion              Medications Prior to Admission:     Medications Prior to Admission   Medication Sig Dispense Refill Last Dose/Taking    ARIPiprazole (ABILIFY) 5 MG tablet Take 1 tablet (5 mg) by mouth daily. 30 tablet 1 2025    ferrous sulfate 45 MG TBCR CR tablet Take 45 mg by mouth every other day. With vitamin C   Past Week    Prenat MV-Min w/Fe-Folate-DHA (PRENATAL COMPLETE PO) Take 1 tablet by mouth daily.   2025 Morning    vitamin C (ASCORBIC ACID) 250 MG tablet Take 250 mg by mouth every other day.   Past Week    [DISCONTINUED] acetone urine (KETOSTIX) test strip Use daily as directed 50 strip 1     [DISCONTINUED] aspirin 81 MG EC tablet Take 1 tablet (81 mg) by mouth daily. 90 tablet 1 2025    [DISCONTINUED] blood glucose (NO BRAND SPECIFIED) test strip Use to test blood sugar 4 times daily or as directed. To accompany: Blood Glucose Monitor Brands: per insurance. 100 strip 6     [DISCONTINUED] blood glucose monitoring (NO BRAND SPECIFIED) meter device kit Use to test blood sugar 4 times daily or as directed. Preferred blood glucose meter OR supplies to accompany: Blood Glucose Monitor Brands: per insurance. 1 kit 0     [DISCONTINUED] thin  (NO BRAND SPECIFIED) lancets Use with lanceting device 4 times per day. To accompany: Blood Glucose Monitor Brands: per insurance. 100 each 6              Discharge Medications:        Review of your medicines        START taking        Dose / Directions   acetaminophen 325 MG tablet  Commonly known as: TYLENOL      Dose: 975 mg  Take 3 tablets (975 mg) by mouth every 6 hours.  Quantity: 60 tablet  Refills: 1     ibuprofen 800 MG tablet  Commonly known as: ADVIL/MOTRIN      Dose: 800 mg  Take 1 tablet (800 mg) by mouth every 6 hours as needed for moderate pain.  Quantity: 60 tablet  Refills: 1     oxyCODONE 5 MG tablet  Commonly known as: ROXICODONE      Dose: 5 mg  Take 1 tablet (5 mg) by mouth every 4 hours as needed for moderate to severe pain.  Quantity: 12 tablet  Refills: 0     senna-docusate 8.6-50 MG tablet  Commonly known as: SENOKOT-S/PERICOLACE      Dose: 1 tablet  Take 1 tablet by mouth daily as needed for constipation.  Quantity: 20 tablet  Refills: 0            CONTINUE these medicines which have NOT CHANGED        Dose / Directions   ARIPiprazole 5 MG tablet  Commonly known as: ABILIFY  Used for: Mood disorder      Dose: 5 mg  Take 1 tablet (5 mg) by mouth daily.  Quantity: 30 tablet  Refills: 1     ferrous sulfate 45 MG Tbcr CR tablet      Dose: 45 mg  Take 45 mg by mouth every other day. With vitamin C  Refills: 0     PRENATAL COMPLETE PO      Dose: 1 tablet  Take 1 tablet by mouth daily.  Refills: 0     vitamin C 250 MG tablet  Commonly known as: ASCORBIC ACID      Dose: 250 mg  Take 250 mg by mouth every other day.  Refills: 0            STOP taking      acetone urine test strip  Commonly known as: KETOSTIX        aspirin 81 MG EC tablet        blood glucose monitoring meter device kit  Commonly known as: NO BRAND SPECIFIED        blood glucose test strip  Commonly known as: NO BRAND SPECIFIED        thin lancets  Commonly known as: NO BRAND SPECIFIED                  Where to get your medicines  "       These medications were sent to [facility]/pharmacy #[id number] - [place], [place] - [address] [facility] [address]  [address] [facility] [address], [facility] [address]      Phone: [phone]   acetaminophen 325 MG tablet  ibuprofen 800 MG tablet  senna-docusate 8.6-50 MG tablet       Some of these will need a paper prescription and others can be bought over the counter. Ask your nurse if you have questions.    Bring a paper prescription for each of these medications  oxyCODONE 5 MG tablet               Consultations:   No consultations were requested during this admission         Brief History of Labor:   Manuela Gruber is a 29 year old  who presented for labor induction for mild polyhydramnios and umbilical cord varix. She had no change in cervix past 5 cm. She was counseled about options and the decision was made to move forward with a   section.          Hospital Course:   The patient's hospital course was unremarkable.  She recovered as anticipated and experienced no post-operative complications. On discharge, her pain was well controlled. Vaginal bleeding is similar to peak menstrual flow.  Voiding without difficulty.  Ambulating well and tolerating a normal diet.  No fever or significant wound drainage. Infant is stable.  She was discharged on post-partum day #3. She will resume her prenatal with iron.    Post-partum hemoglobin:   Hemoglobin   Date Value Ref Range Status   2025 7.4 (L) 11.7 - 15.7 g/dL Final       Day of discharge assessment:   /86 (BP Location: Right arm)   Pulse 93   Temp 98.1  F (36.7  C) (Oral)   Resp 16   Ht 1.499 m (4' 11\")   Wt 79 kg (174 lb 1 oz)   LMP  (LMP Unknown)   SpO2 98%   Breastfeeding Unknown   BMI 35.16 kg/m    Abdomen: Soft, fundus firm, incision covered          Discharge Instructions and Follow-Up:     Discharge diet: Regular   Discharge activity: Your activity upon discharge: no lifting >15 lbs or strenuous exercise for 6 weeks, no driving for 2 weeks or " until you can slam on the breaks w/o pain, nothing in the vagina for 6 weeks (no tampons, intercourse, douching).    Discharge follow-up: Two weeks for incision check  6 weeks for postpartum visit.   Wound care: Remove bandage 7 days after surgery  Keep incision clean and dry.           Discharge Disposition:     Discharged to home       Jessica Landaverde MD

## 2025-02-26 NOTE — PLAN OF CARE
Manuela's vital and maternal assessment WDL. Incision is covered in a mepilex dressing that is clean, dry and intact. Pain adequately managed with scheduled Tylenol and Ibuprofen. Up independently, voiding without difficulty and caring for self and infant. Breastfeeding, a deep.comfortable and active latch observed, along with post breastfeed supplementation of formula, HDM and/or EBM. Manuela is also breast pumping post breastfeed to establish milk supply.  Manuela and her supportive s/o Junito are attentive to infant needs/cues, asking appropriate questions and hold infant frequently. Positive attachment behaviors observed.      Problem: Adult Inpatient Plan of Care  Goal: Plan of Care Review  Description: The Plan of Care Review/Shift note should be completed every shift.  The Outcome Evaluation is a brief statement about your assessment that the patient is improving, declining, or no change.  This information will be displayed automatically on your shift  note.  Outcome: Progressing  Flowsheets (Taken 2/26/2025 0308)  Plan of Care Reviewed With:   patient   significant other  Overall Patient Progress: improving     Problem: Pain Acute  Goal: Optimal Pain Control and Function  Outcome: Progressing  Intervention: Optimize Psychosocial Wellbeing  Recent Flowsheet Documentation  Taken 2/26/2025 0000 by Mague Dennis RN  Supportive Measures:   active listening utilized   positive reinforcement provided   self-care encouraged   verbalization of feelings encouraged  Diversional Activities:   smartphone   television  Intervention: Develop Pain Management Plan  Recent Flowsheet Documentation  Taken 2/26/2025 0100 by Mague Dennis, RN  Pain Management Interventions:   care clustered   quiet environment facilitated   rest  Taken 2/26/2025 0012 by Mague Dennis, RN  Pain Management Interventions: medication (see MAR)  Taken 2/26/2025 0000 by Mague Dennis RN  Pain Management Interventions:   care  clustered   quiet environment facilitated  Intervention: Prevent or Manage Pain  Recent Flowsheet Documentation  Taken 2/26/2025 0000 by Mague Dennis, RN  Sensory Stimulation Regulation:   care clustered   lighting decreased   quiet environment promoted  Sleep/Rest Enhancement: awakenings minimized  Bowel Elimination Promotion:   adequate fluid intake promoted   ambulation promoted   Goal Outcome Evaluation:      Plan of Care Reviewed With: patient, significant other    Overall Patient Progress: improvingOverall Patient Progress: improving

## 2025-02-26 NOTE — LACTATION NOTE
This note was copied from a baby's chart.  Follow up Lactation Visit    Current age : 56 hours old    Gestational age at delivery: 37w2d     Diaper count (last 24 hours) : 7 wet 7 soiled Green     Feedings (last 24 hours): 7 breast 10 Mother's expressed breastmilk, Human donor breastmilk, and Formula 24k/zelalem 20-30mL    Weight Loss: 5.5% at 48 hours    Breastfeeding goals: 6-12 months    Past breastfeeding experience:first child    Maternal health risk that may affect breastfeeding: GDM, Anemia    Home Pump: Spectra     Breast assessment: Breast: Round and Symmetrical, Nipple:Everted and Intact    Feeding assessment: Mother independent with latching infant in football hold on right breast. Continues to allow infant to latch to just the nipple, but Mother states that it is comfortable. Swallows 5:1 ratio.      Visit Summary: Infant switched from 24k/zelalem to human donor milk 20k/zelalem this morning and passed his blood sugar check after. With infant being sleepy for feeds and 37:2GA, CGA 37:5, encouraged offering supplement after every breastfeeding session. An close follow up with outpatient lactation by early next week to get a weighted feed.  Mother had Spectra breastpump, set up pump and explained how to work her breastpump.     Feeding plan:  Feed every 2-3 hours. Keep breastfeeding efficient. If infant does not latch within 5-10 minutes, or infant sleepy at breast, or not transferring milk then end feeding at breast.   Positioning reminders:  line up baby's nose to nipple   ear, shoulder, hip, nice straight line   chin off bay's chest; chin touching your breast prior to latch  your thumb lined up like baby's mustache, fingers under breast like a baby's beard  cheeks touching breast  Signs of milk transfer: hearing swallows, comfortable latch, meeting output goals and softening of breasts.   Supplement baby after every breastfeeding with colostrum/breastmilk ( If colostrum/breastmilk is not available, then donor milk  or formula may be used) Use below as a guideline; give more as baby cues.    0-48 hours 5-15 ml each feeding  48-72 hours 15-30 ml each feeding  72-96 hours 30-60 ml each feeding  96+ hours        and older follow healthcare providers recommendation    Pump for 15-20 minutes     Education:   []  Feeding Patterns in the First Few Days/second Night     [] Maternal Risk Factors that Could Affect Milk Supply      [] Hand Expression   [x] Stages of Milk Production           [] Feeding Cues           [x] LPI Feeding Behaviors  [] LBW Feeding Behaviors    [] Rousing Techniques          [] Benefits of Skin to Skin           [] Breastfeeding Positions          [x] Tips to Maintain a Deep Latch          [x] Nutritive vs Non-Nutritive Sucking           [x] Gentle Breast Compressions  [] Farrell Weight Loss   [] How to Know When Baby is Getting Enough to Eat  [x] Supplementation Methods        [x] Type of Supplement   [] Nipple Shield  [] Sore Nipples        [] Pacifier Use  [] Tight Frenulum           [] Breastfeeding Twins  [x] Pumping Plan  [x] Breastpump Education   [x] Engorgement/Reverse Pressure Softening        [x] Inpatient Lactation Support      [x] Outpatient Lactation Resources/Follow up    Follow up: LC will continue to follow up during hospital stay. Encouraged follow up with outpatient lactation by early next week to do a weighted feed d/t discharging with a triple feeding plan.

## 2025-02-26 NOTE — PLAN OF CARE
Goal Outcome Evaluation:      Plan of Care Reviewed With: patient    Overall Patient Progress: improving    Outcome Evaluation: VSS. PT met expectations for discharge.    VSS. Fundus firm. Incisional dressing is CDI. Pt reports tolerable pain control with ibuprofen and tylenol use. Pt is breastfeeding and supplementing with donor milk. Lactation consultant saw pt this shift. Ambulating independently in the room.     D:  Patient desires discharge home.  Discharge orders received and entered by provider.  A:  Discharge instructions reviewed with the patient.  All questions and concerns addressed.  R:  Discharge criteria met.  4 Part ID bands double checked.  [place] discharged in car seat with parents.  The nursing assistant escorted patient to car at 1508.

## 2025-02-27 ENCOUNTER — PATIENT OUTREACH (OUTPATIENT)
Dept: CARE COORDINATION | Facility: CLINIC | Age: 30
End: 2025-02-27
Payer: COMMERCIAL

## 2025-02-27 NOTE — PROGRESS NOTES
"Clinic Care Coordination Contact  Transitions of Care Outreach  Chief Complaint   Patient presents with    Clinic Care Coordination - Post Hospital       Most Recent Admission Date: 2025   Most Recent Admission Diagnosis: Supervision of normal first pregnancy, antepartum - Z34.00  At risk for complication of pregnancy - Z91.89     Most Recent Discharge Date: 2025   Most Recent Discharge Diagnosis: Supervision of normal first pregnancy, antepartum - Z34.00  At risk for complication of pregnancy - Z91.89  Single liveborn infant, delivered by  - Z38.01   delivery delivered - O82     Transitions of Care Assessment    Discharge Assessment  How are you doing now that you are home?: \"Good. I'm doing good.\"  How are your symptoms? (Red Flag symptoms escalate to triage hotline per guidelines): Improved  Do you know how to contact your clinic care team if you have future questions or changes to your health status? : Yes  Does the patient have their discharge instructions? : Yes  Does the patient have questions regarding their discharge instructions? : No  Were you started on any new medications or were there changes to any of your previous medications? : Yes  Does the patient have all of their medications?: No (see comment) (Pt plans to pick-up her prescriptions from the pharmacy today)  Do you have questions regarding any of your medications? : No  Do you have all of your needed medical supplies or equipment (DME)?  (i.e. oxygen tank, CPAP, cane, etc.): Yes    Post-op ([facility] Only)  If the patient had a surgery or procedure, do they have any questions for a nurse?: No         [facility] Explained and offered Care Coordination support to eligible patients: Yes    Patient accepted? No    Follow up Plan     Discharge Follow-Up  Discharge follow up appointment scheduled in alignment with recommended follow up timeframe or Transitions of Risk Category? (Low = within 30 days; Moderate= within 14 days; High= " within 7 days): No  Patient's follow up appointment not scheduled: Patient declined scheduling support. Education on the importance of transitions of care follow up. Provided scheduling phone number.    Future Appointments   Date Time Provider Department Center   2/28/2025  9:00 AM Odebunmi, Tolulope Oluwadamilola, MD [facility]       Outpatient Plan as outlined on AVS reviewed with patient.    For any urgent concerns, please contact our 24 hour nurse triage line: [phone] ([phone])       CARRINGTON Walsh  [phone]  Connected Care Resource [facility]

## 2025-04-04 PROBLEM — Z34.00 SUPERVISION OF NORMAL FIRST PREGNANCY, ANTEPARTUM: Status: RESOLVED | Noted: 2024-08-26 | Resolved: 2025-04-04

## 2025-04-04 PROBLEM — D50.8 IRON DEFICIENCY ANEMIA SECONDARY TO INADEQUATE DIETARY IRON INTAKE: Status: RESOLVED | Noted: 2025-01-16 | Resolved: 2025-04-04

## 2025-06-05 ENCOUNTER — MYC MEDICAL ADVICE (OUTPATIENT)
Dept: PSYCHIATRY | Facility: CLINIC | Age: 30
End: 2025-06-05
Payer: COMMERCIAL

## 2025-06-05 DIAGNOSIS — F39 MOOD DISORDER: ICD-10-CM

## 2025-06-05 RX ORDER — ARIPIPRAZOLE 5 MG/1
5 TABLET ORAL DAILY
Qty: 30 TABLET | Refills: 0 | Status: SHIPPED | OUTPATIENT
Start: 2025-06-05

## 2025-06-05 NOTE — TELEPHONE ENCOUNTER
Hello,    [place] - Please see this refill request.    Scheduling - please reach out to the patient to set up a new appt.      Thanks,  Isaac

## 2025-08-13 ENCOUNTER — VIRTUAL VISIT (OUTPATIENT)
Dept: PSYCHIATRY | Facility: CLINIC | Age: 30
End: 2025-08-13
Attending: STUDENT IN AN ORGANIZED HEALTH CARE EDUCATION/TRAINING PROGRAM
Payer: COMMERCIAL

## 2025-08-13 VITALS — BODY MASS INDEX: 32.72 KG/M2 | WEIGHT: 162 LBS

## 2025-08-13 DIAGNOSIS — F39 MOOD DISORDER: ICD-10-CM

## 2025-08-13 DIAGNOSIS — Z79.899 ENCOUNTER FOR LONG-TERM (CURRENT) USE OF MEDICATIONS: Primary | ICD-10-CM

## 2025-08-13 PROCEDURE — 1126F AMNT PAIN NOTED NONE PRSNT: CPT | Mod: 95 | Performed by: STUDENT IN AN ORGANIZED HEALTH CARE EDUCATION/TRAINING PROGRAM

## 2025-08-13 PROCEDURE — 90833 PSYTX W PT W E/M 30 MIN: CPT | Mod: 95 | Performed by: STUDENT IN AN ORGANIZED HEALTH CARE EDUCATION/TRAINING PROGRAM

## 2025-08-13 PROCEDURE — 98006 SYNCH AUDIO-VIDEO EST MOD 30: CPT | Performed by: STUDENT IN AN ORGANIZED HEALTH CARE EDUCATION/TRAINING PROGRAM

## 2025-08-13 PROCEDURE — G2211 COMPLEX E/M VISIT ADD ON: HCPCS | Mod: 95 | Performed by: STUDENT IN AN ORGANIZED HEALTH CARE EDUCATION/TRAINING PROGRAM

## 2025-08-13 RX ORDER — ARIPIPRAZOLE 5 MG/1
5 TABLET ORAL DAILY
Qty: 30 TABLET | Refills: 2 | Status: SHIPPED | OUTPATIENT
Start: 2025-08-13

## 2025-08-13 ASSESSMENT — PATIENT HEALTH QUESTIONNAIRE - PHQ9
SUM OF ALL RESPONSES TO PHQ QUESTIONS 1-9: 0
10. IF YOU CHECKED OFF ANY PROBLEMS, HOW DIFFICULT HAVE THESE PROBLEMS MADE IT FOR YOU TO DO YOUR WORK, TAKE CARE OF THINGS AT HOME, OR GET ALONG WITH OTHER PEOPLE: NOT DIFFICULT AT ALL
SUM OF ALL RESPONSES TO PHQ QUESTIONS 1-9: 0

## 2025-08-13 ASSESSMENT — PAIN SCALES - GENERAL: PAINLEVEL_OUTOF10: NO PAIN (0)

## (undated) DEVICE — SOL NACL 0.9% IRRIG 1000ML BOTTLE 2F7124

## (undated) DEVICE — GLOVE UNDER INDICATOR PI SZ 6.5 LF 41665

## (undated) DEVICE — GLOVE BIOGEL PI ULTRATOUCH G SZ 6.5 42165

## (undated) DEVICE — SU VICRYL+ 2-0 XLH 27IN VIO VCP581G

## (undated) DEVICE — SURGICEL POWDER ABSORBABLE HEMOSTAT 3GM 3013SP

## (undated) DEVICE — PREP CHLORAPREP 26ML TINTED HI-LITE ORANGE 930815

## (undated) DEVICE — DRSG MEPILEX BORDER ADHS 4INX12IN STRL 496605

## (undated) DEVICE — SUTURE VICRYL 2-0 36IN CTX+ UND VCP979H

## (undated) DEVICE — SOL WATER IRRIG 1000ML BOTTLE 2F7114

## (undated) DEVICE — CUSTOM PACK C-SECTION LHE

## (undated) DEVICE — ESU GROUND PAD ADULT REM W/15' CORD E7507DB

## (undated) DEVICE — SUCTION MANIFOLD NEPTUNE 2 SYS 1 PORT 702-025-000

## (undated) DEVICE — ADAPTER REDUCER 7/8 TO 1/4 RF10210

## (undated) DEVICE — PAD BLADDER CNTRL SM BL 4IN X 9.75IN  1100B

## (undated) DEVICE — GOWN IMPERVIOUS BREATHABLE SMART LG 89015

## (undated) DEVICE — PACK MINOR SINGLE BASIN SSK3001

## (undated) DEVICE — BAG BIOHAZARD RED SMALL 24X23" A4823PR

## (undated) DEVICE — UNDERPAD 30X30 BULK 949B10

## (undated) RX ORDER — FENTANYL CITRATE 50 UG/ML
INJECTION, SOLUTION INTRAMUSCULAR; INTRAVENOUS
Status: DISPENSED
Start: 2025-02-23

## (undated) RX ORDER — MORPHINE SULFATE 1 MG/ML
INJECTION, SOLUTION EPIDURAL; INTRATHECAL; INTRAVENOUS
Status: DISPENSED
Start: 2025-02-23